# Patient Record
Sex: MALE | Race: WHITE | Employment: OTHER | URBAN - METROPOLITAN AREA
[De-identification: names, ages, dates, MRNs, and addresses within clinical notes are randomized per-mention and may not be internally consistent; named-entity substitution may affect disease eponyms.]

---

## 2017-01-23 ENCOUNTER — OFFICE VISIT (OUTPATIENT)
Dept: PEDIATRICS | Facility: CLINIC | Age: 77
End: 2017-01-23
Payer: MEDICARE

## 2017-01-23 ENCOUNTER — ANTICOAGULATION THERAPY VISIT (OUTPATIENT)
Dept: PHARMACY | Facility: CLINIC | Age: 77
End: 2017-01-23
Payer: MEDICARE

## 2017-01-23 VITALS
WEIGHT: 223 LBS | HEIGHT: 74 IN | HEART RATE: 77 BPM | TEMPERATURE: 97.5 F | SYSTOLIC BLOOD PRESSURE: 122 MMHG | BODY MASS INDEX: 28.62 KG/M2 | OXYGEN SATURATION: 98 % | DIASTOLIC BLOOD PRESSURE: 72 MMHG

## 2017-01-23 DIAGNOSIS — M54.16 LUMBAR RADICULOPATHY: Primary | ICD-10-CM

## 2017-01-23 DIAGNOSIS — I48.91 ATRIAL FIBRILLATION (H): Primary | ICD-10-CM

## 2017-01-23 DIAGNOSIS — Z79.01 LONG-TERM (CURRENT) USE OF ANTICOAGULANTS: ICD-10-CM

## 2017-01-23 LAB — INR POINT OF CARE: 3.2 (ref 0.86–1.14)

## 2017-01-23 PROCEDURE — 99213 OFFICE O/P EST LOW 20 MIN: CPT | Performed by: NURSE PRACTITIONER

## 2017-01-23 PROCEDURE — 85610 PROTHROMBIN TIME: CPT | Mod: QW

## 2017-01-23 PROCEDURE — 36416 COLLJ CAPILLARY BLOOD SPEC: CPT

## 2017-01-23 PROCEDURE — 99207 ZZC NO CHARGE NURSE ONLY: CPT

## 2017-01-23 RX ORDER — METHYLPREDNISOLONE 4 MG
TABLET, DOSE PACK ORAL
Qty: 21 TABLET | Refills: 0 | Status: SHIPPED | OUTPATIENT
Start: 2017-01-23 | End: 2018-03-21

## 2017-01-23 NOTE — MR AVS SNAPSHOT
Carlton Matosdylon   1/23/2017 9:00 AM   Anticoagulation Therapy Visit    Description:  76 year old male   Provider:  INR MG   Department:  Mg Med Monitoring           INR as of 1/23/2017     Selected INR 3.2! (1/23/2017)      Anticoagulation Summary as of 1/23/2017     INR goal 2.0-3.0   Selected INR 3.2! (1/23/2017)   Full instructions 4 mg every day   Next INR check 3/6/2017    Indications   Atrial fibrillation (HCC) [I48.91] [I48.91]  Long-term (current) use of anticoagulants [Z79.01] [Z79.01]         Your next Anticoagulation Clinic appointment(s)     Jan 23, 2017  9:00 AM   Anticoagulation Visit with INR MG   [facility] ([facility])    [id number] [place] [id number] [phone]            Mar 06, 2017  9:00 AM   Anticoagulation Visit with INR MG   [facility])    [id number] [place] [id number] [phone]              Contact Numbers     [facility]  Please call the anticoagulation nurse at [phone] to cancel and/or reschedule your appointment, or with any problems or questions regarding your therapy.  You may call the main clinic number at [phone] if the nurse is not available.           January 2017 Details    Sun Mon Tue Wed Thu Fri Sat     1               2               3               4               5               6               7                 8               9               10               11               12               13               14                 15               16               17               18               19               20               21                 22               23      4 mg   See details      24      4 mg         25      4 mg         26      4 mg         27      4 mg         28      4 mg           29      4 mg         30      4 mg         31      4 mg              Date Details   01/23 This INR check               How to take  your warfarin dose     To take:  4 mg Take 2 of the 2 mg tablets.           February 2017 Details    Sun Mon Tue Wed Thu Fri Sat        1      4 mg         2      4 mg         3      4 mg         4      4 mg           5      4 mg         6      4 mg         7      4 mg         8      4 mg         9      4 mg         10      4 mg         11      4 mg           12      4 mg         13      4 mg         14      4 mg         15      4 mg         16      4 mg         17      4 mg         18      4 mg           19      4 mg         20      4 mg         21      4 mg         22      4 mg         23      4 mg         24      4 mg         25      4 mg           26      4 mg         27      4 mg         28      4 mg              Date Details   No additional details            How to take your warfarin dose     To take:  4 mg Take 2 of the 2 mg tablets.           March 2017 Details    Sun Mon Tue Wed Thu Fri Sat        1      4 mg         2      4 mg         3      4 mg         4      4 mg           5      4 mg         6            7               8               9               10               11                 12               13               14               15               16               17               18                 19               20               21               22               23               24               25                 26               27               28               29               30               31                 Date Details   No additional details    Date of next INR:  3/6/2017         How to take your warfarin dose     To take:  4 mg Take 2 of the 2 mg tablets.

## 2017-01-23 NOTE — PROGRESS NOTES
ANTICOAGULATION FOLLOW-UP CLINIC VISIT    Patient Name:  Carlton Bravo  Date:  1/23/2017  Contact Type:  Face to Face    SUBJECTIVE:     Patient Findings     Positives No Problem Findings           OBJECTIVE    INR PROTIME   Date Value Ref Range Status   01/23/2017 3.2* 0.86 - 1.14 Final       ASSESSMENT / PLAN  INR assessment SUPRA    Recheck INR In: 6 WEEKS    INR Location Clinic      Anticoagulation Summary as of 1/23/2017     INR goal 2.0-3.0   Selected INR 3.2! 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

## 2017-01-23 NOTE — NURSING NOTE
"Chief Complaint   Patient presents with     Musculoskeletal Problem     right lower leg pain ongoing for the last 3 weeks       Initial /91 mmHg  Pulse 77  Temp(Src) 97.5  F (36.4  C) (Temporal)  Ht 6' 2\" (1.88 m)  Wt 223 lb (101.152 kg)  BMI 28.62 kg/m2  SpO2 98% Estimated body mass index is 28.62 kg/(m^2) as calculated from the following:    Height as of this encounter: 6' 2\" (1.88 m).    Weight as of this encounter: 223 lb (101.152 kg)..  BP completed using cuff size: ZOFIA Copeland    "

## 2017-01-23 NOTE — PROGRESS NOTES
SUBJECTIVE:                                                    Carlton Bravo is a 76 year old male who presents to clinic today for the following health issues:    Carlton reports 3 weeks of pain in his R buttock, R knee aching, and numbness to the entire R lower extremity, below the knee.  These symptoms come and go, are worse when he is sitting and improve with movement/stretching.  He has had problems with sciatica in the past, this feels similar. He has noted some slight swelling and constant stiffness to the R knee. He is not taking anything for it, has not tried heat/ice.  No change in activity level prior to onset of pain and no injury. Pain is not preventing him from completing his normal activities.  Joint Pain     Onset: 3 weeks     Description:   Location: lower right leg  Character: Dull ache    Intensity: mild, moderate    Progression of Symptoms: same    Accompanying Signs & Symptoms:  Other symptoms: numbness   History:   Previous similar pain: YES      Precipitating factors:   Trauma or overuse: no     Alleviating factors:  Improved by: nothing       Therapies Tried and outcome: none        Problem list and histories reviewed & adjusted, as indicated.  Additional history: as documented    Patient Active Problem List   Diagnosis     Hypertension     Idiopathic cardiomyopathy (H)     Dyslipidemia     Hemorrhage of gastrointestinal tract     Atrial fibrillation (HCC) [I48.91]     Long-term (current) use of anticoagulants [Z79.01]     Acute posthemorrhagic anemia     Alcohol abuse     Gastric ulcer with hemorrhage     Gastrointestinal hemorrhage     History of colonic polyps     Benign prostatic hyperplasia with urinary obstruction     Past Surgical History   Procedure Laterality Date     Knee surgery  1990     bilateral arthroscopic       Social History   Substance Use Topics     Smoking status: Never Smoker      Smokeless tobacco: Never Used     Alcohol Use: 0.0 oz/week     0 Standard drinks or  "equivalent per week      Comment: was drinking about 5 drinks a night but quit 7/2015 due to GI bleed.      Family History   Problem Relation Age of Onset     Myocardial Infarction       CEREBROVASCULAR DISEASE Father      Hypertension No family hx of      DIABETES No family hx of      Colon Cancer No family hx of      Prostate Cancer No family hx of          Current Outpatient Prescriptions   Medication Sig Dispense Refill     pantoprazole (PROTONIX) 40 MG enteric coated tablet Take 1 tablet (40 mg) by mouth 2 times daily Take 40 mg by mouth 2 times daily 180 tablet 1     warfarin (COUMADIN) 2 MG tablet Take 2 tablets (4 mg) by mouth daily X 5 days a week  3 tab. X 2 days a week or as directed by INR nurse 200 tablet 3     pravastatin (PRAVACHOL) 80 MG tablet Take 1 tablet (80 mg) by mouth daily 90 tablet 2     carvedilol (COREG) 6.25 MG tablet Take 2 tablets (12.5 mg) by mouth 2 times daily 360 tablet 3     No Known Allergies    ROS:  CONSTITUTIONAL:NEGATIVE for fever, chills, change in weight  INTEGUMENTARY/SKIN: NEGATIVE for worrisome rashes, redness,  or lesions  MUSCULOSKELETAL: POSITIVE  for joint stiffness in R knee, joint swelling R knee, radicular pain to R lower buttock and ache down lateral R leg. NEGATIVE for redness, warmth to joints.  NEURO: NEGATIVE for weakness, dizziness POSITIVE for numbness or tingling to RLE below the knee.  PSYCHIATRIC: NEGATIVE for changes in mood or affect, activity level    OBJECTIVE:                                                    /72 mmHg  Pulse 77  Temp(Src) 97.5  F (36.4  C) (Temporal)  Ht 6' 2\" (1.88 m)  Wt 223 lb (101.152 kg)  BMI 28.62 kg/m2  SpO2 98%  Body mass index is 28.62 kg/(m^2).     GENERAL: Patient is well nourished, well developed, well groomed and in no acute distress  NECK:normal, supple and no adenopathy  CARDIAC:regular rates and rhythm, normal S1 S2, no S3 or S4 and no murmur, click or rub.   RESP: clear to auscultation  unlabored " respirations  SKIN: negatives: no evidence of bleeding or bruising, no lesions, redness, or rash noted  MS: extremities normal- no gross deformities noted, normal muscle tone. Slight limp with first few steps, then steady, normal gait.  Generalized slight swelling to R knee. No pain with active or passive ROM of the knee. Negative valgus/varus stress, anterior/posterior drawer, lachmans.   No back pain with straight leg raise.    NEURO: Normal strength and tone, sensory exam grossly normal.  PSYCH: Alert, oriented, thought content appropriate,mentation appears normal., affect and mood normal, good hygiene    Diagnostic Test Results:  none      ASSESSMENT/PLAN:                                                      Carlton was seen today for musculoskeletal problem.    Diagnoses and all orders for this visit:    Lumbar radiculopathy  -     methylPREDNISolone (MEDROL DOSEPAK) 4 MG tablet; Follow package instructions      PLAN:   Symptomatic therapy suggested: OTC acetaminophen and call prn if symptoms persist or worsen  apply heat to affected area, apply ice to affected area  See Patient Instructions  I have examined the patient and agree with written evaluation. Assessment and plan presented by this provider.   WANDA Awan, JAMISON student at [employer]    NI Pearson CNP  [facility]

## 2017-01-23 NOTE — PATIENT INSTRUCTIONS
It was a pleasure seeing you today at the [facility]. Thank you for allowing us to care for you today. We truly hope we provided you with the excellent service you deserve. Please let us know if there is anything else we can do for you so we can be sure you are leaving completley satisfied with your care experience.       General information about your clinic   Clinic Hours Lab Hours (Appointments are required)   Mon-Thurs: 7:30 AM - 7 PM Mon-Thurs: 7:30 AM - 7 PM   Fri: 7:30 AM - 5 PM Fri: 7:30 AM - 5 PM        After Hours Nurse Advise & Appts:  [facility]: [phone]  [facility] On Call: to make appointments anytime: [phone] On Call Physician: call [phone] and answering service will page the on call physician.        For urgent appointments, please call [phone] and ask for the triage nurse or your care team clinic nurse.  How to contact my care team:  [place]: [email or web address]   Phone: [phone]   Fax: [phone]       [facility]:   Phone: [phone]  Hours: 8:00 AM - 6:00 PM  Medication Refills:  Call your pharmacy and they will forward the refill to us. Please allow 3 business days for your refills to be completed.       Normal or non-critical lab and imaging results will be communicated to you by MyChart, letter or phone within 7 days.  If you do not hear from us within 10 days, please call the clinic. If you have a critical or abnormal lab result, we will notify you by phone as soon as possible.       We now have PWIC (Pediatric Walk in Care)  Monday-Friday from 7:30-4. Simply walk in and be seen for your urgent needs like cough, fever, rash, diarrhea or vomiting, pink eye, UTI. No appointments needed. Ask one of the team for more information      -Your Care Team:    Dr. Walter Amaya - Internal Medicine/Pediatrics   Dr. Maurice Duggan - Family Medicine  Dr. Sheri Knight - Pediatrics  Lona Pettit CNP - Family Practice Nurse  Practitioner  Dr. Jenelle Lopez - Pediatrics  Dr. Maninder Rojas - Internal Medicine        PLAN:   1.   Symptomatic therapy suggested: OTC acetaminophen and call prn if symptoms persist or worsen  apply heat to affected area, apply ice to affected area    2.  Orders Placed This Encounter   Medications     methylPREDNISolone (MEDROL DOSEPAK) 4 MG tablet     Sig: Follow package instructions     Dispense:  21 tablet     Refill:  0     3. Patient needs to follow up in if no improvement,or sooner if worsening of symptoms or other symptoms develop.

## 2017-01-23 NOTE — MR AVS SNAPSHOT
After Visit Summary   1/23/2017    Carlton Bravo    MRN: [id number]           Patient Information     Date Of Birth          1940        Visit Information        Provider Department      1/23/2017 9:20 AM Lona Pettit APRN CNP [facility]        Today's Diagnoses     Lumbar radiculopathy    -  1       Care Instructions    It was a pleasure seeing you today at the [facility]. Thank you for allowing us to care for you today. We truly hope we provided you with the excellent service you deserve. Please let us know if there is anything else we can do for you so we can be sure you are leaving completley satisfied with your care experience.       General information about your clinic   Clinic Hours Lab Hours (Appointments are required)   Mon-Thurs: 7:30 AM - 7 PM Mon-Thurs: 7:30 AM - 7 PM   Fri: 7:30 AM - 5 PM Fri: 7:30 AM - 5 PM        After Hours Nurse Advise & Appts:  [facility] Nurse [facility]: [phone]  [facility] On Call: to make appointments anytime: [phone] On Call Physician: call [phone] and answering service will page the on call physician.        For urgent appointments, please call [phone] and ask for the triage nurse or your care team clinic nurse.  How to contact my care team:  [facility]: [email or web address]   Phone: [phone]   Fax: [phone]       [facility]:   Phone: [phone]  Hours: 8:00 AM - 6:00 PM  Medication Refills:  Call your pharmacy and they will forward the refill to us. Please allow 3 business days for your refills to be completed.       Normal or non-critical lab and imaging results will be communicated to you by MyChart, letter or phone within 7 days.  If you do not hear from us within 10 days, please call the clinic. If you have a critical or abnormal lab result, we will notify you by phone as soon as possible.       We now have PWIC (Pediatric Walk in Care)  Monday-Friday from  7:30-4. Simply walk in and be seen for your urgent needs like cough, fever, rash, diarrhea or vomiting, pink eye, UTI. No appointments needed. Ask one of the team for more information      -Your Care Team:    Dr. Walter Amaya - Internal Medicine/Pediatrics   Dr. Maurice Duggan - Family Medicine  Dr. Sheri Knight - Pediatrics  Lona Pettit CNP - Family Practice Nurse Practitioner  Dr. Jenelle Lopez - Pediatrics  Dr. Maninder Rojas - Internal Medicine        PLAN:   1.   Symptomatic therapy suggested: OTC acetaminophen and call prn if symptoms persist or worsen  apply heat to affected area, apply ice to affected area    2.  Orders Placed This Encounter   Medications     methylPREDNISolone (MEDROL DOSEPAK) 4 MG tablet     Sig: Follow package instructions     Dispense:  21 tablet     Refill:  0     3. Patient needs to follow up in if no improvement,or sooner if worsening of symptoms or other symptoms develop.                  Follow-ups after your visit        Your next 10 appointments already scheduled     Mar 06, 2017  9:00 AM   Anticoagulation Visit with INR MG   [facility] ([facility])    [address] [place] [address] [phone]              Who to contact     If you have questions or need follow up information about today's clinic visit or your schedule please contact [facility] directly at [phone].  Normal or non-critical lab and imaging results will be communicated to you by MyChart, letter or phone within 4 business days after the clinic has received the results. If you do not hear from us within 7 days, please contact the clinic through MyChart or phone. If you have a critical or abnormal lab result, we will notify you by phone as soon as possible.  Submit refill requests through [employer] or call your pharmacy and they will forward the refill request to us. Please allow 3 business days for your refill to be completed.           "Additional Information About Your Visit        [employer] Information     [employer] is an electronic gateway that provides easy, online access to your medical records. With [employer], you can request a clinic appointment, read your test results, renew a prescription or communicate with your care team.     To sign up for [employer] visit the website at [email or web address]   You will be asked to enter the access code listed below, as well as some personal information. Please follow the directions to create your username and password.     Your access code is: [email or web address]  Expires: 2017 10:02 AM     Your access code will  in 90 days. If you need help or a new code, please contact your [facility] or call [phone] for assistance.        Care EveryWhere ID     This is your Care EveryWhere ID. This could be used by other organizations to access your [facility] medical records  [id number]        Your Vitals Were     Pulse Temperature Height BMI (Body Mass Index) Pulse Oximetry       77 97.5  F (36.4  C) (Temporal) 6' 2\" (1.88 m) 28.62 kg/m2 98%        Blood Pressure from Last 3 Encounters:   17 155/91   16 125/60   10/20/15 110/80    Weight from Last 3 Encounters:   17 223 lb (101.152 kg)   16 224 lb 14.4 oz (102.014 kg)   10/20/15 206 lb 12.8 oz (93.804 kg)              Today, you had the following     No orders found for display         Today's Medication Changes          These changes are accurate as of: 17 10:02 AM.  If you have any questions, ask your nurse or doctor.               Start taking these medicines.        Dose/Directions    methylPREDNISolone 4 MG tablet   Commonly known as:  MEDROL DOSEPAK   Used for:  Lumbar radiculopathy   Started by:  Lona Pettit APRN CNP        Follow package instructions   Quantity:  21 tablet   Refills:  0            Where to get your medicines      These medications were sent to [id number] " - [place], [place] - [address]  [address], [facility] [address]     Phone:  [phone]    - methylPREDNISolone 4 MG tablet             Primary Care Provider Office Phone # Fax #    Lona PettitNI [phone]       [facility] [address]  [place] [address]        Thank you!     Thank you for choosing [facility]  for your care. Our goal is always to provide you with excellent care. Hearing back from our patients is one way we can continue to improve our services. Please take a few minutes to complete the written survey that you may receive in the mail after your visit with us. Thank you!             Your Updated Medication List - Protect others around you: Learn how to safely use, store and throw away your medicines at www.disposemymeds.org.          This list is accurate as of: 1/23/17 10:02 AM.  Always use your most recent med list.                   Brand Name Dispense Instructions for use    carvedilol 6.25 MG tablet    COREG    360 tablet    Take 2 tablets (12.5 mg) by mouth 2 times daily       methylPREDNISolone 4 MG tablet    MEDROL DOSEPAK    21 tablet    Follow package instructions       pantoprazole 40 MG EC tablet    PROTONIX    180 tablet    Take 1 tablet (40 mg) by mouth 2 times daily Take 40 mg by mouth 2 times daily       pravastatin 80 MG tablet    PRAVACHOL    90 tablet    Take 1 tablet (80 mg) by mouth daily       warfarin 2 MG tablet    COUMADIN    200 tablet    Take 2 tablets (4 mg) by mouth daily X 5 days a week  3 tab. X 2 days a week or as directed by INR nurse

## 2017-02-03 DIAGNOSIS — E78.5 DYSLIPIDEMIA: ICD-10-CM

## 2017-02-03 DIAGNOSIS — I10 HTN, GOAL BELOW 140/90: ICD-10-CM

## 2017-02-03 DIAGNOSIS — K25.0: Primary | ICD-10-CM

## 2017-02-08 RX ORDER — PANTOPRAZOLE SODIUM 40 MG/1
TABLET, DELAYED RELEASE ORAL
Qty: 180 TABLET | Refills: 1 | Status: SHIPPED | OUTPATIENT
Start: 2017-02-08 | End: 2017-12-24

## 2017-02-08 RX ORDER — PRAVASTATIN SODIUM 80 MG/1
TABLET ORAL
Qty: 90 TABLET | Refills: 2 | Status: SHIPPED | OUTPATIENT
Start: 2017-02-08 | End: 2017-10-12

## 2017-02-08 NOTE — TELEPHONE ENCOUNTER
pravastatin (PRAVACHOL) 80 MG tablet  Last Written Prescription Date: 5/6/16  Last Fill Quantity: 90, # refills: 2    Last Office Visit with [facility], [facility] or [facility] prescribing provider:  1/23/17   Future Office Visit:  None for medication recheck    pantoprazole (PROTONIX) 40 MG enteric coated tablet      Last Written Prescription Date: 8/2/16  Last Fill Quantity: 180, # refills: 1    Last Office Visit with [facility], [facility] or [facility] prescribing provider:  1/23/17       CHOLESTEROL   Date Value Ref Range Status   10/20/2015 179 <200 mg/dL Final     Comment:     LDL Cholesterol is the primary guide to therapy.   The NCEP recommends further evaluation of: patients with cholesterol greater   than 200 mg/dL if additional risk factors are present, cholesterol greater   than   240 mg/dL, triglycerides greater than 150 mg/dL, or HDL less than 40 mg/dL.       HDL CHOLESTEROL   Date Value Ref Range Status   10/20/2015 56 >40 mg/dL Final     LDL CHOLESTEROL CALCULATED   Date Value Ref Range Status   10/20/2015 103 0 - 129 mg/dL Final     Comment:     LDL Cholesterol is the primary guide to therapy: LDL-cholesterol goal in high   risk patients is <100 mg/dL and in very high risk patients is <70 mg/dL.       TRIGLYCERIDES   Date Value Ref Range Status   10/20/2015 99 0 - 150 mg/dL Final     Comment:     Fasting specimen     CHOLESTEROL/HDL RATIO   Date Value Ref Range Status   10/20/2015 3.2 0.0 - 5.0 Final     ALT   Date Value Ref Range Status   07/22/2016 32 0 - 70 U/L Final

## 2017-02-10 NOTE — TELEPHONE ENCOUNTER
Attempt #1:  Left message for patient stating prescriptions has been sent to the pharmacy. Advised patient to call clinic to schedule fasting lab appointment. Clinic number given.  Ruchi Plata CMA

## 2017-02-16 NOTE — TELEPHONE ENCOUNTER
Notified patient that prescriptions have been sent to the pharmacy. Patient stated understanding and scheduled fasting lab appointment on 03/06/17 at 9:20 AM.  Ruchi Plata CMA

## 2017-02-22 DIAGNOSIS — I48.91 ATRIAL FIBRILLATION (H): ICD-10-CM

## 2017-02-23 RX ORDER — WARFARIN SODIUM 2 MG/1
TABLET ORAL
Qty: 200 TABLET | Refills: 3 | OUTPATIENT
Start: 2017-02-23

## 2017-02-23 NOTE — TELEPHONE ENCOUNTER
Called the pharmacy, patient just picked up 200 tabs 1 week ago.    Rx too soon to fill.    Nisha Blanco RN, [facility]

## 2017-03-03 DIAGNOSIS — I48.91 ATRIAL FIBRILLATION, UNSPECIFIED TYPE (H): Primary | ICD-10-CM

## 2017-03-03 DIAGNOSIS — I10 HTN, GOAL BELOW 140/90: ICD-10-CM

## 2017-03-03 DIAGNOSIS — I42.9 IDIOPATHIC CARDIOMYOPATHY (H): ICD-10-CM

## 2017-03-03 DIAGNOSIS — Z79.01 LONG-TERM (CURRENT) USE OF ANTICOAGULANTS: ICD-10-CM

## 2017-03-03 RX ORDER — CARVEDILOL 6.25 MG/1
12.5 TABLET ORAL 2 TIMES DAILY
Qty: 360 TABLET | Refills: 0 | Status: SHIPPED | OUTPATIENT
Start: 2017-03-03 | End: 2017-06-02

## 2017-03-03 NOTE — TELEPHONE ENCOUNTER
He should be followed periodically with his history   It looks like he saw Dr. Shah a few years ago and looks like he wanted to see him back in a year.   I have make him a referral as he can see a cardiologist here if that is more convenient

## 2017-03-03 NOTE — TELEPHONE ENCOUNTER
Pt called about refill. I passed along the information but pt would like to know why he has to see a cardiologist because he has been taking the medication for over 10 years.

## 2017-03-03 NOTE — TELEPHONE ENCOUNTER
Patient is requesting this medication.  He has not gotten this from a Primary Care Provider before, had been handled by Dr. Bentley.  He states he does not see cardiology anymore.      He would like a call back with outcome.    Routing to Dania Pettit NP to please advise on refill.    Carvedilol      Last Written Prescription Date: 2/26/16  Last Fill Quantity: 360, # refills: 3  Last Office Visit with [facility] or [facility] prescribing provider: 1/23/17       Potassium   Date Value Ref Range Status   07/22/2016 4.2 3.4 - 5.3 mmol/L Final     Creatinine   Date Value Ref Range Status   07/22/2016 0.96 0.66 - 1.25 mg/dL Final     BP Readings from Last 3 Encounters:   01/23/17 122/72   07/22/16 125/60   10/20/15 110/80     Nisha Blanco RN, [facility]

## 2017-03-03 NOTE — TELEPHONE ENCOUNTER
Pharmacy contacted clinic twice.  Patient calling now, wants med filled,  See meds and orders for RX and Pharmacy.   [id number] [address]  Patient:  [phone] (home)   carvedilol (COREG) 6.25 MG tablet

## 2017-03-03 NOTE — TELEPHONE ENCOUNTER
Will do one refill but needs to make a follow up appointment with cardiology or can make him referral do new cardiologist who he can see at the [facility]

## 2017-03-06 ENCOUNTER — ANTICOAGULATION THERAPY VISIT (OUTPATIENT)
Dept: PHARMACY | Facility: CLINIC | Age: 77
End: 2017-03-06
Payer: MEDICARE

## 2017-03-06 DIAGNOSIS — Z79.01 LONG-TERM (CURRENT) USE OF ANTICOAGULANTS: ICD-10-CM

## 2017-03-06 DIAGNOSIS — I10 HTN, GOAL BELOW 140/90: ICD-10-CM

## 2017-03-06 DIAGNOSIS — I48.91 ATRIAL FIBRILLATION, UNSPECIFIED TYPE (H): ICD-10-CM

## 2017-03-06 DIAGNOSIS — E78.5 DYSLIPIDEMIA: ICD-10-CM

## 2017-03-06 LAB
ALBUMIN SERPL-MCNC: 3.6 G/DL (ref 3.4–5)
ALP SERPL-CCNC: 72 U/L (ref 40–150)
ALT SERPL W P-5'-P-CCNC: 27 U/L (ref 0–70)
ANION GAP SERPL CALCULATED.3IONS-SCNC: 10 MMOL/L (ref 3–14)
AST SERPL W P-5'-P-CCNC: 22 U/L (ref 0–45)
BILIRUB SERPL-MCNC: 1 MG/DL (ref 0.2–1.3)
BUN SERPL-MCNC: 14 MG/DL (ref 7–30)
CALCIUM SERPL-MCNC: 8.8 MG/DL (ref 8.5–10.1)
CHLORIDE SERPL-SCNC: 107 MMOL/L (ref 94–109)
CHOLEST SERPL-MCNC: 198 MG/DL
CO2 SERPL-SCNC: 27 MMOL/L (ref 20–32)
CREAT SERPL-MCNC: 0.92 MG/DL (ref 0.66–1.25)
CREAT UR-MCNC: 232 MG/DL
GFR SERPL CREATININE-BSD FRML MDRD: 80 ML/MIN/1.7M2
GLUCOSE SERPL-MCNC: 100 MG/DL (ref 70–99)
HDLC SERPL-MCNC: 104 MG/DL
INR POINT OF CARE: 3.1 (ref 0.86–1.14)
LDLC SERPL CALC-MCNC: 79 MG/DL
MICROALBUMIN UR-MCNC: 67 MG/L
MICROALBUMIN/CREAT UR: 29.01 MG/G CR (ref 0–17)
NONHDLC SERPL-MCNC: 94 MG/DL
POTASSIUM SERPL-SCNC: 4 MMOL/L (ref 3.4–5.3)
PROT SERPL-MCNC: 7.1 G/DL (ref 6.8–8.8)
SODIUM SERPL-SCNC: 144 MMOL/L (ref 133–144)
TRIGL SERPL-MCNC: 75 MG/DL

## 2017-03-06 PROCEDURE — 85610 PROTHROMBIN TIME: CPT | Mod: QW

## 2017-03-06 PROCEDURE — 80061 LIPID PANEL: CPT | Performed by: NURSE PRACTITIONER

## 2017-03-06 PROCEDURE — 80053 COMPREHEN METABOLIC PANEL: CPT | Performed by: NURSE PRACTITIONER

## 2017-03-06 PROCEDURE — 36416 COLLJ CAPILLARY BLOOD SPEC: CPT

## 2017-03-06 PROCEDURE — 82043 UR ALBUMIN QUANTITATIVE: CPT | Performed by: NURSE PRACTITIONER

## 2017-03-06 PROCEDURE — 99207 ZZC NO CHARGE NURSE ONLY: CPT

## 2017-03-06 NOTE — MR AVS SNAPSHOT
Carlton Matosdylon   3/6/2017 9:00 AM   Anticoagulation Therapy Visit    Description:  76 year old male   Provider:  INR MG   Department:  Mg Med Monitoring           INR as of 3/6/2017     Today's INR 3.1!      Anticoagulation Summary as of 3/6/2017     INR goal 2.0-3.0   Today's INR 3.1!   Full instructions 4 mg every day   Next INR check 4/17/2017    Indications   Atrial fibrillation (HCC) [I48.91] [I48.91]  Long-term (current) use of anticoagulants [Z79.01] [Z79.01]         Your next Anticoagulation Clinic appointment(s)     Apr 17, 2017  9:00 AM CDT   Anticoagulation Visit with INR MG   [facility]    [address] [place] [address] [phone]              Contact Numbers     [facility]  Please call the anticoagulation nurse at [phone] to cancel and/or reschedule your appointment, or with any problems or questions regarding your therapy.  You may call the main clinic number at [phone] if the nurse is not available.           March 2017 Details    Sun Mon Tue Wed Thu Fri Sat        1               2               3               4                 5               6      4 mg   See details      7      4 mg         8      4 mg         9      4 mg         10      4 mg         11      4 mg           12      4 mg         13      4 mg         14      4 mg         15      4 mg         16      4 mg         17      4 mg         18      4 mg           19      4 mg         20      4 mg         21      4 mg         22      4 mg         23      4 mg         24      4 mg         25      4 mg           26      4 mg         27      4 mg         28      4 mg         29      4 mg         30      4 mg         31      4 mg           Date Details   03/06 This INR check               How to take your warfarin dose     To take:  4 mg Take 2 of the 2 mg tablets.           April 2017 Details    Sun Mon Tue Wed Thu Fri Sat           1      4 mg            2      4 mg         3      4 mg         4      4 mg         5      4 mg         6      4 mg         7      4 mg         8      4 mg           9      4 mg         10      4 mg         11      4 mg         12      4 mg         13      4 mg         14      4 mg         15      4 mg           16      4 mg         17            18               19               20               21               22                 23               24               25               26               27               28               29                 30                      Date Details   No additional details    Date of next INR:  4/17/2017         How to take your warfarin dose     To take:  4 mg Take 2 of the 2 mg tablets.

## 2017-03-06 NOTE — PROGRESS NOTES
ANTICOAGULATION FOLLOW-UP CLINIC VISIT    Patient Name:  Carlton Bravo  Date:  3/6/2017  Contact Type:  Face to Face    SUBJECTIVE:        OBJECTIVE    INR Protime   Date Value Ref Range Status   03/06/2017 3.1 (A) 0.86 - 1.14 Final       ASSESSMENT / PLAN  INR assessment THER    Recheck INR In: 6 WEEKS    INR Location Clinic      Anticoagulation Summary as of 3/6/2017     INR goal 2.0-3.0   Today's INR 3.1!   Maintenance plan 4 mg (2 mg x 2) every day   Full instructions 4 mg every day   Weekly total 28 mg   No change documented Nisha Blanco RN   Plan last modified Nisha Blanco RN (1/23/2017)   Next INR check 4/17/2017   Priority INR   Target end date     Indications   Atrial fibrillation (HCC) [I48.91] [I48.91]  Long-term (current) use of anticoagulants [Z79.01] [Z79.01]         Anticoagulation Episode Summary     INR check location     Preferred lab     Send INR reminders to MG  INR    Comments possible chromogenic 10/antiphosholipid syndrome            See the Encounter Report to view Anticoagulation Flowsheet and Dosing Calendar (Go to Encounters tab in chart review, and find the Anticoagulation Therapy Visit)        Nisha Blanco RN

## 2017-03-06 NOTE — TELEPHONE ENCOUNTER
"Called and informed patient of message. Patient noted \"it will be my choice\" did informed patient referral has been placed.    ZOFIA Ortiz    "

## 2017-04-17 ENCOUNTER — ANTICOAGULATION THERAPY VISIT (OUTPATIENT)
Dept: PHARMACY | Facility: CLINIC | Age: 77
End: 2017-04-17
Payer: MEDICARE

## 2017-04-17 DIAGNOSIS — I48.91 ATRIAL FIBRILLATION, UNSPECIFIED TYPE (H): ICD-10-CM

## 2017-04-17 DIAGNOSIS — Z79.01 LONG-TERM (CURRENT) USE OF ANTICOAGULANTS: ICD-10-CM

## 2017-04-17 LAB — INR POINT OF CARE: 2.6 (ref 0.86–1.14)

## 2017-04-17 PROCEDURE — 36416 COLLJ CAPILLARY BLOOD SPEC: CPT

## 2017-04-17 PROCEDURE — 99207 ZZC NO CHARGE NURSE ONLY: CPT

## 2017-04-17 PROCEDURE — 85610 PROTHROMBIN TIME: CPT | Mod: QW

## 2017-04-17 NOTE — PROGRESS NOTES
ANTICOAGULATION FOLLOW-UP CLINIC VISIT    Patient Name:  Carlton Bravo  Date:  4/17/2017  Contact Type:  Face to Face    SUBJECTIVE:     Patient Findings     Positives Change in medications           OBJECTIVE    INR Protime   Date Value Ref Range Status   04/17/2017 2.6 (A) 0.86 - 1.14 Final       ASSESSMENT / PLAN  INR assessment THER    Recheck INR In: 6 WEEKS    INR Location Clinic      Anticoagulation Summary as of 4/17/2017     INR goal 2.0-3.0   Today's INR 2.6   Maintenance plan 4 mg (2 mg x 2) every day   Full instructions 4 mg every day   Weekly total 28 mg   No change documented Annabelle Almaguer, RN   Plan last modified Nisha Blanco RN (1/23/2017)   Next INR check 5/30/2017   Priority INR   Target end date     Indications   Atrial fibrillation (HCC) [I48.91] [I48.91]  Long-term (current) use of anticoagulants [Z79.01] [Z79.01]         Anticoagulation Episode Summary     INR check location     Preferred lab     Send INR reminders to MG  INR    Comments possible chromogenic 10/antiphosholipid syndrome            See the Encounter Report to view Anticoagulation Flowsheet and Dosing Calendar (Go to Encounters tab in chart review, and find the Anticoagulation Therapy Visit)        Annabelle Almaguer, KACI

## 2017-04-17 NOTE — MR AVS SNAPSHOT
Carlton BAIRES Shelly   4/17/2017 9:00 AM   Anticoagulation Therapy Visit    Description:  76 year old male   Provider:  INR MG   Department:  Mg Med Monitoring           INR as of 4/17/2017     Today's INR 2.6      Anticoagulation Summary as of 4/17/2017     INR goal 2.0-3.0   Today's INR 2.6   Full instructions 4 mg every day   Next INR check 5/30/2017    Indications   Atrial fibrillation (HCC) [I48.91] [I48.91]  Long-term (current) use of anticoagulants [Z79.01] [Z79.01]         Your next Anticoagulation Clinic appointment(s)     May 30, 2017 12:40 PM CDT   Anticoagulation Visit with INR MG   [facility])    [address] [place] [address] [phone]              Contact Numbers     [facility]  Please call the anticoagulation nurse at [phone] to cancel and/or reschedule your appointment, or with any problems or questions regarding your therapy.  You may call the main clinic number at [phone] if the nurse is not available.           April 2017 Details    Sun Mon Tue Wed Thu Fri Sat           1                 2               3               4               5               6               7               8                 9               10               11               12               13               14               15                 16               17      4 mg   See details      18      4 mg         19      4 mg         20      4 mg         21      4 mg         22      4 mg           23      4 mg         24      4 mg         25      4 mg         26      4 mg         27      4 mg         28      4 mg         29      4 mg           30      4 mg                Date Details   04/17 This INR check               How to take your warfarin dose     To take:  4 mg Take 2 of the 2 mg tablets.           May 2017 Details    Sun Mon Tue Wed Thu Fri Sat      1      4 mg         2      4 mg         3      4 mg         4      4 mg         5       4 mg         6      4 mg           7      4 mg         8      4 mg         9      4 mg         10      4 mg         11      4 mg         12      4 mg         13      4 mg           14      4 mg         15      4 mg         16      4 mg         17      4 mg         18      4 mg         19      4 mg         20      4 mg           21      4 mg         22      4 mg         23      4 mg         24      4 mg         25      4 mg         26      4 mg         27      4 mg           28      4 mg         29      4 mg         30            31                   Date Details   No additional details    Date of next INR:  5/30/2017         How to take your warfarin dose     To take:  4 mg Take 2 of the 2 mg tablets.

## 2017-05-18 DIAGNOSIS — I10 HTN, GOAL BELOW 140/90: ICD-10-CM

## 2017-05-18 DIAGNOSIS — I42.9 IDIOPATHIC CARDIOMYOPATHY (H): ICD-10-CM

## 2017-05-18 NOTE — TELEPHONE ENCOUNTER
Last Visit:06/23/14  Next Visit: 07/19/17  Assessment and recommendations:     1) Hypertension - He has had problems with BP control in the past. He reports much better readings at home. I have asked him to check his BP at home 2-3x/week and call if it is > 135/85.     2) Idiopathic cardiomyopathy - on therapy his LV function returned to normal. He reports no symptoms of heart failure. A f/u echocardiogram has been ordered.     3) Atrial fibrillation - he was not aware he was back in atrial fibrillation. He reports no symptoms. He has not noted any change in his functional capacity. He does not have palpitations. We discussed that in the absence of symptoms there is not a strong case to be made for cardioversion.     His atrial size on the echocardiogram may provide an indication of whether the atrial fibrillation has been long-standing.     I appreciate the chance to help with Mr. Bravo's care. Please let me know if you have any questions or concerns.

## 2017-05-19 RX ORDER — CARVEDILOL 6.25 MG/1
12.5 TABLET ORAL 2 TIMES DAILY
Qty: 360 TABLET | Refills: 0 | OUTPATIENT
Start: 2017-05-19

## 2017-05-21 DIAGNOSIS — I48.91 ATRIAL FIBRILLATION (H): ICD-10-CM

## 2017-05-22 RX ORDER — WARFARIN SODIUM 2 MG/1
TABLET ORAL
Qty: 180 TABLET | Refills: 0 | Status: SHIPPED | OUTPATIENT
Start: 2017-05-22 | End: 2017-08-31

## 2017-05-22 NOTE — TELEPHONE ENCOUNTER
warfarin (COUMADIN) 2 MG tablet  Last Written Prescription Date: 5/18/2016  Last Fill Qty: 200, # refills: 3  Last Office Visit with [facility], [facility] or [facility] prescribing provider: 1/23/2017  Next 5 appointments (look out 90 days)     Jul 19, 2017  8:30 AM CDT   Return Visit with Dakota Bentley MD   [facility])    [address] [place] [address] [phone]                   Date and Result of Last PT/INR:   Lab Results   Component Value Date    INR 2.6 04/17/2017    INR 3.1 03/06/2017    INR 1.08 07/22/2016    INR 1.08 07/22/2016        Refilled per [facility] protocol.    Annabelle Almaguer RN

## 2017-05-30 ENCOUNTER — ANTICOAGULATION THERAPY VISIT (OUTPATIENT)
Dept: PHARMACY | Facility: CLINIC | Age: 77
End: 2017-05-30
Payer: MEDICARE

## 2017-05-30 DIAGNOSIS — I48.91 ATRIAL FIBRILLATION, UNSPECIFIED TYPE (H): ICD-10-CM

## 2017-05-30 DIAGNOSIS — Z79.01 LONG-TERM (CURRENT) USE OF ANTICOAGULANTS: ICD-10-CM

## 2017-05-30 LAB — INR POINT OF CARE: 2.6 (ref 0.86–1.14)

## 2017-05-30 PROCEDURE — 36416 COLLJ CAPILLARY BLOOD SPEC: CPT

## 2017-05-30 PROCEDURE — 85610 PROTHROMBIN TIME: CPT | Mod: QW

## 2017-05-30 PROCEDURE — 99207 ZZC NO CHARGE NURSE ONLY: CPT

## 2017-05-30 NOTE — PROGRESS NOTES
ANTICOAGULATION FOLLOW-UP CLINIC VISIT    Patient Name:  Carlton Bravo  Date:  5/30/2017  Contact Type:  Face to Face    SUBJECTIVE:     Patient Findings     Positives No Problem Findings           OBJECTIVE    INR Protime   Date Value Ref Range Status   05/30/2017 2.6 (A) 0.86 - 1.14 Final       ASSESSMENT / PLAN  No question data found.  Anticoagulation Summary as of 5/30/2017     INR goal 2.0-3.0   Today's INR 2.6   Maintenance plan 4 mg (2 mg x 2) every day   Full instructions 4 mg every day   Weekly total 28 mg   No change documented Annabelle Almaguer, KACI   Plan last modified Nisha Blanco RN (1/23/2017)   Next INR check    Priority INR   Target end date     Indications   Atrial fibrillation (HCC) [I48.91] [I48.91]  Long-term (current) use of anticoagulants [Z79.01] [Z79.01]         Anticoagulation Episode Summary     INR check location     Preferred lab     Send INR reminders to MG  INR    Comments possible chromogenic 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

## 2017-05-30 NOTE — MR AVS SNAPSHOT
Carlton BAIRES Shelly   5/30/2017 12:40 PM   Anticoagulation Therapy Visit    Description:  76 year old male   Provider:  INR MG   Department:  Mg Med Monitoring           INR as of 5/30/2017     Today's INR 2.6      Anticoagulation Summary as of 5/30/2017     INR goal 2.0-3.0   Today's INR 2.6   Full instructions 4 mg every day   Next INR check 7/11/2017    Indications   Atrial fibrillation (HCC) [I48.91] [I48.91]  Long-term (current) use of anticoagulants [Z79.01] [Z79.01]         Your next Anticoagulation Clinic appointment(s)     May 30, 2017 12:40 PM CDT   Anticoagulation Visit with INR MG   [facility])    [address] [place] [address] [phone]            Jul 11, 2017  9:00 AM CDT   Anticoagulation Visit with INR MG   [facility])    [address] [place] [address] [phone]              Contact Numbers     [facility]  Please call the anticoagulation nurse at [phone] to cancel and/or reschedule your appointment, or with any problems or questions regarding your therapy.  You may call the main clinic number at [phone] if the nurse is not available.           May 2017 Details    Sun Mon Tue Wed Thu Fri Sat      1               2               3               4               5               6                 7               8               9               10               11               12               13                 14               15               16               17               18               19               20                 21               22               23               24               25               26               27                 28               29               30      4 mg   See details      31      4 mg             Date Details   05/30 This INR check               How to take your warfarin dose     To take:  4 mg Take 2 of the  2 mg tablets.           June 2017 Details    Sun Mon Tue Wed Thu Fri Sat         1      4 mg         2      4 mg         3      4 mg           4      4 mg         5      4 mg         6      4 mg         7      4 mg         8      4 mg         9      4 mg         10      4 mg           11      4 mg         12      4 mg         13      4 mg         14      4 mg         15      4 mg         16      4 mg         17      4 mg           18      4 mg         19      4 mg         20      4 mg         21      4 mg         22      4 mg         23      4 mg         24      4 mg           25      4 mg         26      4 mg         27      4 mg         28      4 mg         29      4 mg         30      4 mg           Date Details   No additional details            How to take your warfarin dose     To take:  4 mg Take 2 of the 2 mg tablets.           July 2017 Details    Sun Mon Tue Wed Thu Fri Sat           1      4 mg           2      4 mg         3      4 mg         4      4 mg         5      4 mg         6      4 mg         7      4 mg         8      4 mg           9      4 mg         10      4 mg         11            12               13               14               15                 16               17               18               19               20               21               22                 23               24               25               26               27               28               29                 30               31                     Date Details   No additional details    Date of next INR:  7/11/2017         How to take your warfarin dose     To take:  4 mg Take 2 of the 2 mg tablets.

## 2017-06-02 DIAGNOSIS — I42.9 IDIOPATHIC CARDIOMYOPATHY (H): ICD-10-CM

## 2017-06-02 DIAGNOSIS — I10 HTN, GOAL BELOW 140/90: ICD-10-CM

## 2017-06-02 NOTE — TELEPHONE ENCOUNTER
Chart reviewed.   LOV 6/23/14 with Dr. Bentley.       carvedilol (COREG) 6.25 MG tablet      Last Written Prescription Date: 3/3/17  Last Fill Quantity: 360, # refills: 0    Last Office Visit with [facility], [facility] or [facility] prescribing provider:  PCP on 1/23/17   Future Office Visit:    Next 5 appointments (look out 90 days)     Aug 02, 2017  9:30 AM CDT   Return Visit with Dakota Bentley MD   [facility]    [address] [place] [id number]   [phone]                    BP Readings from Last 3 Encounters:   01/23/17 122/72   07/22/16 125/60   10/20/15 110/80       Routed to primary care provider for review and approval of medication request.    Nisha Lemus, MADHUN, RN, PHN  Rheumatology Care Coordinator  [facility]

## 2017-06-02 NOTE — TELEPHONE ENCOUNTER
Patient had an appointment with Dr. Bentley on 7/19/17.  This was rescheduled to 8/2/17 due to the provider attending schedule.  Patient states he will need a refill of his medication prior to this appointment.  Dania Pettit filled last, but he stated that she prefers that he get this from Dr. Bentley.      Carol Westholter

## 2017-06-05 RX ORDER — CARVEDILOL 6.25 MG/1
12.5 TABLET ORAL 2 TIMES DAILY
Qty: 360 TABLET | Refills: 0 | Status: SHIPPED | OUTPATIENT
Start: 2017-06-05 | End: 2017-08-02

## 2017-06-05 NOTE — TELEPHONE ENCOUNTER
Routed to Dania Pettit to review and authorize.    Betty De Guzman RN,   [facility]

## 2017-06-06 NOTE — TELEPHONE ENCOUNTER
Attempt #1:    Left message with patient to give call back to clinic. Clinic number given.    Sabine Covington, HELIO

## 2017-06-07 NOTE — TELEPHONE ENCOUNTER
Patient called back and Babita Sandoval told him we refilled his carvedilol medication and that he needs to keep his appt with cardiology per Dania Pettit.    Lucrecia Oliver CMA

## 2017-07-11 ENCOUNTER — ANTICOAGULATION THERAPY VISIT (OUTPATIENT)
Dept: PHARMACY | Facility: CLINIC | Age: 77
End: 2017-07-11
Payer: MEDICARE

## 2017-07-11 ENCOUNTER — TELEPHONE (OUTPATIENT)
Dept: PHARMACY | Facility: CLINIC | Age: 77
End: 2017-07-11

## 2017-07-11 DIAGNOSIS — I48.91 ATRIAL FIBRILLATION, UNSPECIFIED TYPE (H): ICD-10-CM

## 2017-07-11 DIAGNOSIS — Z79.01 LONG-TERM (CURRENT) USE OF ANTICOAGULANTS: ICD-10-CM

## 2017-07-11 DIAGNOSIS — I48.91 ATRIAL FIBRILLATION, UNSPECIFIED TYPE (H): Primary | ICD-10-CM

## 2017-07-11 LAB — INR POINT OF CARE: 3.5 (ref 0.86–1.14)

## 2017-07-11 PROCEDURE — 99207 ZZC NO CHARGE NURSE ONLY: CPT

## 2017-07-11 PROCEDURE — 36416 COLLJ CAPILLARY BLOOD SPEC: CPT

## 2017-07-11 PROCEDURE — 85610 PROTHROMBIN TIME: CPT | Mod: QW

## 2017-07-11 NOTE — PROGRESS NOTES
ANTICOAGULATION FOLLOW-UP CLINIC VISIT    Patient Name:  Carlton Bravo  Date:  7/11/2017  Contact Type:  Face to Face    SUBJECTIVE:     Patient Findings     Positives Change in diet/appetite    Comments Patient reports a lack of dark leafy greens in his diet. Patient requests that he incorporate his normal vitamin K foods back into his diet and return in 1 week rather than making a dose adjustment.            OBJECTIVE    INR Protime   Date Value Ref Range Status   07/11/2017 3.5 (A) 0.86 - 1.14 Final       ASSESSMENT / PLAN  INR assessment SUPRA    Recheck INR In: 1 WEEK    INR Location Clinic      Anticoagulation Summary as of 7/11/2017     INR goal 2.0-3.0   Today's INR 3.5!   Maintenance plan 4 mg (2 mg x 2) every day   Full instructions 4 mg every day   Weekly total 28 mg   No change documented Annabelle Almaguer RN   Plan last modified Nisha Blanco RN (1/23/2017)   Next INR check 7/18/2017   Priority INR   Target end date     Indications   Atrial fibrillation (HCC) [I48.91] [I48.91]  Long-term (current) use of anticoagulants [Z79.01] [Z79.01]         Anticoagulation Episode Summary     INR check location     Preferred lab     Send INR reminders to MG  INR    Comments possible chromogenic 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

## 2017-07-11 NOTE — MR AVS SNAPSHOT
Carlton Matosdylon   7/11/2017 9:00 AM   Anticoagulation Therapy Visit    Description:  77 year old male   Provider:  INR MG   Department:  Mg Med Monitoring           INR as of 7/11/2017     Today's INR 3.5!      Anticoagulation Summary as of 7/11/2017     INR goal 2.0-3.0   Today's INR 3.5!   Full instructions 4 mg every day   Next INR check 7/18/2017    Indications   Atrial fibrillation (HCC) [I48.91] [I48.91]  Long-term (current) use of anticoagulants [Z79.01] [Z79.01]         Your next Anticoagulation Clinic appointment(s)     Jul 11, 2017  9:00 AM CDT   Anticoagulation Visit with INR MG   [facility])    [address] [place] [address] [phone]            Jul 18, 2017  9:20 AM CDT   Anticoagulation Visit with INR MG   [facility])    [address] [place] [address] [phone]              Contact Numbers     [facility]  Please call the anticoagulation nurse at [phone] to cancel and/or reschedule your appointment, or with any problems or questions regarding your therapy.  You may call the main clinic number at [phone] if the nurse is not available.           July 2017 Details    Sun Mon Tue Wed Thu Fri Sat           1                 2               3               4               5               6               7               8                 9               10               11      4 mg   See details      12      4 mg         13      4 mg         14      4 mg         15      4 mg           16      4 mg         17      4 mg         18            19               20               21               22                 23               24               25               26               27               28               29                 30               31                     Date Details   07/11 This INR check       Date of next INR:  7/18/2017         How to  take your warfarin dose     To take:  4 mg Take 2 of the 2 mg tablets.

## 2017-07-18 ENCOUNTER — ANTICOAGULATION THERAPY VISIT (OUTPATIENT)
Dept: PHARMACY | Facility: CLINIC | Age: 77
End: 2017-07-18
Payer: MEDICARE

## 2017-07-18 DIAGNOSIS — Z79.01 LONG-TERM (CURRENT) USE OF ANTICOAGULANTS: ICD-10-CM

## 2017-07-18 DIAGNOSIS — I48.91 ATRIAL FIBRILLATION, UNSPECIFIED TYPE (H): ICD-10-CM

## 2017-07-18 LAB — INR POINT OF CARE: 5.3 (ref 0.86–1.14)

## 2017-07-18 PROCEDURE — 36416 COLLJ CAPILLARY BLOOD SPEC: CPT

## 2017-07-18 PROCEDURE — 99207 ZZC NO CHARGE NURSE ONLY: CPT

## 2017-07-18 PROCEDURE — 85610 PROTHROMBIN TIME: CPT | Mod: QW

## 2017-07-18 NOTE — MR AVS SNAPSHOT
Carlton Matosydlon   7/18/2017 9:20 AM   Anticoagulation Therapy Visit    Description:  77 year old male   Provider:  INR MG   Department:  Mg Med Monitoring           INR as of 7/18/2017     Today's INR 5.3!      Anticoagulation Summary as of 7/18/2017     INR goal 2.0-3.0   Today's INR 5.3!   Full instructions 7/19: Hold; 7/22: 2 mg; Otherwise 4 mg every day   Next INR check 7/24/2017    Indications   Atrial fibrillation (HCC) [I48.91] [I48.91]  Long-term (current) use of anticoagulants [Z79.01] [Z79.01]         Your next Anticoagulation Clinic appointment(s)     Jul 24, 2017  9:00 AM CDT   Anticoagulation Visit with INR MG   [facility]    [address] [place] [address] [phone]              Contact Numbers     [facility]  Please call the anticoagulation nurse at [phone] to cancel and/or reschedule your appointment, or with any problems or questions regarding your therapy.  You may call the main clinic number at [phone] if the nurse is not available.           July 2017 Details    Sun Mon Tue Wed Thu Fri Sat           1                 2               3               4               5               6               7               8                 9               10               11               12               13               14               15                 16               17               18      4 mg   See details      19      Hold         20      4 mg         21      4 mg         22      2 mg           23      4 mg         24            25               26               27               28               29                 30               31                     Date Details   07/18 This INR check       Date of next INR:  7/24/2017         How to take your warfarin dose     To take:  2 mg Take 1 of the 2 mg tablets.    To take:  4 mg Take 2 of the 2 mg tablets.    Hold Do not take your warfarin dose. See the  Details table to the right for additional instructions.

## 2017-07-18 NOTE — PROGRESS NOTES
ANTICOAGULATION FOLLOW-UP CLINIC VISIT    Patient Name:  Carlton Bravo  Date:  7/18/2017  Contact Type:  Face to Face    SUBJECTIVE:     Patient Findings     Positives Other complaints    Comments Patient reports he increased his vitamin K intake since last INR. Writer assessed patients health history and noted CHF - writer observed and assessed patients BLE, and noted +1-2 pitting edema. Patient reports the swelling has got worse in the last couple months. Patient is scheduled to see his cardiologist on 8/2 - writer recommended he show the provider his BLE - patient verbalized understanding. I am highly suspicious his CHF is flaring up at this time as all other factors have been ruled out at this time. Writer recommended patient utilize compression stockings and elevate BLE daily and least for 30 min 2-3 times a day. Advised patient to seek medical attention if he were to fall and injure his head - patient verbalized understanding.            OBJECTIVE    INR Protime   Date Value Ref Range Status   07/18/2017 5.3 (A) 0.86 - 1.14 Final       ASSESSMENT / PLAN  INR assessment SUPRA    Recheck INR In: 5 DAYS    INR Location Clinic      Anticoagulation Summary as of 7/18/2017     INR goal 2.0-3.0   Today's INR 5.3!   Maintenance plan 4 mg (2 mg x 2) every day   Full instructions 7/19: Hold; 7/22: 2 mg; Otherwise 4 mg every day   Weekly total 28 mg   Plan last modified Nisha Blanco RN (1/23/2017)   Next INR check 7/24/2017   Priority INR   Target end date     Indications   Atrial fibrillation (HCC) [I48.91] [I48.91]  Long-term (current) use of anticoagulants [Z79.01] [Z79.01]         Anticoagulation Episode Summary     INR check location     Preferred lab     Send INR reminders to [facility] INR    Comments possible chromogenic 10/antiphosholipid syndrome            See the Encounter Report to view Anticoagulation Flowsheet and Dosing Calendar (Go to Encounters tab in chart review, and find the Anticoagulation  Therapy Visit)      Annabelle Almaguer RN

## 2017-07-24 ENCOUNTER — ANTICOAGULATION THERAPY VISIT (OUTPATIENT)
Dept: PHARMACY | Facility: CLINIC | Age: 77
End: 2017-07-24
Payer: MEDICARE

## 2017-07-24 DIAGNOSIS — I48.91 ATRIAL FIBRILLATION, UNSPECIFIED TYPE (H): ICD-10-CM

## 2017-07-24 DIAGNOSIS — Z79.01 LONG-TERM (CURRENT) USE OF ANTICOAGULANTS: ICD-10-CM

## 2017-07-24 LAB — INR POINT OF CARE: 3.1 (ref 0.86–1.14)

## 2017-07-24 PROCEDURE — 85610 PROTHROMBIN TIME: CPT | Mod: QW

## 2017-07-24 PROCEDURE — 36416 COLLJ CAPILLARY BLOOD SPEC: CPT

## 2017-08-02 ENCOUNTER — OFFICE VISIT (OUTPATIENT)
Dept: CARDIOLOGY | Facility: CLINIC | Age: 77
End: 2017-08-02
Payer: MEDICARE

## 2017-08-02 ENCOUNTER — ANTICOAGULATION THERAPY VISIT (OUTPATIENT)
Dept: PHARMACY | Facility: CLINIC | Age: 77
End: 2017-08-02
Payer: MEDICARE

## 2017-08-02 VITALS
SYSTOLIC BLOOD PRESSURE: 160 MMHG | OXYGEN SATURATION: 95 % | DIASTOLIC BLOOD PRESSURE: 90 MMHG | WEIGHT: 231.2 LBS | TEMPERATURE: 98.6 F | BODY MASS INDEX: 29.68 KG/M2

## 2017-08-02 DIAGNOSIS — Z79.01 LONG-TERM (CURRENT) USE OF ANTICOAGULANTS: ICD-10-CM

## 2017-08-02 DIAGNOSIS — I48.91 ATRIAL FIBRILLATION, UNSPECIFIED TYPE (H): Primary | ICD-10-CM

## 2017-08-02 DIAGNOSIS — I10 HTN, GOAL BELOW 140/90: ICD-10-CM

## 2017-08-02 DIAGNOSIS — I42.9 IDIOPATHIC CARDIOMYOPATHY (H): ICD-10-CM

## 2017-08-02 DIAGNOSIS — R60.9 EDEMA, UNSPECIFIED TYPE: ICD-10-CM

## 2017-08-02 DIAGNOSIS — I48.91 ATRIAL FIBRILLATION, UNSPECIFIED TYPE (H): ICD-10-CM

## 2017-08-02 LAB — INR POINT OF CARE: 1.9 (ref 0.86–1.14)

## 2017-08-02 PROCEDURE — 85610 PROTHROMBIN TIME: CPT | Mod: QW

## 2017-08-02 PROCEDURE — 99204 OFFICE O/P NEW MOD 45 MIN: CPT | Performed by: INTERNAL MEDICINE

## 2017-08-02 PROCEDURE — 99207 ZZC NO CHARGE NURSE ONLY: CPT

## 2017-08-02 PROCEDURE — 36416 COLLJ CAPILLARY BLOOD SPEC: CPT

## 2017-08-02 RX ORDER — CARVEDILOL 6.25 MG/1
12.5 TABLET ORAL 2 TIMES DAILY
Qty: 360 TABLET | Refills: 0 | Status: SHIPPED | OUTPATIENT
Start: 2017-08-02 | End: 2017-12-03

## 2017-08-02 ASSESSMENT — PAIN SCALES - GENERAL: PAINLEVEL: NO PAIN (0)

## 2017-08-02 NOTE — PROGRESS NOTES
ANTICOAGULATION FOLLOW-UP CLINIC VISIT    Patient Name:  Carlton Bravo  Date:  8/2/2017  Contact Type:  Face to Face    SUBJECTIVE:     Patient Findings     Positives No Problem Findings           OBJECTIVE    INR Protime   Date Value Ref Range Status   08/02/2017 1.9 (A) 0.86 - 1.14 Final       ASSESSMENT / PLAN  INR assessment THER    Recheck INR In: 2 WEEKS    INR Location Clinic      Anticoagulation Summary as of 8/2/2017     INR goal 2.0-3.0   Today's INR 1.9!   Maintenance plan 4 mg (2 mg x 2) every day   Full instructions 8/7: 2 mg; 8/14: 2 mg; Otherwise 4 mg every day   Weekly total 28 mg   Plan last modified Nisha Blanco RN (1/23/2017)   Next INR check 8/16/2017   Priority INR   Target end date     Indications   Atrial fibrillation (HCC) [I48.91] [I48.91]  Long-term (current) use of anticoagulants [Z79.01] [Z79.01]         Anticoagulation Episode Summary     INR check location     Preferred lab     Send INR reminders to MG  INR    Comments possible chromogenic 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

## 2017-08-02 NOTE — PROGRESS NOTES
CC- blood pressure is high    HPI- Mr. Bravo is a patient I have seen at the [facility] clinic. He presented in December of 2007 with CHF and was found to have an EF of 25%. Coronary angiography demonstrated normal coronary arteries. He also was in atrial fibrillation. His initial attempt at cardioversion failed but after control of his heart failure sinus rhythm was restored. A follow up echocardiogram in 2009 demonstrated recovery of systolic function with an EF of 50-55%, although the LV was still mildly dilated.    I have not seen Mr. Bravo since June 2, 2011.     He reports he has been well. He simply felt it was time to be seen again. He has no complaints today.    I have reviewed his ECG. It shows atrial fibrillation with a controlled ventricular rate.    [id number] Interval history: I have not see Mr. Bravo for over three years. He reports he is here because he needs his meds refilled.     He has not noted atrial fibrillation. No chest pain/discomfort. Fairly sedentary due to knee pain. Ankle swelling at times but not other symptoms of heart failure.     Current Outpatient Prescriptions   Medication Sig Dispense Refill     carvedilol (COREG) 6.25 MG tablet Take 2 tablets (12.5 mg) by mouth 2 times daily 360 tablet 0     warfarin (COUMADIN) 2 MG tablet TAKE 2 TABLETS BY MOUTH DAILY 5 DAYS OF THE WEEK AND 3 TABS DAILY THE OTHER 2 DAYS OF THE WEEK OR  tablet 0     pravastatin (PRAVACHOL) 80 MG tablet TAKE ONE TABLET BY MOUTH ONCE DAILY 90 tablet 2     pantoprazole (PROTONIX) 40 MG EC tablet TAKE 1 TABLET BY MOUTH TWICE DAILY 180 tablet 1     methylPREDNISolone (MEDROL DOSEPAK) 4 MG tablet Follow package instructions 21 tablet 0       No Known Allergies    Past Medical History:   Diagnosis Date     CHF (congestive heart failure) (H)     no symptoms after therapy and recovery of EF     Dyslipidemia     LDL goal <100     Hypertension      Idiopathic cardiomyopathy (H) 12/2007     normal coronaries, EF 25%; f/u echo 2009 EF 50-55%     Paroxysmal atrial fibrillation (H)      Past Surgical History:   Procedure Laterality Date     KNEE SURGERY  1990    bilateral arthroscopic     Social History   Substance Use Topics     Smoking status: Never Smoker     Smokeless tobacco: Never Used     Alcohol use 0.0 oz/week     0 Standard drinks or equivalent per week      Comment: was drinking about 5 drinks a night but quit 7/2015 due to GI bleed.      Family History   Problem Relation Age of Onset     Myocardial Infarction       CEREBROVASCULAR DISEASE Father      Hypertension No family hx of      DIABETES No family hx of      Colon Cancer No family hx of      Prostate Cancer No family hx of      ROS- the ten system review is negative except as noted in the HPI. [id number]    Physical examination:  BP (!) 177/114 (BP Location: Right arm, Patient Position: Chair, Cuff Size: Adult Large)  Temp 98.6  F (37  C) (Oral)  Wt 104.9 kg (231 lb 3.2 oz)  SpO2 95%  BMI 29.68 kg/m2  He reports his BP at home is typically in the 120s    GENERAL APPEARANCE: healthy, alert and no distress  HEENT: no icterus, normal conjunctiva, no cyanosis  NECK: no venous distention, no bruits  RESPIRATORY: lungs clear to auscultation - no rales, rhonchi or wheezes  CARDIOVASCULAR: irregular, normal S1 S2, no S3 or S4 and no murmur, click or rub, precordium quiet with normal PMI  ABDOMEN: soft, non tender with normal bowel sounds, no masses, no bruits   EXTREMITIES: mild ankle edema  VASC: warm and well perfused  NEURO: alert and oriented to person, place and time, normal gait, affect and speech    Laboratory:  Results for DUSTIN FERRELL (MRN [id number]) as of 8/2/2017 09:26   Ref. Range 7/18/2017 00:00 7/24/2017 00:00 8/2/2017 00:00   INR Latest Ref Range: 0.86 - 1.14  5.3 (A) 3.1 (A) 1.9 (A)       Interpretation Summary  Mild to Moderate left ventricular dilation is present. The Ejection Fraction   is estimated at 45-50%.  Global right ventricular function is normal.   Pulmonary artery systolic pressure is normal. The inferior vena cava  is   normal. Ascending aorta 4.1 cm. No pericardial effusion is present.  PatientHeight: 74 in  PatientWeight: 227 lbs  SystolicPressure: 158 mmHg  DiastolicPressure: 98 mmHg  BSA 2.3 m^2        Procedure  Echocardiogram with two-dimensional, color and spectral Doppler performed.  Contrast Definity.  Patient was given 2.0ml mixture of 1.5ml Definity and 8.5ml saline.  IV start location LAC .     Left Ventricle  Mild to Moderate left ventricular dilation is present.  The Ejection Fraction is estimated at 45-50%.  Mild diffuse hypokinesis is present.     Right Ventricle  The right ventricle is normal size.  Global right ventricular function is normal.     Atria  Moderate to severe biatrial enlargement is present.     Mitral Valve  The mitral valve is normal.  Mild mitral insufficiency is present.     Aortic Valve  Mild aortic valve sclerosis is present.     Tricuspid Valve  The tricuspid valve is normal.  Mild tricuspid insufficiency is present.  Pulmonary artery systolic pressure is normal.     Pulmonic Valve  The pulmonic valve is normal.  Trace pulmonic insufficiency is present.     Vessels  The inferior vena cava  is normal.  Ascending aorta 4.1 cm.     Pericardium  No pericardial effusion is present.     MMode 2D Measurements & Calculations  IVSd: 1.2 cm  LVIDd: 5.8 cm  LVIDs: 4.0 cm  LVPWd: 1.3 cm  FS: 31 %  LV mass(C)d: 302 grams  Ao root diam: 3.8 cm  LA dimension: 4.6 cm  LA/Ao: 1.2   LVOT diam: 2.4 cm  EDV(MOD-bp): 0.00 ml  ESV(MOD-bp): 0.00 ml  Doppler Measurements & Calculations  MV V2 VTI: 4.6 cm  MV dec time: 0.18 sec  MR ERO: 0.07 cm^2  MR volume: 12 ml  TR Max gene: 238 cm/sec  TR Max P mmHg     Interpreting Physician:  JULIAN Mendoza MD electronically signed on 07-     Assessment and recommendations:    1) Hypertension - He reports that at home his BP is under much better  control. He typically gets readings in the 120s. A repeat manual BP check after 10 minutes of rest was 160/90.     - home BP   - bring BP machine for calibration    2) Idiopathic cardiomyopathy - he reports no dyspnea on exertion or orthopnea, he feels limited by pain in his knees. He has noted more edema in his ankles at times. This typically resolves over night.    - echocardiogram ordered    3) Atrial fibrillation - he feels like he is in and out of atrial fibrillation, on exam he is in atrial fibrillation today. If any decline in systolic function will get Holter to assess heart rate control.    His atrial size on the echocardiogram may provide an indication of whether the atrial fibrillation has been long-standing.    I appreciate the chance to help with Mr. Bravo's care. Please let me know if you have any questions or concerns.    Dakota Bentley MD

## 2017-08-02 NOTE — PATIENT INSTRUCTIONS
The following is a summary of your office visit:    Medications started today:none    Medications stopped today:none    Medication dose change: none    Nurse contact information: Valarie Odom RN  Cardiology Care Coordinator  [phone] Phone  [phone] Fax    Appointments made today: Echocardiogram and 6 month follow up    Patient instructions: Continue to monitor your blood pressure and pulse at home.      If you have had any blood work, imaging or other testing completed we will be in touch within 1-2 weeks regarding the results. If you have any questions, concerns or need to schedule a follow up, please contact us at [phone]. If you are needing refills please contact your pharmacy. For urgent after hour care please call the [place] [facility] at [phone] or the [facility] at [phone] and ask to speak to the cardiologist on call.    It was a pleasure meeting with you today. Please let us know if there is anything else we can do for you so that we can be sure you are leaving completely satisfied with your care experience.     Your Cardiology Team at [facility]  RN Care Coordinator: Valarie  CMA: Silva

## 2017-08-02 NOTE — MR AVS SNAPSHOT
Carlton Matosdylon   8/2/2017 9:00 AM   Anticoagulation Therapy Visit    Description:  77 year old male   Provider:  INR MG   Department:  Mg Med Monitoring           INR as of 8/2/2017     Today's INR 1.9!      Anticoagulation Summary as of 8/2/2017     INR goal 2.0-3.0   Today's INR 1.9!   Full instructions 8/7: 2 mg; 8/14: 2 mg; Otherwise 4 mg every day   Next INR check 8/16/2017    Indications   Atrial fibrillation (HCC) [I48.91] [I48.91]  Long-term (current) use of anticoagulants [Z79.01] [Z79.01]         Your next Anticoagulation Clinic appointment(s)     Aug 02, 2017  9:00 AM CDT   [facility]ticoagulation Visit with INR [facility]    [address] [place] [id number] [phone]            Aug 16, 2017  9:20 AM CDT   Anticoagulation Visit with INR [facility]   [facility]    [address] [place] [address] [phone]              Contact Numbers     [facility]  Please call the anticoagulation nurse at [phone] to cancel and/or reschedule your appointment, or with any problems or questions regarding your therapy.  You may call the main clinic number at [phone] if the nurse is not available.           August 2017 Details    Sun Mon Tue Wed Thu Fri Sat       1               2      4 mg   See details      3      4 mg         4      4 mg         5      4 mg           6      4 mg         7      2 mg         8      4 mg         9      4 mg         10      4 mg         11      4 mg         12      4 mg           13      4 mg         14      2 mg         15      4 mg         16            17               18               19                 20               21               22               23               24               25               26                 27               28               29               30               31                  Date Details   08/02 This INR check        Date of next INR:  8/16/2017         How to take your warfarin dose     To take:  2 mg Take 1 of the 2 mg tablets.    To take:  4 mg Take 2 of the 2 mg tablets.

## 2017-08-02 NOTE — MR AVS SNAPSHOT
After Visit Summary   8/2/2017    Carlton Bravo    MRN: [id number]           Patient Information     Date Of Birth          1940        Visit Information        Provider Department      8/2/2017 9:30 AM Dakota Bentley MD [facility]        Today's Diagnoses     Atrial fibrillation, unspecified type (H)    -  1    Idiopathic cardiomyopathy (H)        HTN, goal below 140/90        Edema, unspecified type          Care Instructions      The following is a summary of your office visit:    Medications started today:none    Medications stopped today:none    Medication dose change: none    Nurse contact information: Valarie Odom RN  Cardiology Care Coordinator  [phone] Phone  [phone] Fax    Appointments made today: Echocardiogram and 6 month follow up    Patient instructions: Continue to monitor your blood pressure and pulse at home.      If you have had any blood work, imaging or other testing completed we will be in touch within 1-2 weeks regarding the results. If you have any questions, concerns or need to schedule a follow up, please contact us at [phone]. If you are needing refills please contact your pharmacy. For urgent after hour care please call the [place] [facility] at [phone] or the [facility] at [phone] and ask to speak to the cardiologist on call.    It was a pleasure meeting with you today. Please let us know if there is anything else we can do for you so that we can be sure you are leaving completely satisfied with your care experience.     Your Cardiology Team at [facility]  RN Care Coordinator: Valarie Ascencio                    Follow-ups after your visit        Your next 10 appointments already scheduled     Aug 16, 2017  9:20 AM CDT   [facility]agulation [facility] with INR [facility]   [facility]    [address] [address] [place] [id number]    [phone]            Aug 16, 2017  9:30 AM CDT   Ech Complete with MGECHLUKAS, MG ECHO TECH   [facility]    [address] [place] [address] [phone]           1. Please bring or wear a comfortable two-piece outfit. 2. You may eat, drink and take your normal medicines. 3. For any questions that cannot be answered, please contact the ordering physician            Feb 21, 2018 10:30 AM CST   Return Visit with Dakota Bentley MD   [facility] ([facility])    [address] [place] [address] [phone]              Future tests that were ordered for you today     Open Future Orders        Priority Expected Expires Ordered    Echocardiogram Complete Routine  8/2/2018 8/2/2017            Who to contact     If you have questions or need follow up information about today's clinic visit or your schedule please contact [facility] directly at [phone].  Normal or non-critical lab and imaging results will be communicated to you by Apptimate[employer], letter or phone within 4 business days after the clinic has received the results. If you do not hear from us within 7 days, please contact the clinic through [employer] or phone. If you have a critical or abnormal lab result, we will notify you by phone as soon as possible.  Submit refill requests through [employer] or call your pharmacy and they will forward the refill request to us. Please allow 3 business days for your refill to be completed.          Additional Information About Your Visit        [employer] Information     [employer] is an electronic gateway that provides easy, online access to your medical records. With [employer], you can request a clinic appointment, read your test results, renew a prescription or communicate with your care team.     To sign up for [employer] visit the website at [email or web address]   You will be asked to enter  the access code listed below, as well as some personal information. Please follow the directions to create your username and password.     Your access code is: [email or web address]  Expires: 10/31/2017  9:51 AM     Your access code will  in 90 days. If you need help or a new code, please contact your [facility] or call [phone] for assistance.        Care EveryWhere ID     This is your Care EveryWhere ID. This could be used by other organizations to access your [facility] medical records  [id number]        Your Vitals Were     Temperature Pulse Oximetry BMI (Body Mass Index)             98.6  F (37  C) (Oral) 95% 29.68 kg/m2          Blood Pressure from Last 3 Encounters:   17 160/90   17 122/72   16 125/60    Weight from Last 3 Encounters:   17 104.9 kg (231 lb 3.2 oz)   17 101.2 kg (223 lb)   16 102 kg (224 lb 14.4 oz)                 Where to get your medicines      These medications were sent to [place] [address] [place] [facility] [place], [place] - [address] [address]  [address] [address]     Phone:  [phone]     carvedilol 6.25 MG tablet          Primary Care Provider Office Phone # Fax #    Lona Zelaya Wilver, APRN [phone]       [facility] [address]  [place] [address]        Equal Access to Services     BRANDAN LEWIS AH: Hadii aad ku hadasho Soomaali, waaxda luqadaha, qaybta kaalmada adeegyada, laila nichols . So [facility] [phone].    ATENCIÓN: Si habla español, tiene a chen disposición servicios gratuitos de asistencia lingüística. Llame al [phone].    We comply with applicable federal civil rights laws and [place] laws. We do not discriminate on the basis of race, color, national origin, age, disability sex, sexual orientation or gender identity.            Thank you!     Thank you for choosing [facility]  for your care. Our goal  is always to provide you with excellent care. Hearing back from our patients is one way we can continue to improve our services. Please take a few minutes to complete the written survey that you may receive in the mail after your visit with us. Thank you!             Your Updated Medication List - Protect others around you: Learn how to safely use, store and throw away your medicines at www.disposemymeds.org.          This list is accurate as of: 8/2/17  9:58 AM.  Always use your most recent med list.                   Brand Name Dispense Instructions for use Diagnosis    carvedilol 6.25 MG tablet    COREG    360 tablet    Take 2 tablets (12.5 mg) by mouth 2 times daily    Idiopathic cardiomyopathy (H), HTN, goal below 140/90       methylPREDNISolone 4 MG tablet    MEDROL DOSEPAK    21 tablet    Follow package instructions    Lumbar radiculopathy       pantoprazole 40 MG EC tablet    PROTONIX    180 tablet    TAKE 1 TABLET BY MOUTH TWICE DAILY    Gastric ulcer with hemorrhage, acute       pravastatin 80 MG tablet    PRAVACHOL    90 tablet    TAKE ONE TABLET BY MOUTH ONCE DAILY    Dyslipidemia       warfarin 2 MG tablet    COUMADIN    180 tablet    TAKE 2 TABLETS BY MOUTH DAILY 5 DAYS OF THE WEEK AND 3 TABS DAILY THE OTHER 2 DAYS OF THE WEEK OR AS    Atrial fibrillation (H)

## 2017-08-02 NOTE — NURSING NOTE
"Carlton Bravo's goals for this visit include:   Chief Complaint   Patient presents with     RECHECK     HTN and afib       He requests these members of his care team be copied on today's visit information: PCP    PCP: Lona Pettit    Referring Provider:  ESTABLISHED PATIENT  No address on file    Chief Complaint   Patient presents with     RECHECK     HTN and afib       Initial BP (!) 177/114 (BP Location: Right arm, Patient Position: Chair, Cuff Size: Adult Large)  Temp 98.6  F (37  C) (Oral)  Wt 104.9 kg (231 lb 3.2 oz)  SpO2 95%  BMI 29.68 kg/m2 Estimated body mass index is 29.68 kg/(m^2) as calculated from the following:    Height as of 1/23/17: 1.88 m (6' 2\").    Weight as of this encounter: 104.9 kg (231 lb 3.2 oz).  Medication Reconciliation: complete       Medication Refills: Yes      Silva Troncoso CMA        "

## 2017-08-16 ENCOUNTER — ANTICOAGULATION THERAPY VISIT (OUTPATIENT)
Dept: PHARMACY | Facility: CLINIC | Age: 77
End: 2017-08-16
Payer: MEDICARE

## 2017-08-16 ENCOUNTER — RADIANT APPOINTMENT (OUTPATIENT)
Dept: CARDIOLOGY | Facility: CLINIC | Age: 77
End: 2017-08-16
Attending: INTERNAL MEDICINE
Payer: MEDICARE

## 2017-08-16 DIAGNOSIS — I10 HTN, GOAL BELOW 140/90: ICD-10-CM

## 2017-08-16 DIAGNOSIS — R60.9 EDEMA, UNSPECIFIED TYPE: ICD-10-CM

## 2017-08-16 DIAGNOSIS — I48.91 ATRIAL FIBRILLATION, UNSPECIFIED TYPE (H): ICD-10-CM

## 2017-08-16 DIAGNOSIS — Z79.01 LONG-TERM (CURRENT) USE OF ANTICOAGULANTS: ICD-10-CM

## 2017-08-16 LAB — INR POINT OF CARE: 2.7 (ref 0.86–1.14)

## 2017-08-16 PROCEDURE — 99207 ZZC NO CHARGE NURSE ONLY: CPT

## 2017-08-16 PROCEDURE — 36416 COLLJ CAPILLARY BLOOD SPEC: CPT

## 2017-08-16 PROCEDURE — 93306 TTE W/DOPPLER COMPLETE: CPT

## 2017-08-16 PROCEDURE — 85610 PROTHROMBIN TIME: CPT | Mod: QW

## 2017-08-16 NOTE — MR AVS SNAPSHOT
Carlton BAIRES Shelly   8/16/2017 9:20 AM   Anticoagulation Therapy Visit    Description:  77 year old male   Provider:  INR MG   Department:  Mg Med Monitoring           INR as of 8/16/2017     Today's INR 2.7      Anticoagulation Summary as of 8/16/2017     INR goal 2.0-3.0   Today's INR 2.7   Full instructions 2 mg on Mon; 4 mg all other days   Next INR check 9/13/2017    Indications   Atrial fibrillation (HCC) [I48.91] [I48.91]  Long-term (current) use of anticoagulants [Z79.01] [Z79.01]         Your next Anticoagulation Clinic appointment(s)     Aug 16, 2017  9:20 AM CDT   Anticoagulation Visit with INR MG   [facility]    [address] [place] [address] [phone]            Sep 13, 2017  9:00 AM CDT   Anticoagulation Visit with INR MG   [facility])    [address] [place] [address] [phone]              Contact Numbers     [facility]  Please call the anticoagulation nurse at [phone] to cancel and/or reschedule your appointment, or with any problems or questions regarding your therapy.  You may call the main clinic number at [phone] if the nurse is not available.           August 2017 Details    Sun Mon Tue Wed Thu Fri Sat       1               2               3               4               5                 6               7               8               9               10               11               12                 13               14               15               16      4 mg   See details      17      4 mg         18      4 mg         19      4 mg           20      4 mg         21      2 mg         22      4 mg         23      4 mg         24      4 mg         25      4 mg         26      4 mg           27      4 mg         28      2 mg         29      4 mg         30      4 mg         31      4 mg            Date Details   08/16 This INR check                How to take your warfarin dose     To take:  2 mg Take 1 of the 2 mg tablets.    To take:  4 mg Take 2 of the 2 mg tablets.           September 2017 Details    Sun Mon Tue Wed Thu Fri Sat          1      4 mg         2      4 mg           3      4 mg         4      2 mg         5      4 mg         6      4 mg         7      4 mg         8      4 mg         9      4 mg           10      4 mg         11      2 mg         12      4 mg         13            14               15               16                 17               18               19               20               21               22               23                 24               25               26               27               28               29               30                Date Details   No additional details    Date of next INR:  9/13/2017         How to take your warfarin dose     To take:  2 mg Take 1 of the 2 mg tablets.    To take:  4 mg Take 2 of the 2 mg tablets.

## 2017-08-16 NOTE — PROGRESS NOTES
ANTICOAGULATION FOLLOW-UP CLINIC VISIT    Patient Name:  Carlton Bravo  Date:  8/16/2017  Contact Type:  Face to Face    SUBJECTIVE:     Patient Findings     Positives No Problem Findings           OBJECTIVE    INR Protime   Date Value Ref Range Status   08/16/2017 2.7 (A) 0.86 - 1.14 Final       ASSESSMENT / PLAN  INR assessment THER    Recheck INR In: 4 WEEKS    INR Location Clinic      Anticoagulation Summary as of 8/16/2017     INR goal 2.0-3.0   Today's INR 2.7   Maintenance plan 2 mg (2 mg x 1) on Mon; 4 mg (2 mg x 2) all other days   Full instructions 2 mg on Mon; 4 mg all other days   Weekly total 26 mg   Plan last modified Annabelle Almaguer RN (8/16/2017)   Next INR check 9/13/2017   Priority INR   Target end date     Indications   Atrial fibrillation (HCC) [I48.91] [I48.91]  Long-term (current) use of anticoagulants [Z79.01] [Z79.01]         Anticoagulation Episode Summary     INR check location     Preferred lab     Send INR reminders to [facility] INR    Comments possible chromogenic 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

## 2017-08-22 ENCOUNTER — TELEPHONE (OUTPATIENT)
Dept: NURSING | Facility: CLINIC | Age: 77
End: 2017-08-22

## 2017-08-22 NOTE — TELEPHONE ENCOUNTER
Called and spoke to patient. Informed that Dr Bentley had reviewed echo and his comments. He had no questions.

## 2017-08-31 DIAGNOSIS — I48.91 ATRIAL FIBRILLATION (H): ICD-10-CM

## 2017-09-01 RX ORDER — WARFARIN SODIUM 2 MG/1
TABLET ORAL
Qty: 180 TABLET | Refills: 0 | Status: SHIPPED | OUTPATIENT
Start: 2017-09-01 | End: 2017-12-03

## 2017-09-01 NOTE — TELEPHONE ENCOUNTER
Prescription approved per [facility] Refill Protocol.    Warfarin 2 mg tablets    Last Written Prescription Date: 5/22/17  Last Fill Qty: 180, # refills: 0  Last Office Visit with [facility], [facility] or [facility] prescribing provider: 1/23/17.r       Date and Result of Last PT/INR:   Lab Results   Component Value Date    INR 2.7 08/16/2017    INR 1.9 08/02/2017    INR 1.08 07/22/2016    INR 1.08 07/22/2016          Nisha Blanco RN, [facility]

## 2017-09-13 ENCOUNTER — ALLIED HEALTH/NURSE VISIT (OUTPATIENT)
Dept: PEDIATRICS | Facility: CLINIC | Age: 77
End: 2017-09-13
Payer: MEDICARE

## 2017-09-13 DIAGNOSIS — Z23 NEED FOR PROPHYLACTIC VACCINATION AND INOCULATION AGAINST INFLUENZA: Primary | ICD-10-CM

## 2017-09-13 PROCEDURE — 90662 IIV NO PRSV INCREASED AG IM: CPT

## 2017-09-13 PROCEDURE — 99207 ZZC NO CHARGE NURSE ONLY: CPT

## 2017-09-13 PROCEDURE — G0008 ADMIN INFLUENZA VIRUS VAC: HCPCS

## 2017-09-13 NOTE — NURSING NOTE
Carlton Bravo comes into clinic today for a flu vaccine.          This service provided today was under the supervising provider of the day Dr. Amaya, who was available if needed.    Reason for visit: Flu Shot    Lucrecia Oliver

## 2017-09-13 NOTE — PROGRESS NOTES
Injectable Influenza Immunization Documentation    1.  Are you sick today? (Fever of 100.5 or higher on the day of the clinic)   No    2.  Have you ever had Guillain-Jeffrey Syndrome within 6 weeks of an influenza vaccionation?  No    3. Do you have a life-threatening allergy to eggs?  No    4. Do you have a life-threatening allergy to a component of the vaccine? May include antibiotics, gelatin or latex.  No     5. Have you ever had a reaction to a dose of flu vaccine that needed immediate medical attention?  No     Form completed by Lucrecia Oliver

## 2017-09-13 NOTE — MR AVS SNAPSHOT
After Visit Summary   9/13/2017    Carlton Bravo    MRN: [id number]           Patient Information     Date Of Birth          1940        Visit Information        Provider Department      9/13/2017 9:40 AM MG ANCILLARY [facility]        Today's Diagnoses     Need for prophylactic vaccination and inoculation against influenza    -  1       Follow-ups after your visit        Your next 10 appointments already scheduled     Sep 13, 2017  9:40 AM CDT   Nurse Only with MG ANCILLARY   [facility] ([facility])    [address] [place] [id number] [phone]            Oct 25, 2017  9:00 AM CDT   Anticoagulation Visit with INR MG   [facility])    [address] [place] [id number] [phone]            Feb 21, 2018 10:30 AM CST   Return Visit with Dakota Bentley MD   [facility])    [id number] [place] [phone] [phone]              Who to contact     If you have questions or need follow up information about today's clinic visit or your schedule please contact [facility] directly at [phone].  Normal or non-critical lab and imaging results will be communicated to you by [employer]ethart, letter or phone within 4 business days after the clinic has received the results. If you do not hear from us within 7 days, please contact the clinic through [employer] or phone. If you have a critical or abnormal lab result, we will notify you by phone as soon as possible.  Submit refill requests through [employer] or call your pharmacy and they will forward the refill request to us. Please allow 3 business days for your refill to be completed.          Additional Information About Your Visit        MaintenanceNetharGeeklist Information     [employer] is an electronic gateway that provides easy, online access to your medical  records. With InVision, you can request a clinic appointment, read your test results, renew a prescription or communicate with your care team.     To sign up for [employer] visit the website at [email or web address]   You will be asked to enter the access code listed below, as well as some personal information. Please follow the directions to create your username and password.     Your access code is: [email or web address]  Expires: 10/31/2017  9:51 AM     Your access code will  in 90 days. If you need help or a new code, please contact your [facility] or call [phone] for assistance.        Care EveryWhere ID     This is your Care EveryWhere ID. This could be used by other organizations to access your [facility] medical records  [id number]         Blood Pressure from Last 3 Encounters:   17 160/90   17 122/72   16 125/60    Weight from Last 3 Encounters:   17 231 lb 3.2 oz (104.9 kg)   17 223 lb (101.2 kg)   16 224 lb 14.4 oz (102 kg)              We Performed the Following     ADMIN INFLUENZA (For MEDICARE Patients ONLY) []     FLU VACCINE, INCREASED ANTIGEN, PRESV FREE, AGE 65+ [80079]        Primary Care Provider Office Phone # Fax #    Lona Zelaya NI [facility] [phone]       [address]  [place] [address]        Equal Access to Services     BRANDAN LEWIS : Hadii aad ku hadasho Soomaali, waaxda luqadaha, qaybta kaalmada adeegyada, waxay shastain haydalila nichols . So [facility] [phone].    ATENCIÓN: Si habla español, tiene a chen disposición servicios gratuitos de asistencia lingüística. Llame al [phone].    We comply with applicable federal civil rights laws and [place] laws. We do not discriminate on the basis of race, color, national origin, age, disability sex, sexual orientation or gender identity.            Thank you!     Thank you for choosing [facility]  for  your care. Our goal is always to provide you with excellent care. Hearing back from our patients is one way we can continue to improve our services. Please take a few minutes to complete the written survey that you may receive in the mail after your visit with us. Thank you!             Your Updated Medication List - Protect others around you: Learn how to safely use, store and throw away your medicines at www.disposemymeds.org.          This list is accurate as of: 9/13/17  9:12 AM.  Always use your most recent med list.                   Brand Name Dispense Instructions for use Diagnosis    carvedilol 6.25 MG tablet    COREG    360 tablet    Take 2 tablets (12.5 mg) by mouth 2 times daily    Idiopathic cardiomyopathy (H), HTN, goal below 140/90       methylPREDNISolone 4 MG tablet    MEDROL DOSEPAK    21 tablet    Follow package instructions    Lumbar radiculopathy       pantoprazole 40 MG EC tablet    PROTONIX    180 tablet    TAKE 1 TABLET BY MOUTH TWICE DAILY    Gastric ulcer with hemorrhage, acute       pravastatin 80 MG tablet    PRAVACHOL    90 tablet    TAKE ONE TABLET BY MOUTH ONCE DAILY    Dyslipidemia       warfarin 2 MG tablet    COUMADIN    180 tablet    TAKE 2 TABS BY MOUTH DAILY 5 DAYS OF THE WEEK & 3 TABS DAILY THE OTHER 2 DAYS OF THE WEEK.    Atrial fibrillation (H)

## 2017-10-12 DIAGNOSIS — E78.5 DYSLIPIDEMIA: ICD-10-CM

## 2017-10-13 RX ORDER — PRAVASTATIN SODIUM 80 MG/1
TABLET ORAL
Qty: 90 TABLET | Refills: 0 | Status: SHIPPED | OUTPATIENT
Start: 2017-10-13 | End: 2018-01-04

## 2017-10-13 NOTE — TELEPHONE ENCOUNTER
pravastatin (PRAVACHOL) 80 MG tablet  Last Written Prescription Date: 2/8/2017  Last Fill Quantity: 90, # refills: 2  Last Office Visit with [facility], [facility] or [facility] prescribing provider: 1/23/2017       Lab Results   Component Value Date    CHOL 198 03/06/2017     Lab Results   Component Value Date     03/06/2017     Lab Results   Component Value Date    LDL 79 03/06/2017     Lab Results   Component Value Date    TRIG 75 03/06/2017     Lab Results   Component Value Date    CHOLHDLRATIO 3.2 10/20/2015     Refilled per [facility] protocol.    Annabelle Almaguer RN

## 2017-10-25 ENCOUNTER — ANTICOAGULATION THERAPY VISIT (OUTPATIENT)
Dept: PHARMACY | Facility: CLINIC | Age: 77
End: 2017-10-25
Payer: MEDICARE

## 2017-10-25 DIAGNOSIS — Z79.01 LONG-TERM (CURRENT) USE OF ANTICOAGULANTS: ICD-10-CM

## 2017-10-25 LAB — INR POINT OF CARE: 2.5 (ref 0.86–1.14)

## 2017-10-25 PROCEDURE — 99207 ZZC NO CHARGE NURSE ONLY: CPT

## 2017-10-25 PROCEDURE — 85610 PROTHROMBIN TIME: CPT | Mod: QW

## 2017-10-25 PROCEDURE — 36416 COLLJ CAPILLARY BLOOD SPEC: CPT

## 2017-10-25 NOTE — PROGRESS NOTES
ANTICOAGULATION FOLLOW-UP CLINIC VISIT    Patient Name:  Carlton Bravo  Date:  10/25/2017  Contact Type:  Face to Face    SUBJECTIVE:     Patient Findings     Positives No Problem Findings           OBJECTIVE    INR Protime   Date Value Ref Range Status   10/25/2017 2.5 (A) 0.86 - 1.14 Final       ASSESSMENT / PLAN  INR assessment THER    Recheck INR In: 6 WEEKS    INR Location Clinic      Anticoagulation Summary as of 10/25/2017     INR goal 2.0-3.0   Today's INR 2.5   Maintenance plan 2 mg (2 mg x 1) on Mon; 4 mg (2 mg x 2) all other days   Full instructions 2 mg on Mon; 4 mg all other days   Weekly total 26 mg   No change documented Annabelle Almaguer RN   Plan last modified Annabelle Almaguer RN (8/16/2017)   Next INR check 12/6/2017   Priority INR   Target end date     Indications   Atrial fibrillation (HCC) [I48.91] [I48.91]  Long-term (current) use of anticoagulants [Z79.01] [Z79.01]         Anticoagulation Episode Summary     INR check location     Preferred lab     Send INR reminders to MG  INR    Comments possible chromogenic 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

## 2017-10-25 NOTE — MR AVS SNAPSHOT
Carlton BAIRES Shelly   10/25/2017 9:00 AM   Anticoagulation Therapy Visit    Description:  77 year old male   Provider:  INR MG   Department:  Mg Med Monitoring           INR as of 10/25/2017     Today's INR 2.5      Anticoagulation Summary as of 10/25/2017     INR goal 2.0-3.0   Today's INR 2.5   Full instructions 2 mg on Mon; 4 mg all other days   Next INR check 12/6/2017    Indications   Atrial fibrillation (HCC) [I48.91] [I48.91]  Long-term (current) use of anticoagulants [Z79.01] [Z79.01]         Your next Anticoagulation Clinic appointment(s)     Oct 25, 2017  9:00 AM CDT   Anticoagulation Visit with INR MG   [facility]    [address] [place] [address] [phone]            Dec 06, 2017  9:00 AM CST   Anticoagulation Visit with INR MG   [facility])    [address] [place] [address] [phone]              Contact Numbers     [facility]  Please call the anticoagulation nurse at [phone] to cancel and/or reschedule your appointment, or with any problems or questions regarding your therapy.  You may call the main clinic number at [phone] if the nurse is not available.           October 2017 Details    Sun Mon Tue Wed Thu Fri Sat     1               2               3               4               5               6               7                 8               9               10               11               12               13               14                 15               16               17               18               19               20               21                 22               23               24               25      4 mg   See details      26      4 mg         27      4 mg         28      4 mg           29      4 mg         30      2 mg         31      4 mg              Date Details   10/25 This INR check               How to take your  warfarin dose     To take:  2 mg Take 1 of the 2 mg tablets.    To take:  4 mg Take 2 of the 2 mg tablets.           November 2017 Details    Sun Mon Tue Wed Thu Fri Sat        1      4 mg         2      4 mg         3      4 mg         4      4 mg           5      4 mg         6      2 mg         7      4 mg         8      4 mg         9      4 mg         10      4 mg         11      4 mg           12      4 mg         13      2 mg         14      4 mg         15      4 mg         16      4 mg         17      4 mg         18      4 mg           19      4 mg         20      2 mg         21      4 mg         22      4 mg         23      4 mg         24      4 mg         25      4 mg           26      4 mg         27      2 mg         28      4 mg         29      4 mg         30      4 mg            Date Details   No additional details            How to take your warfarin dose     To take:  2 mg Take 1 of the 2 mg tablets.    To take:  4 mg Take 2 of the 2 mg tablets.           December 2017 Details    Sun Mon Tue Wed Thu Fri Sat          1      4 mg         2      4 mg           3      4 mg         4      2 mg         5      4 mg         6            7               8               9                 10               11               12               13               14               15               16                 17               18               19               20               21               22               23                 24               25               26               27               28               29               30                 31                      Date Details   No additional details    Date of next INR:  12/6/2017         How to take your warfarin dose     To take:  2 mg Take 1 of the 2 mg tablets.    To take:  4 mg Take 2 of the 2 mg tablets.

## 2017-12-03 DIAGNOSIS — I10 HTN, GOAL BELOW 140/90: ICD-10-CM

## 2017-12-03 DIAGNOSIS — I48.91 ATRIAL FIBRILLATION (H): ICD-10-CM

## 2017-12-03 DIAGNOSIS — I42.9 IDIOPATHIC CARDIOMYOPATHY (H): ICD-10-CM

## 2017-12-04 RX ORDER — CARVEDILOL 6.25 MG/1
TABLET ORAL
Qty: 360 TABLET | Refills: 0 | Status: SHIPPED | OUTPATIENT
Start: 2017-12-04 | End: 2018-04-02

## 2017-12-04 RX ORDER — WARFARIN SODIUM 2 MG/1
TABLET ORAL
Qty: 180 TABLET | Refills: 0 | Status: SHIPPED | OUTPATIENT
Start: 2017-12-04 | End: 2018-02-25

## 2017-12-04 NOTE — TELEPHONE ENCOUNTER
warfarin (COUMADIN) 2 MG tablet  Last Written Prescription Date: 9/1/2017  Last Fill Qty: 180, # refills: 0  Last Office Visit with [facility], [facility] or [facility] prescribing provider: 1/23/2017  Next 5 appointments (look out 90 days)     Feb 21, 2018 10:30 AM CST   Return Visit with Dakota Bentley MD   [facility])    [address] [place] [address]   [phone]                   Date and Result of Last PT/INR:   Lab Results   Component Value Date    INR 2.5 10/25/2017    INR 2.4 09/13/2017    INR 1.08 07/22/2016    INR 1.08 07/22/2016        carvedilol (COREG) 6.25 MG tablet        Last Written Prescription Date: 8/2/2017  Last Fill Quantity: 360, # refills: 0    Last Office Visit with [facility], [facility] or [facility] prescribing provider:  1/23/2017   Future Office Visit:    Next 5 appointments (look out 90 days)     Feb 21, 2018 10:30 AM CST   Return Visit with Dakota Bentley MD   [facility])    [address] [place] [address]   [phone]                   Lab Results   Component Value Date    WBC 5.5 07/22/2016     Lab Results   Component Value Date    RBC 2.93 07/22/2016     Lab Results   Component Value Date    HGB 9.9 07/22/2016     Lab Results   Component Value Date    HCT 30.6 07/22/2016     No components found for: MCT  Lab Results   Component Value Date     07/22/2016     Lab Results   Component Value Date    MCH 33.8 07/22/2016     Lab Results   Component Value Date    MCHC 32.4 07/22/2016     Lab Results   Component Value Date    RDW 15.6 07/22/2016     Lab Results   Component Value Date     07/22/2016     Lab Results   Component Value Date    AST 22 03/06/2017     Lab Results   Component Value Date    ALT 27 03/06/2017     Creatinine   Date Value Ref Range Status   03/06/2017 0.92 0.66 - 1.25 mg/dL Final   Refilled per [facility] protocol.    Annabelle Almaguer RN

## 2017-12-13 ENCOUNTER — ANTICOAGULATION THERAPY VISIT (OUTPATIENT)
Dept: PHARMACY | Facility: CLINIC | Age: 77
End: 2017-12-13
Payer: MEDICARE

## 2017-12-13 DIAGNOSIS — Z79.01 LONG-TERM (CURRENT) USE OF ANTICOAGULANTS: ICD-10-CM

## 2017-12-13 DIAGNOSIS — I48.91 ATRIAL FIBRILLATION (H): ICD-10-CM

## 2017-12-13 LAB — INR POINT OF CARE: 2.7 (ref 0.86–1.14)

## 2017-12-13 PROCEDURE — 36416 COLLJ CAPILLARY BLOOD SPEC: CPT

## 2017-12-13 PROCEDURE — 99207 ZZC NO CHARGE NURSE ONLY: CPT

## 2017-12-13 PROCEDURE — 85610 PROTHROMBIN TIME: CPT | Mod: QW

## 2017-12-13 NOTE — MR AVS SNAPSHOT
Carlton Matosdylon   12/13/2017 9:00 AM   Anticoagulation Therapy Visit    Description:  77 year old male   Provider:  INR MG   Department:  Mg Med Monitoring           INR as of 12/13/2017     Today's INR 2.7      Anticoagulation Summary as of 12/13/2017     INR goal 2.0-3.0   Today's INR 2.7   Full instructions 2 mg on Mon; 4 mg all other days   Next INR check 1/24/2018    Indications   Atrial fibrillation (HCC) [I48.91] [I48.91]  Long-term (current) use of anticoagulants [Z79.01] [Z79.01]         Your next Anticoagulation Clinic appointment(s)     Jan 24, 2018  9:00 AM CST   [facility] INR [facility]    [address] [place] [address] [phone]              Contact Numbers     [facility]  Please call the anticoagulation nurse at [phone] to cancel and/or reschedule your appointment, or with any problems or questions regarding your therapy.  You may call the main clinic number at [phone] if the nurse is not available.           December 2017 Details    Sun Mon Tue Wed Thu Fri Sat          1               2                 3               4               5               6               7               8               9                 10               11               12               13      4 mg   See details      14      4 mg         15      4 mg         16      4 mg           17      4 mg         18      2 mg         19      4 mg         20      4 mg         21      4 mg         22      4 mg         23      4 mg           24      4 mg         25      2 mg         26      4 mg         27      4 mg         28      4 mg         29      4 mg         30      4 mg           31      4 mg                Date Details   12/13 This INR check               How to take your warfarin dose     To take:  2 mg Take 1 of the 2 mg tablets.    To take:  4 mg Take 2 of the 2 mg tablets.           January 2018 Details    [facility]  Mon Tue Wed Thu Fri Sat      1      2 mg         2      4 mg         3      4 mg         4      4 mg         5      4 mg         6      4 mg           7      4 mg         8      2 mg         9      4 mg         10      4 mg         11      4 mg         12      4 mg         13      4 mg           14      4 mg         15      2 mg         16      4 mg         17      4 mg         18      4 mg         19      4 mg         20      4 mg           21      4 mg         22      2 mg         23      4 mg         24            25               26               27                 28               29               30               31                   Date Details   No additional details    Date of next INR:  1/24/2018         How to take your warfarin dose     To take:  2 mg Take 1 of the 2 mg tablets.    To take:  4 mg Take 2 of the 2 mg tablets.

## 2017-12-13 NOTE — PROGRESS NOTES
ANTICOAGULATION FOLLOW-UP CLINIC VISIT    Patient Name:  Carlton Bravo  Date:  12/13/2017  Contact Type:  Face to Face    SUBJECTIVE:     Patient Findings     Positives No Problem Findings           OBJECTIVE    INR Protime   Date Value Ref Range Status   12/13/2017 2.7 (A) 0.86 - 1.14 Final       ASSESSMENT / PLAN  INR assessment THER    Recheck INR In: 6 WEEKS    INR Location Clinic      Anticoagulation Summary as of 12/13/2017     INR goal 2.0-3.0   Today's INR 2.7   Maintenance plan 2 mg (2 mg x 1) on Mon; 4 mg (2 mg x 2) all other days   Full instructions 2 mg on Mon; 4 mg all other days   Weekly total 26 mg   No change documented Annabelle Almaguer RN   Plan last modified Annabelle Almaguer RN (8/16/2017)   Next INR check 1/24/2018   Priority INR   Target end date     Indications   Atrial fibrillation (HCC) [I48.91] [I48.91]  Long-term (current) use of anticoagulants [Z79.01] [Z79.01]         Anticoagulation Episode Summary     INR check location     Preferred lab     Send INR reminders to MG  INR    Comments possible chromogenic 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

## 2017-12-24 DIAGNOSIS — K25.0 ACUTE GASTRIC ULCER WITH HEMORRHAGE: ICD-10-CM

## 2017-12-26 RX ORDER — PANTOPRAZOLE SODIUM 40 MG/1
TABLET, DELAYED RELEASE ORAL
Qty: 180 TABLET | Refills: 0 | Status: SHIPPED | OUTPATIENT
Start: 2017-12-26 | End: 2018-04-28

## 2017-12-26 NOTE — TELEPHONE ENCOUNTER
pantoprazole (PROTONIX) 40 MG EC tablet      Last Written Prescription Date: 2/8/2017  Last Fill Quantity: 180,  # refills: 1   Last Office Visit with [facility] or [facility] prescribing provider: 1/23/2017                                         Next 5 appointments (look out 90 days)     Mar 21, 2018 11:00 AM CDT   Return Visit with Dakota Bentley MD   [facility])    [address] [place] [phone]                  Refilled per [facility] protocol.    Annabelle Almaguer RN

## 2018-01-04 DIAGNOSIS — E78.5 DYSLIPIDEMIA: ICD-10-CM

## 2018-01-04 NOTE — LETTER
January 5, 2018      Carlton Bravo  [address]  [facility] [address]              Dear Carlton,      We recently received a call from your pharmacy requesting a refill of your medication. We have provided a 90 day refill of your medication, pravastatin (PRAVACHOL) 80 MG tablet, as requested.      A review of your chart indicates that your last office visit was on 1/3/2017.  An appointment is required yearly with your provider.    We have authorized one time 90 day refill of your medication to allow time for you to schedule.     If you have a history of diabetes or high cholesterol, please come in fasting for the appointment. Fasting entails nothing to eat or drink 8 hours prior to your appointment; with the exception on water. You may take your medication the day of the appointment.    Please call the clinic to schedule your appointment.    Thank you for taking an active role in your healthcare.      Sincerely,    [facility]

## 2018-01-05 RX ORDER — PRAVASTATIN SODIUM 80 MG/1
TABLET ORAL
Qty: 90 TABLET | Refills: 0 | Status: SHIPPED | OUTPATIENT
Start: 2018-01-05 | End: 2018-04-08

## 2018-01-05 NOTE — TELEPHONE ENCOUNTER
pravastatin (PRAVACHOL) 80 MG tablet   Last Written Prescription Date: 10/13/2017  Last Fill Quantity: 90, # refills: 0  Last Office Visit with G, P or [facility] prescribing provider: 1/23/2017  Next 5 appointments (look out 90 days)     Mar 21, 2018 11:00 AM CDT   Return Visit with Dakota Bentley MD   [facility] ([facility])    [address] [place] [address]   [phone]                   Lab Results   Component Value Date    CHOL 198 03/06/2017     Lab Results   Component Value Date     03/06/2017     Lab Results   Component Value Date    LDL 79 03/06/2017     Lab Results   Component Value Date    TRIG 75 03/06/2017     Lab Results   Component Value Date    CHOLHDLRATIO 3.2 10/20/2015     Refilled per [facility] protocol. Letter sent to patient indicating upcoming due annual OV.     Annabelle Almaguer RN

## 2018-01-30 ENCOUNTER — ANTICOAGULATION THERAPY VISIT (OUTPATIENT)
Dept: PHARMACY | Facility: CLINIC | Age: 78
End: 2018-01-30
Payer: MEDICARE

## 2018-01-30 DIAGNOSIS — Z79.01 LONG-TERM (CURRENT) USE OF ANTICOAGULANTS: ICD-10-CM

## 2018-01-30 DIAGNOSIS — I48.91 ATRIAL FIBRILLATION (H): ICD-10-CM

## 2018-01-30 LAB — INR POINT OF CARE: 2.1 (ref 0.86–1.14)

## 2018-01-30 PROCEDURE — 85610 PROTHROMBIN TIME: CPT | Mod: QW

## 2018-01-30 PROCEDURE — 99207 ZZC NO CHARGE NURSE ONLY: CPT

## 2018-01-30 PROCEDURE — 36416 COLLJ CAPILLARY BLOOD SPEC: CPT

## 2018-01-30 NOTE — MR AVS SNAPSHOT
Carlton Matosdylon   1/30/2018 9:00 AM   Anticoagulation Therapy Visit    Description:  77 year old male   Provider:  INR MG   Department:  Mg Med Monitoring           INR as of 1/30/2018     Today's INR 2.1      Anticoagulation Summary as of 1/30/2018     INR goal 2.0-3.0   Today's INR 2.1   Full instructions 2 mg on Mon; 4 mg all other days   Next INR check 3/21/2018    Indications   Atrial fibrillation (HCC) [I48.91] [I48.91]  Long-term (current) use of anticoagulants [Z79.01] [Z79.01]         Your next Anticoagulation Clinic appointment(s)     Jan 30, 2018  9:00 AM CST   Anticoagulation Visit with INR MG   [facility] ([facility])    [address] [place] [address] [phone]            Mar 21, 2018 10:40 AM CDT   Anticoagulation Visit with INR MG   [facility])    [address] [place] [address] [phone]              Contact Numbers     [facility]  Please call the anticoagulation nurse at [phone] to cancel and/or reschedule your appointment, or with any problems or questions regarding your therapy.  You may call the main clinic number at [phone] if the nurse is not available.           January 2018 Details    Sun Mon Tue Wed Thu Fri Sat      1               2               3               4               5               6                 7               8               9               10               11               12               13                 14               15               16               17               18               19               20                 21               22               23               24               25               26               27                 28               29               30      4 mg   See details      31      4 mg             Date Details   01/30 This INR check               How to take your warfarin dose     To take:   4 mg Take 2 of the 2 mg tablets.           February 2018 Details    Sun Mon Tue Wed Thu Fri Sat         1      4 mg         2      4 mg         3      4 mg           4      4 mg         5      2 mg         6      4 mg         7      4 mg         8      4 mg         9      4 mg         10      4 mg           11      4 mg         12      2 mg         13      4 mg         14      4 mg         15      4 mg         16      4 mg         17      4 mg           18      4 mg         19      2 mg         20      4 mg         21      4 mg         22      4 mg         23      4 mg         24      4 mg           25      4 mg         26      2 mg         27      4 mg         28      4 mg             Date Details   No additional details            How to take your warfarin dose     To take:  2 mg Take 1 of the 2 mg tablets.    To take:  4 mg Take 2 of the 2 mg tablets.           March 2018 Details    Sun Mon Tue Wed Thu Fri Sat         1      4 mg         2      4 mg         3      4 mg           4      4 mg         5      2 mg         6      4 mg         7      4 mg         8      4 mg         9      4 mg         10      4 mg           11      4 mg         12      2 mg         13      4 mg         14      4 mg         15      4 mg         16      4 mg         17      4 mg           18      4 mg         19      2 mg         20      4 mg         21            22               23               24                 25               26               27               28               29               30               31                Date Details   No additional details    Date of next INR:  3/21/2018         How to take your warfarin dose     To take:  2 mg Take 1 of the 2 mg tablets.    To take:  4 mg Take 2 of the 2 mg tablets.

## 2018-01-30 NOTE — PROGRESS NOTES
ANTICOAGULATION FOLLOW-UP CLINIC VISIT    Patient Name:  Carlton Bravo  Date:  1/30/2018  Contact Type:  Face to Face    SUBJECTIVE:     Patient Findings     Positives No Problem Findings           OBJECTIVE    INR Protime   Date Value Ref Range Status   01/30/2018 2.1 (A) 0.86 - 1.14 Final       ASSESSMENT / PLAN  INR assessment THER    Recheck INR In: 6 WEEKS    INR Location Clinic      Anticoagulation Summary as of 1/30/2018     INR goal 2.0-3.0   Today's INR 2.1   Maintenance plan 2 mg (2 mg x 1) on Mon; 4 mg (2 mg x 2) all other days   Full instructions 2 mg on Mon; 4 mg all other days   Weekly total 26 mg   No change documented Annabelle Almaguer RN   Plan last modified Annabelle Almaguer RN (8/16/2017)   Next INR check 3/21/2018   Priority INR   Target end date     Indications   Atrial fibrillation (HCC) [I48.91] [I48.91]  Long-term (current) use of anticoagulants [Z79.01] [Z79.01]         Anticoagulation Episode Summary     INR check location     Preferred lab     Send INR reminders to MG  INR    Comments possible chromogenic 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

## 2018-02-25 DIAGNOSIS — I48.91 ATRIAL FIBRILLATION (H): ICD-10-CM

## 2018-02-26 RX ORDER — WARFARIN SODIUM 2 MG/1
TABLET ORAL
Qty: 180 TABLET | Refills: 0 | Status: SHIPPED | OUTPATIENT
Start: 2018-02-26 | End: 2018-05-09

## 2018-02-26 NOTE — TELEPHONE ENCOUNTER
warfarin (COUMADIN) 2 MG tablet 180 tablet 0 12/4/2017  No   Sig: TAKE 2 TABS BY MOUTH DAILY 5 DAYS OF THE WEEK & 3 TABS DAILY THE OTHER 2 DAYS OF THE WEEK.     Last OV with Dr. Bentley: 8/2/2017    Refilled per [facility] protocol.    Annabelle Almaguer RN

## 2018-03-21 ENCOUNTER — ANTICOAGULATION THERAPY VISIT (OUTPATIENT)
Dept: PHARMACY | Facility: CLINIC | Age: 78
End: 2018-03-21
Payer: MEDICARE

## 2018-03-21 ENCOUNTER — OFFICE VISIT (OUTPATIENT)
Dept: CARDIOLOGY | Facility: CLINIC | Age: 78
End: 2018-03-21
Payer: MEDICARE

## 2018-03-21 VITALS
BODY MASS INDEX: 27.93 KG/M2 | HEART RATE: 87 BPM | DIASTOLIC BLOOD PRESSURE: 96 MMHG | OXYGEN SATURATION: 98 % | WEIGHT: 217.5 LBS | SYSTOLIC BLOOD PRESSURE: 150 MMHG

## 2018-03-21 DIAGNOSIS — I10 ESSENTIAL HYPERTENSION: ICD-10-CM

## 2018-03-21 DIAGNOSIS — Z79.01 LONG-TERM (CURRENT) USE OF ANTICOAGULANTS: ICD-10-CM

## 2018-03-21 DIAGNOSIS — I48.91 ATRIAL FIBRILLATION (H): ICD-10-CM

## 2018-03-21 DIAGNOSIS — I48.91 ATRIAL FIBRILLATION, UNSPECIFIED TYPE (H): Primary | ICD-10-CM

## 2018-03-21 LAB — INR POINT OF CARE: 3 (ref 0.86–1.14)

## 2018-03-21 PROCEDURE — 99214 OFFICE O/P EST MOD 30 MIN: CPT | Performed by: INTERNAL MEDICINE

## 2018-03-21 PROCEDURE — 85610 PROTHROMBIN TIME: CPT | Mod: QW

## 2018-03-21 PROCEDURE — 99207 ZZC NO CHARGE NURSE ONLY: CPT

## 2018-03-21 PROCEDURE — 36416 COLLJ CAPILLARY BLOOD SPEC: CPT

## 2018-03-21 ASSESSMENT — PAIN SCALES - GENERAL: PAINLEVEL: NO PAIN (0)

## 2018-03-21 NOTE — NURSING NOTE
"Carlton Bravo's goals for this visit include:   Chief Complaint   Patient presents with     RECHECK     6 month follow up       He requests these members of his care team be copied on today's visit information: PCP    PCP: Lona Pettit    Referring Provider:  No referring provider defined for this encounter.    Chief Complaint   Patient presents with     RECHECK     6 month follow up       Initial BP (!) 184/94 (BP Location: Left arm, Patient Position: Chair, Cuff Size: Adult Large)  Pulse 87  Wt 98.7 kg (217 lb 8 oz)  SpO2 98%  BMI 27.93 kg/m2 Estimated body mass index is 27.93 kg/(m^2) as calculated from the following:    Height as of 1/23/17: 1.88 m (6' 2\").    Weight as of this encounter: 98.7 kg (217 lb 8 oz).  Medication Reconciliation: complete         Medication Refills: none        Silva Troncoso CMA        "

## 2018-03-21 NOTE — MR AVS SNAPSHOT
After Visit Summary   3/21/2018    Carlton Bravo    MRN: [id number]           Patient Information     Date Of Birth          1940        Visit Information        Provider Department      3/21/2018 11:00 AM Dakota Bentley MD [facility]        Today's Diagnoses     Atrial fibrillation, unspecified type (H)    -  1    Essential hypertension           Follow-ups after your visit        Additional Services     Follow-Up with General Cardiologist - 1 Year                 Your next 10 appointments already scheduled     May 09, 2018  9:00 AM CDT   [facility] with INR [facility]   [facility]    [id number] [place] [address] [phone]              Future tests that were ordered for you today     Open Future Orders        Priority Expected Expires Ordered    Follow-Up with General Cardiologist - 1 Year Routine 3/21/2019 3/22/2019 3/21/2018            Who to contact     If you have questions or need follow up information about today's clinic visit or your schedule please contact [facility] directly at [phone].  Normal or non-critical lab and imaging results will be communicated to you by Nanoogohart, letter or phone within 4 business days after the clinic has received the results. If you do not hear from us within 7 days, please contact the clinic through Nanoogohart or phone. If you have a critical or abnormal lab result, we will notify you by phone as soon as possible.  Submit refill requests through [employer] Pharmal[employer] or call your pharmacy and they will forward the refill request to us. Please allow 3 business days for your refill to be completed.          Additional Information About Your Visit        NanoogoharOmPrompt Information     [employer] Pharmalab is an electronic gateway that provides easy, online access to your medical records. With [employer] Pharmalab, you can request a clinic appointment, read your test results, renew a  prescription or communicate with your care team.     To sign up for [employer] Inchart visit the website at [email or web address]   You will be asked to enter the access code listed below, as well as some personal information. Please follow the directions to create your username and password.     Your access code is: [email or web address]  Expires: 2018 11:26 AM     Your access code will  in 90 days. If you need help or a new code, please contact your [facility] or call [phone] for assistance.        Care EveryWhere ID     This is your Care EveryWhere ID. This could be used by other organizations to access your [facility] medical records  [id number]        Your Vitals Were     Pulse Pulse Oximetry BMI (Body Mass Index)             87 98% 27.93 kg/m2          Blood Pressure from Last 3 Encounters:   18 (!) 150/96   17 160/90   17 122/72    Weight from Last 3 Encounters:   18 98.7 kg (217 lb 8 oz)   17 104.9 kg (231 lb 3.2 oz)   17 101.2 kg (223 lb)               Primary Care Provider Office Phone # Fax #    Lona Nathalie Pettit, [facility] [phone]       [address]  [place] [address]        Equal Access to Services     BRANDAN LEWIS AH: Hadii nicolette ku hadasho Soomaali, waaxda luqadaha, qaybta kaalmada adedianayada, laila nichols . So [facility] [phone].    ATENCIÓN: Si habla español, tiene a chen disposición servicios gratuitos de asistencia lingüística. Deisi al [phone].    We comply with applicable federal civil rights laws and [place] laws. We do not discriminate on the basis of race, color, national origin, age, disability, sex, sexual orientation, or gender identity.            Thank you!     Thank you for choosing [facility]  for your care. Our goal is always to provide you with excellent care. Hearing back from our patients is one way we can continue to improve our  services. Please take a few minutes to complete the written survey that you may receive in the mail after your visit with us. Thank you!             Your Updated Medication List - Protect others around you: Learn how to safely use, store and throw away your medicines at www.disposemymeds.org.          This list is accurate as of 3/21/18 11:26 AM.  Always use your most recent med list.                   Brand Name Dispense Instructions for use Diagnosis    carvedilol 6.25 MG tablet    COREG    360 tablet    TAKE 2 TABLETS BY MOUTH TWICE A DAY    Idiopathic cardiomyopathy (H), HTN, goal below 140/90       pantoprazole 40 MG EC tablet    PROTONIX    180 tablet    TAKE 1 TABLET BY MOUTH TWICE DAILY    Acute gastric ulcer with hemorrhage       pravastatin 80 MG tablet    PRAVACHOL    90 tablet    TAKE ONE TABLET BY MOUTH ONCE DAILY    Dyslipidemia       warfarin 2 MG tablet    COUMADIN    180 tablet    TAKE 2 TABS BY MOUTH DAILY 5 DAYS OF THE WEEK & 3 TABS DAILY THE OTHER 2 DAYS OF THE WEEK.    Atrial fibrillation (H)

## 2018-03-21 NOTE — MR AVS SNAPSHOT
Carlton Matosdylon   3/21/2018 10:40 AM   Anticoagulation Therapy Visit    Description:  77 year old male   Provider:  INR MG   Department:  Mg Med Monitoring           INR as of 3/21/2018     Today's INR 3.0      Anticoagulation Summary as of 3/21/2018     INR goal 2.0-3.0   Today's INR 3.0   Full instructions 2 mg on Mon; 4 mg all other days   Next INR check 5/9/2018    Indications   Atrial fibrillation (HCC) [I48.91] [I48.91]  Long-term (current) use of anticoagulants [Z79.01] [Z79.01]         Your next Anticoagulation Clinic appointment(s)     May 09, 2018  9:00 AM CDT   Anticoagulation Visit with INR MG   [facility]    [address] [place] [address] [phone]              Contact Numbers     [facility]  Please call the anticoagulation nurse at [phone] to cancel and/or reschedule your appointment, or with any problems or questions regarding your therapy.  You may call the main clinic number at [phone] if the nurse is not available.           March 2018 Details    Sun Mon Tue Wed Thu Fri Sat         1               2               3                 4               5               6               7               8               9               10                 11               12               13               14               15               16               17                 18               19               20               21      4 mg   See details      22      4 mg         23      4 mg         24      4 mg           25      4 mg         26      2 mg         27      4 mg         28      4 mg         29      4 mg         30      4 mg         31      4 mg          Date Details   03/21 This INR check               How to take your warfarin dose     To take:  2 mg Take 1 of the 2 mg tablets.    To take:  4 mg Take 2 of the 2 mg tablets.           April 2018 Details    Sun Mon Tue Wed Thu Fri Sat     1      4 mg         2       2 mg         3      4 mg         4      4 mg         5      4 mg         6      4 mg         7      4 mg           8      4 mg         9      2 mg         10      4 mg         11      4 mg         12      4 mg         13      4 mg         14      4 mg           15      4 mg         16      2 mg         17      4 mg         18      4 mg         19      4 mg         20      4 mg         21      4 mg           22      4 mg         23      2 mg         24      4 mg         25      4 mg         26      4 mg         27      4 mg         28      4 mg           29      4 mg         30      2 mg               Date Details   No additional details            How to take your warfarin dose     To take:  2 mg Take 1 of the 2 mg tablets.    To take:  4 mg Take 2 of the 2 mg tablets.           May 2018 Details    Sun Mon Tue Wed Thu Fri Sat       1      4 mg         2      4 mg         3      4 mg         4      4 mg         5      4 mg           6      4 mg         7      2 mg         8      4 mg         9            10               11               12                 13               14               15               16               17               18               19                 20               21               22               23               24               25               26                 27               28               29               30               31                  Date Details   No additional details    Date of next INR:  5/9/2018         How to take your warfarin dose     To take:  2 mg Take 1 of the 2 mg tablets.    To take:  4 mg Take 2 of the 2 mg tablets.

## 2018-03-21 NOTE — LETTER
3/21/2018      RE: Carlotn Bravo  [id number]  [facility] [id number]       Dear Colleague,    Thank you for the opportunity to participate in the care of your patient, Carlton Bravo, at the [facility]. Please see a copy of my visit note below.    CC- blood pressure is high    HPI- Mr. Bravo is a patient I have seen at the [facility] clinic. He presented in December of 2007 with CHF and was found to have an EF of 25%. Coronary angiography demonstrated normal coronary arteries. He also was in atrial fibrillation. His initial attempt at cardioversion failed but after control of his heart failure sinus rhythm was restored. A follow up echocardiogram in 2009 demonstrated recovery of systolic function with an EF of 50-55%, although the LV was still mildly dilated.    I have not seen Mr. Bravo since June 2, 2011.     He reports he has been well. He simply felt it was time to be seen again. He has no complaints today.    I have reviewed his ECG. It shows atrial fibrillation with a controlled ventricular rate.    [id number]: I have not see Mr. Bravo for over three years. He reports he is here because he needs his meds refilled.     He has not noted atrial fibrillation. No chest pain/discomfort. Fairly sedentary due to knee pain. Ankle swelling at times but not other symptoms of heart failure.     21Mar2018 Interval history: His follow-up echocardiogram (see below) shows normal left ventricular systolic function.  His blood pressure is quite high today.  He reports he checks it at home and the highest he is ever seen it is in the low 140s.  Typically he reports it is in the 110s to 120s.  He reports no palpitations.  He says his functional status is unchanged but admits he is not very active.  He denies any palpitations feelings of near syncope or syncope.  He has not had any chest pain or chest discomfort.   He has not noted any unusual dyspnea on exertion.  He reports his ankle swelling is better.  He does have bad days but for the most part he has not had much in the way of ankle swelling.    Current Outpatient Prescriptions   Medication Sig Dispense Refill     warfarin (COUMADIN) 2 MG tablet TAKE 2 TABS BY MOUTH DAILY 5 DAYS OF THE WEEK & 3 TABS DAILY THE OTHER 2 DAYS OF THE WEEK. 180 tablet 0     pravastatin (PRAVACHOL) 80 MG tablet TAKE ONE TABLET BY MOUTH ONCE DAILY 90 tablet 0     pantoprazole (PROTONIX) 40 MG EC tablet TAKE 1 TABLET BY MOUTH TWICE DAILY 180 tablet 0     carvedilol (COREG) 6.25 MG tablet TAKE 2 TABLETS BY MOUTH TWICE A  tablet 0       No Known Allergies    Past Medical History:   Diagnosis Date     CHF (congestive heart failure) (H)     no symptoms after therapy and recovery of EF     Dyslipidemia     LDL goal <100     Hypertension      Idiopathic cardiomyopathy (H) 12/2007    normal coronaries, EF 25%; f/u echo 2009 EF 50-55%     Paroxysmal atrial fibrillation (H)      Past Surgical History:   Procedure Laterality Date     KNEE SURGERY  1990    bilateral arthroscopic     Social History   Substance Use Topics     Smoking status: Never Smoker     Smokeless tobacco: Never Used     Alcohol use 0.0 oz/week     0 Standard drinks or equivalent per week      Comment: was drinking about 5 drinks a night but quit 7/2015 due to GI bleed.      Family History   Problem Relation Age of Onset     Myocardial Infarction       CEREBROVASCULAR DISEASE Father      Hypertension No family hx of      DIABETES No family hx of      Colon Cancer No family hx of      Prostate Cancer No family hx of      ROS- the ten system review is negative except as noted in the HPI. 21Mar2018/woa    Physical examination:  BP (!) 184/94 (BP Location: Left arm, Patient Position: Chair, Cuff Size: Adult Large)  Pulse 87  Wt 98.7 kg (217 lb 8 oz)  SpO2 98%  BMI 27.93 kg/m2  He reports his BP at home is typically in the  120s    GENERAL APPEARANCE: healthy, alert and no distress  HEENT: no icterus, normal conjunctiva, no cyanosis  NECK: no venous distention  RESPIRATORY: lungs clear to auscultation - no rales, rhonchi or wheezes  CARDIOVASCULAR: irregular, normal S1 S2, no S3 or S4 and no murmur, click or rub, precordium quiet with normal PMI  ABDOMEN: soft, non tender with normal bowel sounds, no masses, no bruits   EXTREMITIES: mild ankle edema  VASC: warm and well perfused  NEURO: alert and oriented to person, place and time, normal gait, affect and speech    Laboratory:    Results for DUSTIN FERRELL (MRN [id number]) as of 3/21/2018 11:02   Ref. Range 2017 00:00 10/25/2017 00:00 2017 00:00 2018 00:00 3/21/2018 00:00   INR Latest Ref Range: 0.86 - 1.14  2.4 (A) 2.5 (A) 2.7 (A) 2.1 (A) 3.0 (A)       [facility]  Echocardiography Laboratory  [address] [place] [address]        Name: DUSTIN FERRELL  MRN: [id number]  : 1940  Study Date: 2017 09:34 AM  Age: 77 yrs  Gender: Male  Patient Location: [facility]  Reason For Study: , Unspecified atrial fibrillation, Essential (primary)  hypertension  Ordering Physician: JESÚS JERRY  Referring Physician: JESÚS JERRY  Performed By: Boone Matthews RDCS     BSA: 2.3 m2  Height: 74 in  Weight: 231 lb  HR: 93  BP: 161/99 mmHg  _____________________________________________________________________________  __        Procedure  Echocardiogram with two-dimensional, color and spectral Doppler performed.  _____________________________________________________________________________  __        Interpretation Summary     Patient's rhythm is atrial fibrillation.  Global and regional left ventricular function is normal with an EF of 55-60%.  Biplane traced at 59%.  Global right ventricular function is normal. Mild right ventricular dilation  is present.  Both atria are moderate to severely enlarged.  Mild to moderate mitral and  tricuspid insufficiency is present.  The inferior vena cava was normal in size with preserved respiratory  variability. Estimated mean right atrial pressure is 3 mmHg.  This study was compared with the study from 7/10/2014.  With direct comparison of the images, there has been no significant change.  _____________________________________________________________________________  __        Left Ventricle  Left ventricular wall thickness is normal. Left ventricular size is normal.  Global and regional left ventricular function is normal with an EF of 55-60%.  Biplane traced at 59%. Diastolic function not assessed due to atrial  fibrillation.     Right Ventricle  Global right ventricular function is normal. Mild right ventricular dilation  is present.     Atria  Moderate left atrial enlargement is present. Moderate to severe right atrial  enlargement is present.     Mitral Valve  The mitral valve is normal. Mild to moderate mitral insufficiency is present.        Aortic Valve  Mild aortic valve sclerosis is present. The aortic valve is tricuspid. Trace  aortic insufficiency is present.     Tricuspid Valve  Mild to moderate tricuspid insufficiency is present. The right ventricular  systolic pressure is approximated at 35.1 mmHg plus the right atrial pressure.     Pulmonic Valve  The pulmonic valve is normal. Trace pulmonic insufficiency is present.     Vessels  The aorta root is normal. The inferior vena cava was normal in size with  preserved respiratory variability. Estimated mean right atrial pressure is 3  mmHg.     Pericardium  Trivial pericardial effusion is present.        Compared to Previous Study  This study was compared with the study from 7/10/2014 . With direct comparison  of the images, there has been no significant change.  _____________________________________________________________________________  __  MMode/2D Measurements & Calculations  IVSd: 0.80 cm     LVIDd: 5.9 cm  LVIDs: 3.8 cm  LVPWd: 1.0  cm  FS: 35.6 %  EDV(Teich): 176.4 ml  ESV(Teich): 63.1 ml  LV mass(C)d: 214.9 grams  LV mass(C)dI: 93.0 grams/m2  Ao root diam: 3.8 cm  LA dimension: 4.0 cm  asc Aorta Diam: 3.9 cm  LA/Ao: 1.1  LVOT diam: 2.6 cm  LVOT area: 5.3 cm2  LA Volume (BP): 110.0 ml  LA Volume Index (BP): 47.6 ml/m2           Doppler Measurements & Calculations  MV E max erasmo: 78.4 cm/sec  MV dec time: 0.18 sec  Ao V2 max: 113.5 cm/sec  Ao max P.0 mmHg  RJ(V,D): 4.1 cm2  LV V1 max PG: 3.1 mmHg  LV V1 max: 87.8 cm/sec  LV V1 VTI: 17.2 cm  MR ERO: 0.34 cm2  MR volume: 56.5 ml  CO(LVOT): 6.2 l/min  CI(LVOT): 2.7 l/min/m2  SV(LVOT): 91.6 ml  SI(LVOT): 39.7 ml/m2  PA V2 max: 77.2 cm/sec  PA max P.4 mmHg  PA acc time: 0.10 sec  PI end-d erasmo: 117.1 cm/sec  PI max erasmo: 182.3 cm/sec  PI max P.3 mmHg  TR max erasmo: 296.3 cm/sec  TR max P.1 mmHg  Pulm Sys Erasmo: 34.9 cm/sec  Pulm Cabezas Erasmo: 51.8 cm/sec  Pulm S/D: 0.67  Lateral E/e': 5.2  Medial E/e': 5.8              _____________________________________________________________________________  __        Report approved by: Memo Beatty 2017 06:21 PM    Previous studies reviewed today:    Interpretation Summary  Mild to Moderate left ventricular dilation is present. The Ejection Fraction   is estimated at 45-50%. Global right ventricular function is normal.   Pulmonary artery systolic pressure is normal. The inferior vena cava  is   normal. Ascending aorta 4.1 cm. No pericardial effusion is present.  PatientHeight: 74 in  PatientWeight: 227 lbs  SystolicPressure: 158 mmHg  DiastolicPressure: 98 mmHg  BSA 2.3 m^2        Procedure  Echocardiogram with two-dimensional, color and spectral Doppler performed.  Contrast Definity.  Patient was given 2.0ml mixture of 1.5ml Definity and 8.5ml saline.  IV start location LAC .     Left Ventricle  Mild to Moderate left ventricular dilation is present.  The Ejection Fraction is estimated at 45-50%.  Mild diffuse hypokinesis is  present.     Right Ventricle  The right ventricle is normal size.  Global right ventricular function is normal.     Atria  Moderate to severe biatrial enlargement is present.     Mitral Valve  The mitral valve is normal.  Mild mitral insufficiency is present.     Aortic Valve  Mild aortic valve sclerosis is present.     Tricuspid Valve  The tricuspid valve is normal.  Mild tricuspid insufficiency is present.  Pulmonary artery systolic pressure is normal.     Pulmonic Valve  The pulmonic valve is normal.  Trace pulmonic insufficiency is present.     Vessels  The inferior vena cava  is normal.  Ascending aorta 4.1 cm.     Pericardium  No pericardial effusion is present.     MMode 2D Measurements & Calculations  IVSd: 1.2 cm  LVIDd: 5.8 cm  LVIDs: 4.0 cm  LVPWd: 1.3 cm  FS: 31 %  LV mass(C)d: 302 grams  Ao root diam: 3.8 cm  LA dimension: 4.6 cm  LA/Ao: 1.2   LVOT diam: 2.4 cm  EDV(MOD-bp): 0.00 ml  ESV(MOD-bp): 0.00 ml  Doppler Measurements & Calculations  MV V2 VTI: 4.6 cm  MV dec time: 0.18 sec  MR ERO: 0.07 cm^2  MR volume: 12 ml  TR Max gene: 238 cm/sec  TR Max P mmHg     Interpreting Physician:  JULIAN Mendoza MD electronically signed on 07-     Assessment and recommendations:    1) Hypertension - He reports that at home his BP is under much better control. He typically gets readings in the 120s. A repeat manual BP check after 10 minutes of rest was 160/90.     -I discussed 24 hour blood pressure monitor given that he appears to have whitecoat hypertension.  He says he bought a new blood pressure monitor to use at home and it agrees us closely with his old one.  He is not interested in wearing a blood pressure monitor at this time.  Nor does he feel like he needs to be on increased therapy for his blood pressure given his home readings ranging from 110s to the 140s.    2) Idiopathic cardiomyopathy - he reports no dyspnea on exertion or orthopnea, he feels limited by pain in his knees.     - echocardiogram  shows normal systolic function    3) Atrial fibrillation -at this point he may have permanent atrial fibrillation.  At this point it is difficult to tell if he is having episodes of sinus rhythm.  We discussed a Holter monitor to try to determine both whether he remains in atrial fibrillation throughout the day, as well as to evaluate his heart rate control.  He declines a monitor at this time.  His echo demonstrated moderate to severe atrial enlargement suggesting he is in atrial fibrillation a good portion of the time.    I appreciate the chance to help with Mr. Bravo's care.     Portions of this note were dictated using speech recognition software. The note has been proofread but errors in the text may have been overlooked. Please contact me if there are any concerns regarding the accuracy of the dictation.       Please do not hesitate to contact me if you have any questions/concerns.     Sincerely,     Dakota Bentley MD

## 2018-03-21 NOTE — PROGRESS NOTES
ANTICOAGULATION FOLLOW-UP CLINIC VISIT    Patient Name:  Carlton Bravo  Date:  3/21/2018  Contact Type:  Face to Face    SUBJECTIVE:     Patient Findings     Positives No Problem Findings           OBJECTIVE    INR Protime   Date Value Ref Range Status   03/21/2018 3.0 (A) 0.86 - 1.14 Final       ASSESSMENT / PLAN  INR assessment THER    Recheck INR In: 6 WEEKS    INR Location Clinic      Anticoagulation Summary as of 3/21/2018     INR goal 2.0-3.0   Today's INR 3.0   Maintenance plan 2 mg (2 mg x 1) on Mon; 4 mg (2 mg x 2) all other days   Full instructions 2 mg on Mon; 4 mg all other days   Weekly total 26 mg   No change documented Annabelle Almaguer RN   Plan last modified Annabelle Almaguer RN (8/16/2017)   Next INR check 5/9/2018   Priority INR   Target end date     Indications   Atrial fibrillation (HCC) [I48.91] [I48.91]  Long-term (current) use of anticoagulants [Z79.01] [Z79.01]         Anticoagulation Episode Summary     INR check location     Preferred lab     Send INR reminders to MG  INR    Comments possible chromogenic 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

## 2018-03-21 NOTE — PROGRESS NOTES
CC- blood pressure is high    HPI- Mr. Bravo is a patient I have seen at the [facility] clinic. He presented in December of 2007 with CHF and was found to have an EF of 25%. Coronary angiography demonstrated normal coronary arteries. He also was in atrial fibrillation. His initial attempt at cardioversion failed but after control of his heart failure sinus rhythm was restored. A follow up echocardiogram in 2009 demonstrated recovery of systolic function with an EF of 50-55%, although the LV was still mildly dilated.    I have not seen Mr. Bravo since June 2, 2011.     He reports he has been well. He simply felt it was time to be seen again. He has no complaints today.    I have reviewed his ECG. It shows atrial fibrillation with a controlled ventricular rate.    [id number]: I have not see Mr. Bravo for over three years. He reports he is here because he needs his meds refilled.     He has not noted atrial fibrillation. No chest pain/discomfort. Fairly sedentary due to knee pain. Ankle swelling at times but not other symptoms of heart failure.     [id number] Interval history: His follow-up echocardiogram (see below) shows normal left ventricular systolic function.  His blood pressure is quite high today.  He reports he checks it at home and the highest he is ever seen it is in the low 140s.  Typically he reports it is in the 110s to 120s.  He reports no palpitations.  He says his functional status is unchanged but admits he is not very active.  He denies any palpitations feelings of near syncope or syncope.  He has not had any chest pain or chest discomfort.  He has not noted any unusual dyspnea on exertion.  He reports his ankle swelling is better.  He does have bad days but for the most part he has not had much in the way of ankle swelling.    Current Outpatient Prescriptions   Medication Sig Dispense Refill     warfarin (COUMADIN) 2 MG tablet TAKE 2 TABS BY MOUTH DAILY 5 DAYS OF THE  WEEK & 3 TABS DAILY THE OTHER 2 DAYS OF THE WEEK. 180 tablet 0     pravastatin (PRAVACHOL) 80 MG tablet TAKE ONE TABLET BY MOUTH ONCE DAILY 90 tablet 0     pantoprazole (PROTONIX) 40 MG EC tablet TAKE 1 TABLET BY MOUTH TWICE DAILY 180 tablet 0     carvedilol (COREG) 6.25 MG tablet TAKE 2 TABLETS BY MOUTH TWICE A  tablet 0       No Known Allergies    Past Medical History:   Diagnosis Date     CHF (congestive heart failure) (H)     no symptoms after therapy and recovery of EF     Dyslipidemia     LDL goal <100     Hypertension      Idiopathic cardiomyopathy (H) 12/2007    normal coronaries, EF 25%; f/u echo 2009 EF 50-55%     Paroxysmal atrial fibrillation (H)      Past Surgical History:   Procedure Laterality Date     KNEE SURGERY  1990    bilateral arthroscopic     Social History   Substance Use Topics     Smoking status: Never Smoker     Smokeless tobacco: Never Used     Alcohol use 0.0 oz/week     0 Standard drinks or equivalent per week      Comment: was drinking about 5 drinks a night but quit 7/2015 due to GI bleed.      Family History   Problem Relation Age of Onset     Myocardial Infarction       CEREBROVASCULAR DISEASE Father      Hypertension No family hx of      DIABETES No family hx of      Colon Cancer No family hx of      Prostate Cancer No family hx of      ROS- the ten system review is negative except as noted in the HPI. [id number]/woa    Physical examination:  BP (!) 184/94 (BP Location: Left arm, Patient Position: Chair, Cuff Size: Adult Large)  Pulse 87  Wt 98.7 kg (217 lb 8 oz)  SpO2 98%  BMI 27.93 kg/m2  He reports his BP at home is typically in the 120s    GENERAL APPEARANCE: healthy, alert and no distress  HEENT: no icterus, normal conjunctiva, no cyanosis  NECK: no venous distention  RESPIRATORY: lungs clear to auscultation - no rales, rhonchi or wheezes  CARDIOVASCULAR: irregular, normal S1 S2, no S3 or S4 and no murmur, click or rub, precordium quiet with normal PMI  ABDOMEN:  soft, non tender with normal bowel sounds, no masses, no bruits   EXTREMITIES: mild ankle edema  VASC: warm and well perfused  NEURO: alert and oriented to person, place and time, normal gait, affect and speech    Laboratory:    Results for DUSTIN FERRELL (MRN [id number]) as of 3/21/2018 11:02   Ref. Range 2017 00:00 10/25/2017 00:00 2017 00:00 2018 00:00 3/21/2018 00:00   INR Latest Ref Range: 0.86 - 1.14  2.4 (A) 2.5 (A) 2.7 (A) 2.1 (A) 3.0 (A)       [facility]  Echocardiography Laboratory  [address] [place] [address]        Name: DUSTIN FERRELL  MRN: [id number]  : 1940  Study Date: 2017 09:34 AM  Age: 77 yrs  Gender: Male  Patient Location: [facility]  Reason For Study: , Unspecified atrial fibrillation, Essential (primary)  hypertension  Ordering Physician: JESÚS JERRY  Referring Physician: JESÚS JERRY  Performed By: Boone Matthews RDCS     BSA: 2.3 m2  Height: 74 in  Weight: 231 lb  HR: 93  BP: 161/99 mmHg  _____________________________________________________________________________  __        Procedure  Echocardiogram with two-dimensional, color and spectral Doppler performed.  _____________________________________________________________________________  __        Interpretation Summary     Patient's rhythm is atrial fibrillation.  Global and regional left ventricular function is normal with an EF of 55-60%.  Biplane traced at 59%.  Global right ventricular function is normal. Mild right ventricular dilation  is present.  Both atria are moderate to severely enlarged.  Mild to moderate mitral and tricuspid insufficiency is present.  The inferior vena cava was normal in size with preserved respiratory  variability. Estimated mean right atrial pressure is 3 mmHg.  This study was compared with the study from 7/10/2014.  With direct comparison of the images, there has been no significant  change.  _____________________________________________________________________________  __        Left Ventricle  Left ventricular wall thickness is normal. Left ventricular size is normal.  Global and regional left ventricular function is normal with an EF of 55-60%.  Biplane traced at 59%. Diastolic function not assessed due to atrial  fibrillation.     Right Ventricle  Global right ventricular function is normal. Mild right ventricular dilation  is present.     Atria  Moderate left atrial enlargement is present. Moderate to severe right atrial  enlargement is present.     Mitral Valve  The mitral valve is normal. Mild to moderate mitral insufficiency is present.        Aortic Valve  Mild aortic valve sclerosis is present. The aortic valve is tricuspid. Trace  aortic insufficiency is present.     Tricuspid Valve  Mild to moderate tricuspid insufficiency is present. The right ventricular  systolic pressure is approximated at 35.1 mmHg plus the right atrial pressure.     Pulmonic Valve  The pulmonic valve is normal. Trace pulmonic insufficiency is present.     Vessels  The aorta root is normal. The inferior vena cava was normal in size with  preserved respiratory variability. Estimated mean right atrial pressure is 3  mmHg.     Pericardium  Trivial pericardial effusion is present.        Compared to Previous Study  This study was compared with the study from 7/10/2014 . With direct comparison  of the images, there has been no significant change.  _____________________________________________________________________________  __  MMode/2D Measurements & Calculations  IVSd: 0.80 cm     LVIDd: 5.9 cm  LVIDs: 3.8 cm  LVPWd: 1.0 cm  FS: 35.6 %  EDV(Teich): 176.4 ml  ESV(Teich): 63.1 ml  LV mass(C)d: 214.9 grams  LV mass(C)dI: 93.0 grams/m2  Ao root diam: 3.8 cm  LA dimension: 4.0 cm  asc Aorta Diam: 3.9 cm  LA/Ao: 1.1  LVOT diam: 2.6 cm  LVOT area: 5.3 cm2  LA Volume (BP): 110.0 ml  LA Volume Index (BP): 47.6  ml/m2           Doppler Measurements & Calculations  MV E max erasmo: 78.4 cm/sec  MV dec time: 0.18 sec  Ao V2 max: 113.5 cm/sec  Ao max P.0 mmHg  RJ(V,D): 4.1 cm2  LV V1 max PG: 3.1 mmHg  LV V1 max: 87.8 cm/sec  LV V1 VTI: 17.2 cm  MR ERO: 0.34 cm2  MR volume: 56.5 ml  CO(LVOT): 6.2 l/min  CI(LVOT): 2.7 l/min/m2  SV(LVOT): 91.6 ml  SI(LVOT): 39.7 ml/m2  PA V2 max: 77.2 cm/sec  PA max P.4 mmHg  PA acc time: 0.10 sec  PI end-d erasmo: 117.1 cm/sec  PI max erasmo: 182.3 cm/sec  PI max P.3 mmHg  TR max erasmo: 296.3 cm/sec  TR max P.1 mmHg  Pulm Sys Erasmo: 34.9 cm/sec  Pulm Cabezas Erasmo: 51.8 cm/sec  Pulm S/D: 0.67  Lateral E/e': 5.2  Medial E/e': 5.8              _____________________________________________________________________________  __        Report approved by: Memo Beatty 2017 06:21 PM    Previous studies reviewed today:    Interpretation Summary  Mild to Moderate left ventricular dilation is present. The Ejection Fraction   is estimated at 45-50%. Global right ventricular function is normal.   Pulmonary artery systolic pressure is normal. The inferior vena cava  is   normal. Ascending aorta 4.1 cm. No pericardial effusion is present.  PatientHeight: 74 in  PatientWeight: 227 lbs  SystolicPressure: 158 mmHg  DiastolicPressure: 98 mmHg  BSA 2.3 m^2        Procedure  Echocardiogram with two-dimensional, color and spectral Doppler performed.  Contrast Definity.  Patient was given 2.0ml mixture of 1.5ml Definity and 8.5ml saline.  IV start location LAC .     Left Ventricle  Mild to Moderate left ventricular dilation is present.  The Ejection Fraction is estimated at 45-50%.  Mild diffuse hypokinesis is present.     Right Ventricle  The right ventricle is normal size.  Global right ventricular function is normal.     Atria  Moderate to severe biatrial enlargement is present.     Mitral Valve  The mitral valve is normal.  Mild mitral insufficiency is present.     Aortic Valve  Mild aortic valve  sclerosis is present.     Tricuspid Valve  The tricuspid valve is normal.  Mild tricuspid insufficiency is present.  Pulmonary artery systolic pressure is normal.     Pulmonic Valve  The pulmonic valve is normal.  Trace pulmonic insufficiency is present.     Vessels  The inferior vena cava  is normal.  Ascending aorta 4.1 cm.     Pericardium  No pericardial effusion is present.     MMode 2D Measurements & Calculations  IVSd: 1.2 cm  LVIDd: 5.8 cm  LVIDs: 4.0 cm  LVPWd: 1.3 cm  FS: 31 %  LV mass(C)d: 302 grams  Ao root diam: 3.8 cm  LA dimension: 4.6 cm  LA/Ao: 1.2   LVOT diam: 2.4 cm  EDV(MOD-bp): 0.00 ml  ESV(MOD-bp): 0.00 ml  Doppler Measurements & Calculations  MV V2 VTI: 4.6 cm  MV dec time: 0.18 sec  MR ERO: 0.07 cm^2  MR volume: 12 ml  TR Max gene: 238 cm/sec  TR Max P mmHg     Interpreting Physician:  JULIAN Mendoza MD electronically signed on 07-     Assessment and recommendations:    1) Hypertension - He reports that at home his BP is under much better control. He typically gets readings in the 120s. A repeat manual BP check after 10 minutes of rest was 160/90.     -I discussed 24 hour blood pressure monitor given that he appears to have whitecoat hypertension.  He says he bought a new blood pressure monitor to use at home and it agrees us closely with his old one.  He is not interested in wearing a blood pressure monitor at this time.  Nor does he feel like he needs to be on increased therapy for his blood pressure given his home readings ranging from 110s to the 140s.    2) Idiopathic cardiomyopathy - he reports no dyspnea on exertion or orthopnea, he feels limited by pain in his knees.     - echocardiogram shows normal systolic function    3) Atrial fibrillation -at this point he may have permanent atrial fibrillation.  At this point it is difficult to tell if he is having episodes of sinus rhythm.  We discussed a Holter monitor to try to determine both whether he remains in atrial fibrillation  throughout the day, as well as to evaluate his heart rate control.  He declines a monitor at this time.  His echo demonstrated moderate to severe atrial enlargement suggesting he is in atrial fibrillation a good portion of the time.    I appreciate the chance to help with Mr. Bravo's care.     Portions of this note were dictated using speech recognition software. The note has been proofread but errors in the text may have been overlooked. Please contact me if there are any concerns regarding the accuracy of the dictation.

## 2018-04-02 DIAGNOSIS — I42.9 IDIOPATHIC CARDIOMYOPATHY (H): ICD-10-CM

## 2018-04-02 DIAGNOSIS — I10 HTN, GOAL BELOW 140/90: ICD-10-CM

## 2018-04-02 NOTE — LETTER
April 3, 2018      Carlton Bravo  [address]  [facility] [address]              Dear Carlton,      We recently received a call from your pharmacy requesting a refill of your medication. We have provided a 30 day refill of your medication, carvedilol (COREG) 6.25 MG tablet, as requested.      A review of your chart indicates that your last office visit was on 1/23/2017 with Lona Pettit CNP.  An appointment is required yearly with your provider.    We have authorized a one time refill of your medication to allow time for you to schedule.     If you have a history of diabetes or high cholesterol, please come in fasting for the appointment. Fasting entails nothing to eat or drink 8 hours prior to your appointment; with the exception on water. You may take your medication the day of the appointment.    Please call the clinic to schedule your appointment.    Thank you for taking an active role in your healthcare.      Sincerely,    [facility]

## 2018-04-03 RX ORDER — CARVEDILOL 6.25 MG/1
TABLET ORAL
Qty: 120 TABLET | Refills: 0 | Status: SHIPPED | OUTPATIENT
Start: 2018-04-03 | End: 2018-05-02

## 2018-04-03 NOTE — TELEPHONE ENCOUNTER
carvedilol (COREG) 6.25 MG tablet 360 tablet 0 12/4/2017  No   Sig: TAKE 2 TABLETS BY MOUTH TWICE A DAY     Last OV with Lona Pettit CNP: 1/23/2017    No future apts.     BP Readings from Last 3 Encounters:   03/21/18 (!) 150/96   08/02/17 160/90   01/23/17 122/72     Beta-Blockers Protocol Failed4/2 10:08 AM   Blood pressure under 140/90 in past 12 months    Recent (12 mo) or future (30 days) visit within the authorizing provider's specialty    Patient is age 6 or older     Patient due for annual OV. Flavia sternill. Patient notified by letter.

## 2018-04-08 DIAGNOSIS — E78.5 DYSLIPIDEMIA: ICD-10-CM

## 2018-04-08 NOTE — LETTER
April 9, 2018      Carlton Bravo  [address]  [facility] [address]              Dear Carlton,      We recently received a call from your pharmacy requesting a refill of your medication. We have provided a 30 day refill of your medication, pravastatin (PRAVACHOL) 80 MG tablet, as requested.      A review of your chart indicates that your last office visit was on 1/23/2017.  An appointment is required yearly with your provider.    We have authorized a one time refill of your medication to allow time for you to schedule.     If you have a history of diabetes or high cholesterol, please come in fasting for the appointment. Fasting entails nothing to eat or drink 8 hours prior to your appointment; with the exception on water. You may take your medication the day of the appointment.    Please call the clinic to schedule your appointment.    Thank you for taking an active role in your healthcare.      Sincerely,    [facility]

## 2018-04-09 RX ORDER — PRAVASTATIN SODIUM 80 MG/1
TABLET ORAL
Qty: 90 TABLET | Refills: 0 | Status: SHIPPED | OUTPATIENT
Start: 2018-04-09 | End: 2018-07-29

## 2018-04-09 NOTE — TELEPHONE ENCOUNTER
pravastatin (PRAVACHOL) 80 MG tablet 90 tablet 0 1/5/2018  No   Sig: TAKE ONE TABLET BY MOUTH ONCE DAILY     Last OV with Lona Pettit, CNP: 1/23/2017    No future apts.     LDL Cholesterol Calculated   Date Value Ref Range Status   03/06/2017 79 <100 mg/dL Final     Comment:     Desirable:       <100 mg/dl     Creatinine   Date Value Ref Range Status   03/06/2017 0.92 0.66 - 1.25 mg/dL Final     Statins Protocol Failed4/8 2:25 PM   LDL on file in past 12 months    Recent (12 mo) or future (30 days) visit within the authorizing provider's specialty    No abnormal creatine kinase in past 12 months    Patient is age 18 or older     Patient due for annual OV/labs. Flavia refill. Patient notified by letter. Annabelle Almaguer RN

## 2018-04-13 ENCOUNTER — TELEPHONE (OUTPATIENT)
Dept: PEDIATRICS | Facility: CLINIC | Age: 78
End: 2018-04-13

## 2018-04-13 DIAGNOSIS — Z79.01 LONG-TERM (CURRENT) USE OF ANTICOAGULANTS: ICD-10-CM

## 2018-04-13 DIAGNOSIS — I10 HTN, GOAL BELOW 140/90: Primary | ICD-10-CM

## 2018-04-13 DIAGNOSIS — E78.5 DYSLIPIDEMIA: ICD-10-CM

## 2018-04-13 DIAGNOSIS — I48.91 ATRIAL FIBRILLATION, UNSPECIFIED TYPE (H): ICD-10-CM

## 2018-04-13 NOTE — TELEPHONE ENCOUNTER
Message routed to Dania Pettit to review.  Do you want patient to be seen for a medicare physical?  Last physical 10/25/2015    Lucrecia Oliver CMA

## 2018-04-13 NOTE — TELEPHONE ENCOUNTER
[facility] Call Center    Phone Message    May a detailed message be left on voicemail: yes    Reason for Call: Other: patient didn't understand why he would need to come see a physician when he sees his cardiologist and Tia for INR.  He did schedule a f/u with dr mueller on 5/9 after an INR check because he wanted to do them the same day- and not make a separate visit.  He is scheduled- FYI     Action Taken: Message routed to:  Primary Care p [phone]

## 2018-04-16 NOTE — TELEPHONE ENCOUNTER
That is fine for a follow up appointment but look like is due for fasting labs so placed the orders in lab for him and he could do that on same day if he wants

## 2018-04-16 NOTE — TELEPHONE ENCOUNTER
Patient has a follow up appt with Dr Ross on 05/09/18.  I advised patient per Dania Pettit he is also due for fasting labs.  Patient states he will see INR at 9 am and then go to lab around 9:10 am and then see Dr. Ross at 9:30 am.  Patient stated understanding.  Patient will be fasting.    Lucrecia Oliver CMA

## 2018-04-28 DIAGNOSIS — K25.0 ACUTE GASTRIC ULCER WITH HEMORRHAGE: ICD-10-CM

## 2018-05-01 RX ORDER — PANTOPRAZOLE SODIUM 40 MG/1
TABLET, DELAYED RELEASE ORAL
Qty: 180 TABLET | Refills: 0 | Status: SHIPPED | OUTPATIENT
Start: 2018-05-01 | End: 2018-11-02

## 2018-05-01 NOTE — TELEPHONE ENCOUNTER
pantoprazole (PROTONIX) 40 MG EC tablet 180 tablet 0 12/26/2017  No   Sig: TAKE 1 TABLET BY MOUTH TWICE DAILY     Patient has a follow up appt with Dr Ross on 05/09/18.  I advised patient per Dania Pettit he is also due for fasting labs.  Patient states he will see INR at 9 am and then go to lab around 9:10 am and then see Dr. Ross at 9:30 am.  Patient stated understanding.  Patient will be fasting.    PPI Protocol Passed4/28 9:30 AM   Not on Clopidogrel (unless Pantoprazole ordered)    No diagnosis of osteoporosis on record    Recent (12 mo) or future (30 days) visit within the authorizing provider's specialty    Patient is age 18 or older     Refilled per [facility] protocol.    Annabelle Almaguer RN

## 2018-05-02 DIAGNOSIS — I10 HTN, GOAL BELOW 140/90: ICD-10-CM

## 2018-05-02 DIAGNOSIS — I42.9 IDIOPATHIC CARDIOMYOPATHY (H): ICD-10-CM

## 2018-05-03 RX ORDER — CARVEDILOL 6.25 MG/1
TABLET ORAL
Qty: 120 TABLET | Refills: 6 | Status: SHIPPED | OUTPATIENT
Start: 2018-05-03 | End: 2018-11-13

## 2018-05-03 NOTE — TELEPHONE ENCOUNTER
Pending Prescriptions:                       Disp   Refills    carvedilol (COREG) 6.25 MG tablet [Pharma*120 ta*0            Sig: TAKE 2 TABLETS BY MOUTH TWICE A DAY    Hao refill given last month. Patient has upcoming appointment on 5/9/18 with Dr. Ross. Please advise on second hao refill.    Madeline Brown RN   .[facility], Primary Care

## 2018-05-09 ENCOUNTER — OFFICE VISIT (OUTPATIENT)
Dept: PEDIATRICS | Facility: CLINIC | Age: 78
End: 2018-05-09
Payer: MEDICARE

## 2018-05-09 ENCOUNTER — ANTICOAGULATION THERAPY VISIT (OUTPATIENT)
Dept: PHARMACY | Facility: CLINIC | Age: 78
End: 2018-05-09
Payer: MEDICARE

## 2018-05-09 VITALS
TEMPERATURE: 97 F | WEIGHT: 218.3 LBS | BODY MASS INDEX: 28.03 KG/M2 | DIASTOLIC BLOOD PRESSURE: 84 MMHG | SYSTOLIC BLOOD PRESSURE: 126 MMHG | OXYGEN SATURATION: 97 % | HEART RATE: 89 BPM

## 2018-05-09 DIAGNOSIS — Z76.0 ENCOUNTER FOR MEDICATION REFILL: Primary | ICD-10-CM

## 2018-05-09 DIAGNOSIS — Z79.01 LONG-TERM (CURRENT) USE OF ANTICOAGULANTS: ICD-10-CM

## 2018-05-09 DIAGNOSIS — R73.09 ELEVATED GLUCOSE: Primary | ICD-10-CM

## 2018-05-09 DIAGNOSIS — I10 HTN, GOAL BELOW 140/90: ICD-10-CM

## 2018-05-09 DIAGNOSIS — I48.91 ATRIAL FIBRILLATION, UNSPECIFIED TYPE (H): ICD-10-CM

## 2018-05-09 DIAGNOSIS — I48.91 ATRIAL FIBRILLATION (H): ICD-10-CM

## 2018-05-09 DIAGNOSIS — R80.9 MICROALBUMINURIA: ICD-10-CM

## 2018-05-09 DIAGNOSIS — R26.9 GAIT DISTURBANCE: ICD-10-CM

## 2018-05-09 DIAGNOSIS — E78.5 DYSLIPIDEMIA: ICD-10-CM

## 2018-05-09 LAB
ALBUMIN SERPL-MCNC: 3.3 G/DL (ref 3.4–5)
ALP SERPL-CCNC: 86 U/L (ref 40–150)
ALT SERPL W P-5'-P-CCNC: 33 U/L (ref 0–70)
ANION GAP SERPL CALCULATED.3IONS-SCNC: 10 MMOL/L (ref 3–14)
AST SERPL W P-5'-P-CCNC: 39 U/L (ref 0–45)
BILIRUB SERPL-MCNC: 1 MG/DL (ref 0.2–1.3)
BUN SERPL-MCNC: 8 MG/DL (ref 7–30)
CALCIUM SERPL-MCNC: 8.5 MG/DL (ref 8.5–10.1)
CHLORIDE SERPL-SCNC: 107 MMOL/L (ref 94–109)
CHOLEST SERPL-MCNC: 166 MG/DL
CO2 SERPL-SCNC: 27 MMOL/L (ref 20–32)
CREAT SERPL-MCNC: 0.81 MG/DL (ref 0.66–1.25)
CREAT UR-MCNC: 135 MG/DL
ERYTHROCYTE [DISTWIDTH] IN BLOOD BY AUTOMATED COUNT: 21.2 % (ref 10–15)
GFR SERPL CREATININE-BSD FRML MDRD: >90 ML/MIN/1.7M2
GLUCOSE SERPL-MCNC: 118 MG/DL (ref 70–99)
HBA1C MFR BLD: 5.6 % (ref 0–5.6)
HCT VFR BLD AUTO: 39.2 % (ref 40–53)
HDLC SERPL-MCNC: 94 MG/DL
HGB BLD-MCNC: 12.1 G/DL (ref 13.3–17.7)
INR POINT OF CARE: 2.5 (ref 0.86–1.14)
LDLC SERPL CALC-MCNC: 56 MG/DL
MCH RBC QN AUTO: 25.7 PG (ref 26.5–33)
MCHC RBC AUTO-ENTMCNC: 30.9 G/DL (ref 31.5–36.5)
MCV RBC AUTO: 83 FL (ref 78–100)
MICROALBUMIN UR-MCNC: 561 MG/L
MICROALBUMIN/CREAT UR: 415.56 MG/G CR (ref 0–17)
NONHDLC SERPL-MCNC: 72 MG/DL
PLATELET # BLD AUTO: 250 10E9/L (ref 150–450)
POTASSIUM SERPL-SCNC: 3.5 MMOL/L (ref 3.4–5.3)
PROT SERPL-MCNC: 7.1 G/DL (ref 6.8–8.8)
RBC # BLD AUTO: 4.7 10E12/L (ref 4.4–5.9)
SODIUM SERPL-SCNC: 144 MMOL/L (ref 133–144)
TRIGL SERPL-MCNC: 80 MG/DL
WBC # BLD AUTO: 5.9 10E9/L (ref 4–11)

## 2018-05-09 PROCEDURE — 83036 HEMOGLOBIN GLYCOSYLATED A1C: CPT | Performed by: NURSE PRACTITIONER

## 2018-05-09 PROCEDURE — 80061 LIPID PANEL: CPT | Performed by: NURSE PRACTITIONER

## 2018-05-09 PROCEDURE — 85610 PROTHROMBIN TIME: CPT | Mod: QW

## 2018-05-09 PROCEDURE — 99213 OFFICE O/P EST LOW 20 MIN: CPT | Performed by: FAMILY MEDICINE

## 2018-05-09 PROCEDURE — 82043 UR ALBUMIN QUANTITATIVE: CPT | Performed by: NURSE PRACTITIONER

## 2018-05-09 PROCEDURE — 80053 COMPREHEN METABOLIC PANEL: CPT | Performed by: NURSE PRACTITIONER

## 2018-05-09 PROCEDURE — 99207 ZZC NO CHARGE NURSE ONLY: CPT

## 2018-05-09 PROCEDURE — 36416 COLLJ CAPILLARY BLOOD SPEC: CPT

## 2018-05-09 PROCEDURE — 85027 COMPLETE CBC AUTOMATED: CPT | Performed by: NURSE PRACTITIONER

## 2018-05-09 RX ORDER — WARFARIN SODIUM 2 MG/1
TABLET ORAL
Qty: 180 TABLET | Refills: 0 | Status: SHIPPED | OUTPATIENT
Start: 2018-05-09 | End: 2018-09-08

## 2018-05-09 ASSESSMENT — PAIN SCALES - GENERAL: PAINLEVEL: NO PAIN (0)

## 2018-05-09 NOTE — LETTER
May 9, 2018      Carlton VIRY Bravo                                                                [address]  [place] [address]          Dear Carlton,    The results of your recent   -Liver and gallbladder tests (ALT,AST, Alk phos,bilirubin) are normal.  -Kidney function (GFR) is normal.  -Sodium is normal.  -Potassium is normal.  -Glucose is slight elevated and may be sign of early diabetes (prediabetes). ADVISE:: low carbohydrate diet, exercise, try to lose weight (if necessary) and recheck glucose in 12 months. (GLU,A1C, DX: prediabetes)  -Cholesterol levels are at your goal levels.  ADVISE: Continuing your medication, a regular exercise program with at least 30 minutes of aerobic exercise 3-4 days/week ( 45 minutes 4-6 days/week if weight loss needed), and a low saturated fat,/low carbohydrate diet are helpful to maintain this.  Rechecking your fasting cholesterol panel in 12 months is recommended (Lipid w/ LDL reflex).  -Hemoglobin is stable   -White blood cell and platelet counts are normal.  -A1C (diabetic test) is normal and indicates that your blood sugar has been in a normal range the last 3 months.  -Microalbumin (urine protein) level is elevated. This is suggestive of early damage to your kidneys from high blood pressure.  ADVISE: avoiding anti-inflamatory agents such as ibuprofen (Advil, Motrin) or naproxen (Aleve) as much as possible, keeping your blood pressure in a normal range, and rechecking your microalbumin level in 3 months (microalbumin; DX: Microalbuminuria)    Results for orders placed or performed in visit on 05/09/18   **Comprehensive metabolic panel FUTURE anytime   Result Value Ref Range    Sodium 144 133 - 144 mmol/L    Potassium 3.5 3.4 - 5.3 mmol/L    Chloride 107 94 - 109 mmol/L    Carbon Dioxide 27 20 - 32 mmol/L    Anion Gap 10 3 - 14 mmol/L    Glucose 118 (H) 70 - 99 mg/dL    Urea Nitrogen 8 7 - 30 mg/dL    Creatinine 0.81 0.66 - 1.25 mg/dL    GFR Estimate >90  >60 mL/min/1.7m2    GFR Estimate If Black >90 >60 mL/min/1.7m2    Calcium 8.5 8.5 - 10.1 mg/dL    Bilirubin Total 1.0 0.2 - 1.3 mg/dL    Albumin 3.3 (L) 3.4 - 5.0 g/dL    Protein Total 7.1 6.8 - 8.8 g/dL    Alkaline Phosphatase 86 40 - 150 U/L    ALT 33 0 - 70 U/L    AST 39 0 - 45 U/L   Lipid panel reflex to direct LDL Fasting   Result Value Ref Range    Cholesterol 166 <200 mg/dL    Triglycerides 80 <150 mg/dL    HDL Cholesterol 94 >39 mg/dL    LDL Cholesterol Calculated 56 <100 mg/dL    Non HDL Cholesterol 72 <130 mg/dL   Albumin Random Urine Quantitative with Creat Ratio   Result Value Ref Range    Creatinine Urine 135 mg/dL    Albumin Urine mg/L 561 mg/L    Albumin Urine mg/g Cr 415.56 (H) 0 - 17 mg/g Cr   **CBC with platelets FUTURE anytime   Result Value Ref Range    WBC 5.9 4.0 - 11.0 10e9/L    RBC Count 4.70 4.4 - 5.9 10e12/L    Hemoglobin 12.1 (L) 13.3 - 17.7 g/dL    Hematocrit 39.2 (L) 40.0 - 53.0 %    MCV 83 78 - 100 fl    MCH 25.7 (L) 26.5 - 33.0 pg    MCHC 30.9 (L) 31.5 - 36.5 g/dL    RDW 21.2 (H) 10.0 - 15.0 %    Platelet Count 250 150 - 450 10e9/L   Hemoglobin A1c   Result Value Ref Range    Hemoglobin A1C 5.6 0 - 5.6 %   '    Please make a follow-up appointment if you have additional questions.    Sincerely,     Lona Pettit, NP, APRN CNP

## 2018-05-09 NOTE — PROGRESS NOTES
SUBJECTIVE:   Carlton Bravo is a 77 year old male who presents to clinic today for the following health issues:   Concerns: Pt states that besides discussing his medications, does not have any other concerns    He reports no palpitations.  He says his functional status is unchanged but admits he is not very active.  He denies any palpitations feelings of near syncope or syncope.  He has not had any chest pain or chest discomfort.  He has not noted any unusual dyspnea on exertion.    Medication Followup of  Warfarin and Pravastatin     Taking Medication as prescribed: yes    Side Effects:  None    Medication Helping Symptoms:  yes     He has seen cardiology on 3/21/2018. and on 8/2/2017- those encounters were reviewed today, he reports no significant change in his health since March, but report that he has had gait issues for the last 2-3 months and feels the need to take short steps due to balance concerns. He has had no falls. He reports the gait issues were not a problem at his last cardiology visit and seem to have developed slowly without any specific event or abrupt onset. He has not seen his primary care provider since 1/23/17 and has gotten medications refilled by phone with hao refills.       Problem list and histories reviewed & adjusted, as indicated.  Additional history: as documented    Patient Active Problem List   Diagnosis     HTN, goal below 140/90     Idiopathic cardiomyopathy (H)     Dyslipidemia     Hemorrhage of gastrointestinal tract     Atrial fibrillation (HCC) [I48.91]     Long-term (current) use of anticoagulants [Z79.01]     Acute posthemorrhagic anemia     Alcohol abuse     Gastric ulcer with hemorrhage     Gastrointestinal hemorrhage     History of colonic polyps     Benign prostatic hyperplasia with urinary obstruction     Past Surgical History:   Procedure Laterality Date     KNEE SURGERY  1990    bilateral arthroscopic       Social History   Substance Use Topics     Smoking  status: Never Smoker     Smokeless tobacco: Never Used     Alcohol use 0.0 oz/week     0 Standard drinks or equivalent per week      Comment: was drinking about 5 drinks a night but quit 7/2015 due to GI bleed.      Family History   Problem Relation Age of Onset     Myocardial Infarction       CEREBROVASCULAR DISEASE Father      Hypertension No family hx of      DIABETES No family hx of      Colon Cancer No family hx of      Prostate Cancer No family hx of          Current Outpatient Prescriptions   Medication Sig Dispense Refill     carvedilol (COREG) 6.25 MG tablet TAKE 2 TABLETS BY MOUTH TWICE A  tablet 6     pantoprazole (PROTONIX) 40 MG EC tablet TAKE 1 TABLET BY MOUTH TWICE DAILY 180 tablet 0     pravastatin (PRAVACHOL) 80 MG tablet TAKE ONE TABLET BY MOUTH ONCE DAILY 90 tablet 0     warfarin (COUMADIN) 2 MG tablet Take 1 tablet (2mg) on Monday & 2 tabs (4mg) all other days or as directed by INR clinic 180 tablet 0     [DISCONTINUED] warfarin (COUMADIN) 2 MG tablet TAKE 2 TABS BY MOUTH DAILY 5 DAYS OF THE WEEK & 3 TABS DAILY THE OTHER 2 DAYS OF THE WEEK. 180 tablet 0     No Known Allergies  BP Readings from Last 3 Encounters:   05/09/18 126/84   03/21/18 (!) 150/96   08/02/17 160/90    Wt Readings from Last 3 Encounters:   05/09/18 218 lb 4.8 oz (99 kg)   03/21/18 217 lb 8 oz (98.7 kg)   08/02/17 231 lb 3.2 oz (104.9 kg)                    Reviewed and updated as needed this visit by clinical staff       Reviewed and updated as needed this visit by Provider         ROS:  the ten system review is negative except as noted in the HPI.     OBJECTIVE:     /84 (BP Location: Right arm, Patient Position: Sitting, Cuff Size: Adult Large)  Pulse 89  Temp 97  F (36.1  C) (Temporal)  Wt 218 lb 4.8 oz (99 kg)  SpO2 97%  BMI 28.03 kg/m2  Body mass index is 28.03 kg/(m^2).  GENERAL: healthy, alert, elderly and with generalized weakness   NECK: no adenopathy, no asymmetry, masses, or scars and thyroid normal  to palpation  RESP: lungs clear to auscultation - no rales, rhonchi or wheezes  CV: irregularly irregular rhythm, no murmur, click or rub, peripheral pulses strong and trace to 1+ bilateral lower extremity pitting edema     MS: no gross musculoskeletal defects noted, no edema  SKIN: no suspicious lesions or rashes  NEURO: mentation intact, speech normal, gait abnormal: unable to do tandem gait- walking with shortened steps and wide stance, unable to get up one step to the exam table due to weakness , Romberg no lateralization  PSYCH: mentation appears normal, affect normal/bright    Diagnostic Test Results:  Labs drawn today and reviewed or pending,     ASSESSMENT/PLAN:           ICD-10-CM    1. Encounter for medication refill Z76.0    2. Long-term (current) use of anticoagulants [Z79.01] Z79.01    3. Gait disturbance R26.9    Offered referral to neurology or Physical Therapy but pt refuses to consider either. He reports that his medications do not need to be refilled at this time but on review there were no refills authorized on his warfarin, pravastatin, or Protonix.        AVS given to patient.     Sergey Ross MD  [facility]

## 2018-05-09 NOTE — MR AVS SNAPSHOT
After Visit Summary   5/9/2018    Carlton Bravo    MRN: [id number]           Patient Information     Date Of Birth          1940        Visit Information        Provider Department      5/9/2018 9:30 AM Sergey Ross MD [facility]        Today's Diagnoses     Encounter for medication refill    -  1    Long-term (current) use of anticoagulants [Z79.01]        Gait disturbance           Follow-ups after your visit        Your next 10 appointments already scheduled     Jun 20, 2018  9:00 AM CDT   Anticoagulation Visit with INR MG   [facility] ([facility]    [address] [place] [id number] [phone]            Apr 03, 2019 10:00 AM CDT   Return Visit with Dakota Bentley MD   [facility] ([facility])    [address] [place] [id number] [phone]              Who to contact     If you have questions or need follow up information about today's clinic visit or your schedule please contact [facility] directly at [phone].  Normal or non-critical lab and imaging results will be communicated to you by MyChart, letter or phone within 4 business days after the clinic has received the results. If you do not hear from us within 7 days, please contact the clinic through [employer] or phone. If you have a critical or abnormal lab result, we will notify you by phone as soon as possible.  Submit refill requests through [employer] or call your pharmacy and they will forward the refill request to us. Please allow 3 business days for your refill to be completed.          Additional Information About Your Visit        [employer] Information     [employer] is an electronic gateway that provides easy, online access to your medical records. With [employer], you can request a clinic appointment, read your test results, renew a prescription or communicate with your care team.      To sign up for [employer] visit the website at [email or web address]   You will be asked to enter the access code listed below, as well as some personal information. Please follow the directions to create your username and password.     Your access code is: [email or web address]  Expires: 2018 11:26 AM     Your access code will  in 90 days. If you need help or a new code, please contact your [facility] or call [phone] for assistance.        Care EveryWhere ID     This is your Care EveryWhere ID. This could be used by other organizations to access your [place] medical records  [id number]        Your Vitals Were     Pulse Temperature Pulse Oximetry BMI (Body Mass Index)          89 97  F (36.1  C) (Temporal) 97% 28.03 kg/m2         Blood Pressure from Last 3 Encounters:   18 126/84   18 (!) 150/96   17 160/90    Weight from Last 3 Encounters:   18 99 kg (218 lb 4.8 oz)   18 98.7 kg (217 lb 8 oz)   17 104.9 kg (231 lb 3.2 oz)              Today, you had the following     No orders found for display         Today's Medication Changes          These changes are accurate as of 18  9:59 AM.  If you have any questions, ask your nurse or doctor.               These medicines have changed or have updated prescriptions.        Dose/Directions    warfarin 2 MG tablet   Commonly known as:  COUMADIN   This may have changed:  See the new instructions.   Used for:  Atrial fibrillation (H)        Take 1 tablet (2mg) on Monday & 2 tabs (4mg) all other days or as directed by INR clinic   Quantity:  180 tablet   Refills:  0            Where to get your medicines      These medications were sent to [facility] [address] - [place] - [address] [facility]  [address] [place] [address]     Phone:  [phone]     warfarin 2 MG tablet                Primary Care Provider Office Phone # Fax #    NI Pearson [phone]  [phone]       [address]  [place] [address]        Equal Access to Services     BRANDAN LEWIS : Hadii aad ku hadannao Soabhishekali, waaxda luqadaha, qaybta kaalmada catracho, laila shastain hayaarishabh bunchdiana graves laRaydalila bryant. So [facility] [phone].    ATENCIÓN: Si habla español, tiene a chen disposición servicios gratuitos de asistencia lingüística. Sandyame al [phone].    We comply with applicable federal civil rights laws and [place] laws. We do not discriminate on the basis of race, color, national origin, age, disability, sex, sexual orientation, or gender identity.            Thank you!     Thank you for choosing [facility]  for your care. Our goal is always to provide you with excellent care. Hearing back from our patients is one way we can continue to improve our services. Please take a few minutes to complete the written survey that you may receive in the mail after your visit with us. Thank you!             Your Updated Medication List - Protect others around you: Learn how to safely use, store and throw away your medicines at www.disposemymeds.org.          This list is accurate as of 5/9/18  9:59 AM.  Always use your most recent med list.                   Brand Name Dispense Instructions for use Diagnosis    carvedilol 6.25 MG tablet    COREG    120 tablet    TAKE 2 TABLETS BY MOUTH TWICE A DAY    Idiopathic cardiomyopathy (H), HTN, goal below 140/90       pantoprazole 40 MG EC tablet    PROTONIX    180 tablet    TAKE 1 TABLET BY MOUTH TWICE DAILY    Acute gastric ulcer with hemorrhage       pravastatin 80 MG tablet    PRAVACHOL    90 tablet    TAKE ONE TABLET BY MOUTH ONCE DAILY    Dyslipidemia       warfarin 2 MG tablet    COUMADIN    180 tablet    Take 1 tablet (2mg) on Monday & 2 tabs (4mg) all other days or as directed by INR clinic    Atrial fibrillation (H)

## 2018-05-09 NOTE — PROGRESS NOTES
ANTICOAGULATION FOLLOW-UP CLINIC VISIT    Patient Name:  Carlton Bravo  Date:  5/9/2018  Contact Type:  Face to Face    SUBJECTIVE:     Patient Findings     Positives No Problem Findings           OBJECTIVE    INR Protime   Date Value Ref Range Status   05/09/2018 2.5 (A) 0.86 - 1.14 Final       ASSESSMENT / PLAN  INR assessment THER    Recheck INR In: 6 WEEKS    INR Location Clinic      Anticoagulation Summary as of 5/9/2018     INR goal 2.0-3.0   Today's INR 2.5   Maintenance plan 2 mg (2 mg x 1) on Mon; 4 mg (2 mg x 2) all other days   Full instructions 2 mg on Mon; 4 mg all other days   Weekly total 26 mg   No change documented Annabelle Almaguer RN   Plan last modified Annabelle Almaguer RN (8/16/2017)   Next INR check 6/20/2018   Priority INR   Target end date     Indications   Atrial fibrillation (HCC) [I48.91] [I48.91]  Long-term (current) use of anticoagulants [Z79.01] [Z79.01]         Anticoagulation Episode Summary     INR check location     Preferred lab     Send INR reminders to MG  INR    Comments possible chromogenic 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

## 2018-05-09 NOTE — MR AVS SNAPSHOT
Carlton BAIRES Shelly   5/9/2018 9:00 AM   Anticoagulation Therapy Visit    Description:  77 year old male   Provider:  INR MG   Department:  [facility] Med Monitoring           INR as of 5/9/2018     Today's INR 2.5      Anticoagulation Summary as of 5/9/2018     INR goal 2.0-3.0   Today's INR 2.5   Full instructions 2 mg on Mon; 4 mg all other days   Next INR check 6/20/2018    Indications   Atrial fibrillation (HCC) [I48.91] [I48.91]  Long-term (current) use of anticoagulants [Z79.01] [Z79.01]         Your next Anticoagulation Clinic appointment(s)     Jun 20, 2018  9:00 AM CDT   Anticoagulation Visit with INR MG   [facility]    [address] [place] [id number] [phone]              Contact Numbers     [facility]  Please call the anticoagulation nurse at [phone] to cancel and/or reschedule your appointment, or with any problems or questions regarding your therapy.  You may call the main clinic number at [phone] if the nurse is not available.           May 2018 Details    Sun Mon Tue Wed Thu Fri Sat       1               2               3               4               5                 6               7               8               9      4 mg   See details      10      4 mg         11      4 mg         12      4 mg           13      4 mg         14      2 mg         15      4 mg         16      4 mg         17      4 mg         18      4 mg         19      4 mg           20      4 mg         21      2 mg         22      4 mg         23      4 mg         24      4 mg         25      4 mg         26      4 mg           27      4 mg         28      2 mg         29      4 mg         30      4 mg         31      4 mg            Date Details   05/09 This INR check               How to take your warfarin dose     To take:  2 mg Take 1 of the 2 mg tablets.    To take:  4 mg Take 2 of the 2 mg tablets.           June 2018 Details    Sun Mon Tue  Wed Thu Fri Sat          1      4 mg         2      4 mg           3      4 mg         4      2 mg         5      4 mg         6      4 mg         7      4 mg         8      4 mg         9      4 mg           10      4 mg         11      2 mg         12      4 mg         13      4 mg         14      4 mg         15      4 mg         16      4 mg           17      4 mg         18      2 mg         19      4 mg         20            21               22               23                 24               25               26               27               28               29               30                Date Details   No additional details    Date of next INR:  6/20/2018         How to take your warfarin dose     To take:  2 mg Take 1 of the 2 mg tablets.    To take:  4 mg Take 2 of the 2 mg tablets.

## 2018-05-17 DIAGNOSIS — I48.91 ATRIAL FIBRILLATION (H): ICD-10-CM

## 2018-05-17 RX ORDER — WARFARIN SODIUM 2 MG/1
TABLET ORAL
Qty: 180 TABLET | Refills: 0 | OUTPATIENT
Start: 2018-05-17

## 2018-05-17 NOTE — TELEPHONE ENCOUNTER
warfarin (COUMADIN) 2 MG tablet 180 tablet 0 5/9/2018  No   Sig: Take 1 tablet (2mg) on Monday & 2 tabs (4mg) all other days or as directed by INR clinic     Our records indicate duplicate request. Same requesting pharmacy. Annabelle Almaguer RN

## 2018-06-20 ENCOUNTER — ANTICOAGULATION THERAPY VISIT (OUTPATIENT)
Dept: PHARMACY | Facility: CLINIC | Age: 78
End: 2018-06-20
Payer: MEDICARE

## 2018-06-20 DIAGNOSIS — I48.91 ATRIAL FIBRILLATION, UNSPECIFIED TYPE (H): ICD-10-CM

## 2018-06-20 DIAGNOSIS — Z79.01 LONG-TERM (CURRENT) USE OF ANTICOAGULANTS: ICD-10-CM

## 2018-06-20 LAB — INR POINT OF CARE: 1.5 (ref 0.86–1.14)

## 2018-06-20 PROCEDURE — 99207 ZZC NO CHARGE NURSE ONLY: CPT

## 2018-06-20 PROCEDURE — 36416 COLLJ CAPILLARY BLOOD SPEC: CPT

## 2018-06-20 PROCEDURE — 85610 PROTHROMBIN TIME: CPT | Mod: QW

## 2018-06-20 NOTE — PROGRESS NOTES
"  ANTICOAGULATION FOLLOW-UP CLINIC VISIT    Patient Name:  Carlton Bravo  Date:  6/20/2018  Contact Type:  Face to Face    SUBJECTIVE:     Patient Findings     Positives Change in medications    Comments Patient reports that he noticed his BP readings were becoming hypotensive - although he denies symptoms related to the hypotensive readings, he does report that he \"expiremented\" with his Coreg 6.25 mg tablets. Patient is prescribed to take 2 tablets BID, patient reports that he cut the dose in half for approximately 1 week to two tabs once daily. Patient stated he was getting really good BP readings, until a couple days ago he noticed that his BP began elevating and was then hypertensive. Patient reports that he resumed the two tablets BID just 2 days ago. This is the only change at this time. Patient declined returning in 1 week and reports being out of town the 4th of July week although advised to recheck INR sooner. No dosage changes due to the fact that the patient has resumed his prescribed dosing of the Coreg.            OBJECTIVE    INR Protime   Date Value Ref Range Status   06/20/2018 1.5 (A) 0.86 - 1.14 Final       ASSESSMENT / PLAN  INR assessment SUB    Recheck INR In: 2 WEEKS    INR Location Clinic      Anticoagulation Summary as of 6/20/2018     INR goal 2.0-3.0   Today's INR 1.5!   Warfarin maintenance plan 2 mg (2 mg x 1) on Mon; 4 mg (2 mg x 2) all other days   Full warfarin instructions 2 mg on Mon; 4 mg all other days   Weekly warfarin total 26 mg   No change documented Annabelle Almaguer RN   Plan last modified Annabelle Almaguer RN (8/16/2017)   Next INR check 7/9/2018   Priority INR   Target end date     Indications   Atrial fibrillation (HCC) [I48.91] [I48.91]  Long-term (current) use of anticoagulants [Z79.01] [Z79.01]         Anticoagulation Episode Summary     INR check location     Preferred lab     Send INR reminders to MG [facility]ERESA INR    Comments possible chromogenic 10/antiphosholipid syndrome        "     See the Encounter Report to view Anticoagulation Flowsheet and Dosing Calendar (Go to Encounters tab in chart review, and find the Anticoagulation Therapy Visit)      Annabelle Almaguer RN

## 2018-06-20 NOTE — MR AVS SNAPSHOT
Carlton Bravo   6/20/2018 9:00 AM   Anticoagulation Therapy Visit    Description:  78 year old male   Provider:  INR MG   Department:  [facility] Monitoring           INR as of 6/20/2018     Today's INR 1.5!      Anticoagulation Summary as of 6/20/2018     INR goal 2.0-3.0   Today's INR 1.5!   Full warfarin instructions 2 mg on Mon; 4 mg all other days   Next INR check 7/9/2018    Indications   Atrial fibrillation (HCC) [I48.91] [I48.91]  Long-term (current) use of anticoagulants [Z79.01] [Z79.01]         Your next Anticoagulation Clinic appointment(s)     Jul 09, 2018  9:00 AM CDT   Anticoagulation Visit with INR MG   [facility]    [address] [place] [id number] [phone]              Contact Numbers     [facility]  Please call the anticoagulation nurse at [phone] to cancel and/or reschedule your appointment, or with any problems or questions regarding your therapy.  You may call the main clinic number at [phone] if the nurse is not available.           June 2018 Details    Sun Mon Tue Wed Thu Fri Sat          1               2                 3               4               5               6               7               8               9                 10               11               12               13               14               15               16                 17               18               19               20      4 mg   See details      21      4 mg         22      4 mg         23      4 mg           24      4 mg         25      2 mg         26      4 mg         27      4 mg         28      4 mg         29      4 mg         30      4 mg          Date Details   06/20 This INR check               How to take your warfarin dose     To take:  2 mg Take 1 of the 2 mg tablets.    To take:  4 mg Take 2 of the 2 mg tablets.           July 2018 Details    Sun Mon Tue Wed Thu Fri Sat     1      4 mg         2      2  mg         3      4 mg         4      4 mg         5      4 mg         6      4 mg         7      4 mg           8      4 mg         9            10               11               12               13               14                 15               16               17               18               19               20               21                 22               23               24               25               26               27               28                 29               30               31                    Date Details   No additional details    Date of next INR:  7/9/2018         How to take your warfarin dose     To take:  2 mg Take 1 of the 2 mg tablets.    To take:  4 mg Take 2 of the 2 mg tablets.

## 2018-07-09 ENCOUNTER — ANTICOAGULATION THERAPY VISIT (OUTPATIENT)
Dept: PHARMACY | Facility: CLINIC | Age: 78
End: 2018-07-09
Payer: MEDICARE

## 2018-07-09 DIAGNOSIS — I48.91 ATRIAL FIBRILLATION, UNSPECIFIED TYPE (H): ICD-10-CM

## 2018-07-09 DIAGNOSIS — Z79.01 LONG-TERM (CURRENT) USE OF ANTICOAGULANTS: ICD-10-CM

## 2018-07-09 LAB
INR POINT OF CARE: 7.6 (ref 0.86–1.14)
INR PPP: 4.24 (ref 0.86–1.14)

## 2018-07-09 PROCEDURE — 85610 PROTHROMBIN TIME: CPT | Mod: QW

## 2018-07-09 PROCEDURE — 85610 PROTHROMBIN TIME: CPT | Performed by: NURSE PRACTITIONER

## 2018-07-09 PROCEDURE — 36416 COLLJ CAPILLARY BLOOD SPEC: CPT

## 2018-07-09 PROCEDURE — 99207 ZZC NO CHARGE NURSE ONLY: CPT

## 2018-07-09 NOTE — PROGRESS NOTES
ANTICOAGULATION FOLLOW-UP CLINIC VISIT    Patient Name:  Carlton Bravo  Date:  7/9/2018  Contact Type:  Face to Face    SUBJECTIVE:     Patient Findings     Positives Unexplained INR or factor level change    Comments Patient denies bleeding symptoms/concerns. Discussed bleeding symptoms to monitor for and when/where to seek medical attention.              OBJECTIVE    INR   Date Value Ref Range Status   07/09/2018 4.24 (H) 0.86 - 1.14 Final       ASSESSMENT / PLAN  INR assessment SUPRA    Recheck INR In: 2 DAYS    INR Location Clinic      Anticoagulation Summary as of 7/9/2018     INR goal 2.0-3.0   Today's INR 4.24!   Warfarin maintenance plan 2 mg (2 mg x 1) on Mon; 4 mg (2 mg x 2) all other days   Full warfarin instructions 7/10: Hold; 7/11: 2 mg; Otherwise 2 mg on Mon; 4 mg all other days   Weekly warfarin total 26 mg   Plan last modified Annabelle Almaguer RN (8/16/2017)   Next INR check 7/12/2018   Priority INR   Target end date     Indications   Atrial fibrillation (HCC) [I48.91] [I48.91]  Long-term (current) use of anticoagulants [Z79.01] [Z79.01]         Anticoagulation Episode Summary     INR check location     Preferred lab     Send INR reminders to MG  INR    Comments Take in AM. possible chromogenic 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

## 2018-07-09 NOTE — MR AVS SNAPSHOT
Carlton Bravo   7/9/2018 9:00 AM   Anticoagulation Therapy Visit    Description:  78 year old male   Provider:  INR MG   Department:  [facility] Monitoring           INR as of 7/9/2018     Today's INR 4.24!      Anticoagulation Summary as of 7/9/2018     INR goal 2.0-3.0   Today's INR 4.24!   Full warfarin instructions 7/10: Hold; 7/11: 2 mg; Otherwise 2 mg on Mon; 4 mg all other days   Next INR check 7/12/2018    Indications   Atrial fibrillation (HCC) [I48.91] [I48.91]  Long-term (current) use of anticoagulants [Z79.01] [Z79.01]         Your next Anticoagulation Clinic appointment(s)     Jul 12, 2018  9:00 AM CDT   Anticoagulation Visit with INR MG   [facility]    [address] [place] [id number] [phone]              Contact Numbers     [facility]  Please call the anticoagulation nurse at [phone] to cancel and/or reschedule your appointment, or with any problems or questions regarding your therapy.  You may call the main clinic number at [phone] if the nurse is not available.           July 2018 Details    Sun Mon Tue Wed Thu Fri Sat     1               2               3               4               5               6               7                 8               9      2 mg   See details      10      Hold         11      2 mg         12            13               14                 15               16               17               18               19               20               21                 22               23               24               25               26               27               28                 29               30               31                    Date Details   07/09 This INR check       Date of next INR:  7/12/2018         How to take your warfarin dose     To take:  2 mg Take 1 of the 2 mg tablets.    To take:  4 mg Take 2 of the 2 mg tablets.    Hold Do not take your warfarin dose. See the  Details table to the right for additional instructions.

## 2018-07-13 ENCOUNTER — TELEPHONE (OUTPATIENT)
Dept: PHARMACY | Facility: CLINIC | Age: 78
End: 2018-07-13

## 2018-07-13 ENCOUNTER — ANTICOAGULATION THERAPY VISIT (OUTPATIENT)
Dept: PHARMACY | Facility: CLINIC | Age: 78
End: 2018-07-13
Payer: MEDICARE

## 2018-07-13 DIAGNOSIS — I48.91 ATRIAL FIBRILLATION, UNSPECIFIED TYPE (H): Primary | ICD-10-CM

## 2018-07-13 DIAGNOSIS — I48.91 ATRIAL FIBRILLATION, UNSPECIFIED TYPE (H): ICD-10-CM

## 2018-07-13 DIAGNOSIS — Z79.01 LONG-TERM (CURRENT) USE OF ANTICOAGULANTS: ICD-10-CM

## 2018-07-13 LAB — INR POINT OF CARE: 4 (ref 0.86–1.14)

## 2018-07-13 PROCEDURE — 85610 PROTHROMBIN TIME: CPT | Mod: QW

## 2018-07-13 PROCEDURE — 99207 ZZC NO CHARGE NURSE ONLY: CPT

## 2018-07-13 PROCEDURE — 36416 COLLJ CAPILLARY BLOOD SPEC: CPT

## 2018-07-13 NOTE — PROGRESS NOTES
"  ANTICOAGULATION FOLLOW-UP CLINIC VISIT    Patient Name:  Carlton Bravo  Date:  7/13/2018  Contact Type:  Face to Face    SUBJECTIVE:     Patient Findings     Positives Unexplained INR or factor level change    Comments Patient denies bleeding symptoms/concerns. Discussed bleeding symptoms to monitor for and when/where to seek medical attention.     Patient expressed frustration over unknown cause of INR result today. Patient expressed concern over the accuracy of the POC machine. Informed patient how the POC machine works and when the accuracy is affected after 4.5 INR readings. Patient declined returning when advised which was in 3 days. Patient stated \"I am not coming back again for 6 weeks\" writer informed patient of his risks and highly encouraged that he be seen sooner. Informed patient that his doctor would be notified of the non-compliance in regard to following INR clinic protocol and recommendations. Patient then stated \"I will come back in 2 weeks and not one day sooner.\" Writer helped patient to schedule 2 weeks from today and encouraged him to call with any bleeding symptoms, patient stated he would.     Lona Pettit CNP notified.            OBJECTIVE    INR Protime   Date Value Ref Range Status   07/13/2018 4.0 (A) 0.86 - 1.14 Final       ASSESSMENT / PLAN  INR assessment SUPRA    Recheck INR In: 2 WEEKS    INR Location Clinic      Anticoagulation Summary as of 7/13/2018     INR goal 2.0-3.0   Today's INR 4.0!   Warfarin maintenance plan 4 mg (2 mg x 2) on Wed; 2 mg (2 mg x 1) all other days   Full warfarin instructions 7/13: Hold; 7/14: 2 mg; 7/15: 2 mg; 7/17: 2 mg; 7/19: 2 mg; 7/20: 2 mg; 7/21: 2 mg; 7/22: 2 mg; 7/24: 2 mg; 7/26: 2 mg; 7/27: 2 mg; 7/28: 2 mg; 7/29: 2 mg; Otherwise 4 mg on Wed; 2 mg all other days   Weekly warfarin total 16 mg   Plan last modified Annabelle Almaguer, RN (7/13/2018)   Next INR check 7/30/2018   Priority INR   Target end date     Indications   Atrial fibrillation (HCC) " [I48.91] [I48.91]  Long-term (current) use of anticoagulants [Z79.01] [Z79.01]         Anticoagulation Episode Summary     INR check location     Preferred lab     Send INR reminders to [facility] INR    Comments Take in AM. possible chromogenic 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

## 2018-07-13 NOTE — TELEPHONE ENCOUNTER
Pending INR clinic referral - due for annual renewal. Please review, sign, and close encounter when done.     Thank you,     Annabelle Almaguer RN

## 2018-07-13 NOTE — MR AVS SNAPSHOT
Carlton Bravo   7/13/2018 9:00 AM   Anticoagulation Therapy Visit    Description:  78 year old male   Provider:  INR MG   Department:  Mg Med Monitoring           INR as of 7/13/2018     Today's INR 4.0!      Anticoagulation Summary as of 7/13/2018     INR goal 2.0-3.0   Today's INR 4.0!   Full warfarin instructions 7/13: Hold; 7/14: 2 mg; 7/15: 2 mg; 7/17: 2 mg; 7/19: 2 mg; 7/20: 2 mg; 7/21: 2 mg; 7/22: 2 mg; 7/24: 2 mg; 7/26: 2 mg; 7/27: 2 mg; 7/28: 2 mg; 7/29: 2 mg; Otherwise 4 mg on Wed; 2 mg all other days   Next INR check 7/30/2018    Indications   Atrial fibrillation (HCC) [I48.91] [I48.91]  Long-term (current) use of anticoagulants [Z79.01] [Z79.01]         Your next Anticoagulation Clinic appointment(s)     Jul 30, 2018  9:00 AM CDT   [facility]ticoagulation Visit with INR MG   [facility]    [address] [place] [address] [phone]              Contact Numbers     [facility]  Please call the anticoagulation nurse at [phone] to cancel and/or reschedule your appointment, or with any problems or questions regarding your therapy.  You may call the main clinic number at [phone] if the nurse is not available.           July 2018 Details    Sun Mon Tue Wed Thu Fri Sat     1               2               3               4               5               6               7                 8               9               10               11               12               13      Hold   See details      14      2 mg           15      2 mg         16      2 mg         17      2 mg         18      4 mg         19      2 mg         20      2 mg         21      2 mg           22      2 mg         23      2 mg         24      2 mg         25      4 mg         26      2 mg         27      2 mg         28      2 mg           29      2 mg         30            31                    Date Details   07/13 This INR check       Date of next INR:   7/30/2018         How to take your warfarin dose     To take:  2 mg Take 1 of the 2 mg tablets.    To take:  4 mg Take 2 of the 2 mg tablets.    Hold Do not take your warfarin dose. See the Details table to the right for additional instructions.

## 2018-07-13 NOTE — TELEPHONE ENCOUNTER
"WALE    Patient in clinic on 7/9 with a 4.7 unexplained INR reading. Patient asymptomatic.     Patient returned to clinic today 7/13 with a 4.0 INR and continues to be asymptomatic.     INR clinic recommended that patient return in 3 days (7/16). Patient expressed frustration with the frequency of visits. He stated \"I will come back in 6 weeks.\"    Writer expressed the risks that are at hand with a supratherapeutic INR. Patient stated \"I am aware.\"     Encouraged patient to return sooner than 6 weeks. He stated \"I will come back in 2 weeks and not one day sooner.\"    INR protocol advises for a return visit in 2-4 days for an INR of 4.0 and to notify the patients provider of non-compliance when associated with an out of range INR.     Annbaelle Almaguer RN    "

## 2018-07-29 DIAGNOSIS — E78.5 DYSLIPIDEMIA: ICD-10-CM

## 2018-07-30 ENCOUNTER — ANTICOAGULATION THERAPY VISIT (OUTPATIENT)
Dept: PHARMACY | Facility: CLINIC | Age: 78
End: 2018-07-30
Payer: MEDICARE

## 2018-07-30 DIAGNOSIS — I48.91 ATRIAL FIBRILLATION, UNSPECIFIED TYPE (H): ICD-10-CM

## 2018-07-30 DIAGNOSIS — Z79.01 LONG-TERM (CURRENT) USE OF ANTICOAGULANTS: ICD-10-CM

## 2018-07-30 LAB — INR POINT OF CARE: 1.4 (ref 0.86–1.14)

## 2018-07-30 PROCEDURE — 99207 ZZC NO CHARGE NURSE ONLY: CPT

## 2018-07-30 PROCEDURE — 85610 PROTHROMBIN TIME: CPT | Mod: QW

## 2018-07-30 PROCEDURE — 36416 COLLJ CAPILLARY BLOOD SPEC: CPT

## 2018-07-30 RX ORDER — PRAVASTATIN SODIUM 80 MG/1
TABLET ORAL
Qty: 90 TABLET | Refills: 2 | Status: SHIPPED | OUTPATIENT
Start: 2018-07-30 | End: 2019-01-02

## 2018-07-30 NOTE — PROGRESS NOTES
"  ANTICOAGULATION FOLLOW-UP CLINIC VISIT    Patient Name:  Carlton Bravo  Date:  7/30/2018  Contact Type:  Face to Face    SUBJECTIVE:     Patient Findings     Positives Intentional hold of therapy    Comments Patient declined x3 to come in 1 week as advised by INR clinic. Patient states he will not come back that soon because it is \"ridiculous and unnecessary.\" Writer attempted to explain the importance of INR monitoring and the patient cut the writer off mid-sentence stating \"I am well aware and it does not change anything, I will come back in 4 weeks and not one day sooner.\"    Informed patient of clotting risks and when to call or seek medical attention.            OBJECTIVE    INR Protime   Date Value Ref Range Status   07/30/2018 1.4 (A) 0.86 - 1.14 Final       ASSESSMENT / PLAN  INR assessment SUB    Recheck INR In: 4 WEEKS    INR Location Clinic      Anticoagulation Summary as of 7/30/2018     INR goal 2.0-3.0   Today's INR 1.4!   Warfarin maintenance plan 4 mg (2 mg x 2) on Mon, Wed, Fri; 2 mg (2 mg x 1) all other days   Full warfarin instructions 7/30: 4 mg; 8/3: 4 mg; Otherwise 4 mg on Mon, Wed, Fri; 2 mg all other days   Weekly warfarin total 20 mg   Plan last modified Annabelle Almaguer, RN (7/30/2018)   Next INR check 8/27/2018   Priority INR   Target end date     Indications   Atrial fibrillation (HCC) [I48.91] [I48.91]  Long-term (current) use of anticoagulants [Z79.01] [Z79.01]         Anticoagulation Episode Summary     INR check location     Preferred lab     Send INR reminders to [facility] INR    Comments Take in AM. possible chromogenic 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               "

## 2018-07-30 NOTE — MR AVS SNAPSHOT
Carlton Bravo   7/30/2018 9:00 AM   Anticoagulation Therapy Visit    Description:  78 year old male   Provider:  INR MG   Department:  Mg Med Monitoring           INR as of 7/30/2018     Today's INR 1.4!      Anticoagulation Summary as of 7/30/2018     INR goal 2.0-3.0   Today's INR 1.4!   Full warfarin instructions 7/30: 4 mg; 8/3: 4 mg; Otherwise 4 mg on Mon, Wed, Fri; 2 mg all other days   Next INR check 8/27/2018    Indications   Atrial fibrillation (HCC) [I48.91] [I48.91]  Long-term (current) use of anticoagulants [Z79.01] [Z79.01]         Your next Anticoagulation Clinic appointment(s)     Aug 27, 2018  9:00 AM CDT   Anticoagulation Visit with INR MG   [facility]    [address] [place] [address] [phone]              Contact Numbers     [facility]  Please call the anticoagulation nurse at [phone] to cancel and/or reschedule your appointment, or with any problems or questions regarding your therapy.  You may call the main clinic number at [phone] if the nurse is not available.           July 2018 Details    Sun Mon Tue Wed Thu Fri Sat     1               2               3               4               5               6               7                 8               9               10               11               12               13               14                 15               16               17               18               19               20               21                 22               23               24               25               26               27               28                 29               30      4 mg   See details      31      2 mg              Date Details   07/30 This INR check               How to take your warfarin dose     To take:  2 mg Take 1 of the 2 mg tablets.    To take:  4 mg Take 2 of the 2 mg tablets.           August 2018 Details    Sun Mon Tue Wed Thu Fri Sat         1      4 mg         2      2 mg         3      4 mg         4      2 mg           5      2 mg         6      4 mg         7      2 mg         8      4 mg         9      2 mg         10      4 mg         11      2 mg           12      2 mg         13      4 mg         14      2 mg         15      4 mg         16      2 mg         17      4 mg         18      2 mg           19      2 mg         20      4 mg         21      2 mg         22      4 mg         23      2 mg         24      4 mg         25      2 mg           26      2 mg         27            28               29               30               31                 Date Details   No additional details    Date of next INR:  8/27/2018         How to take your warfarin dose     To take:  2 mg Take 1 of the 2 mg tablets.    To take:  4 mg Take 2 of the 2 mg tablets.

## 2018-07-30 NOTE — TELEPHONE ENCOUNTER
Patient was last seen by Dr. Ross on 5/9/18.      Lab Results   Component Value Date    CHOL 166 05/09/2018     Lab Results   Component Value Date    HDL 94 05/09/2018     Lab Results   Component Value Date    LDL 56 05/09/2018     Lab Results   Component Value Date    TRIG 80 05/09/2018     Lab Results   Component Value Date    CHOLHDLRATIO 3.2 10/20/2015     Refill sent per RN protocol.    Nisha Blanco RN, [facility]

## 2018-08-27 ENCOUNTER — ANTICOAGULATION THERAPY VISIT (OUTPATIENT)
Dept: PHARMACY | Facility: CLINIC | Age: 78
End: 2018-08-27
Payer: MEDICARE

## 2018-08-27 DIAGNOSIS — I48.91 ATRIAL FIBRILLATION, UNSPECIFIED TYPE (H): ICD-10-CM

## 2018-08-27 DIAGNOSIS — Z79.01 LONG-TERM (CURRENT) USE OF ANTICOAGULANTS: ICD-10-CM

## 2018-08-27 LAB — INR POINT OF CARE: 2 (ref 0.86–1.14)

## 2018-08-27 PROCEDURE — 99207 ZZC NO CHARGE NURSE ONLY: CPT

## 2018-08-27 PROCEDURE — 36416 COLLJ CAPILLARY BLOOD SPEC: CPT

## 2018-08-27 PROCEDURE — 85610 PROTHROMBIN TIME: CPT | Mod: QW

## 2018-08-27 NOTE — PROGRESS NOTES
ANTICOAGULATION FOLLOW-UP CLINIC VISIT    Patient Name:  Carlton Bravo  Date:  8/27/2018  Contact Type:  Face to Face    SUBJECTIVE:     Patient Findings     Positives No Problem Findings           OBJECTIVE    INR Protime   Date Value Ref Range Status   08/27/2018 2.0 (A) 0.86 - 1.14 Final       ASSESSMENT / PLAN  INR assessment THER    Recheck INR In: 6 WEEKS    INR Location Clinic      Anticoagulation Summary as of 8/27/2018     INR goal 2.0-3.0   Today's INR 2.0   Warfarin maintenance plan 4 mg (2 mg x 2) on Mon, Wed, Fri; 2 mg (2 mg x 1) all other days   Full warfarin instructions 4 mg on Mon, Wed, Fri; 2 mg all other days   Weekly warfarin total 20 mg   No change documented Annabelle Almaguer RN   Plan last modified Annabelle Almaguer RN (7/30/2018)   Next INR check 10/8/2018   Priority INR   Target end date     Indications   Atrial fibrillation (HCC) [I48.91] [I48.91]  Long-term (current) use of anticoagulants [Z79.01] [Z79.01]         Anticoagulation Episode Summary     INR check location     Preferred lab     Send INR reminders to [facility] INR    Comments Take in AM. possible chromogenic 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

## 2018-08-27 NOTE — MR AVS SNAPSHOT
Carlton Hutsonderrick   8/27/2018 9:00 AM   Anticoagulation Therapy Visit    Description:  78 year old male   Provider:  INR MG   Department:  Mg Med Monitoring           INR as of 8/27/2018     Today's INR 2.0      Anticoagulation Summary as of 8/27/2018     INR goal 2.0-3.0   Today's INR 2.0   Full warfarin instructions 4 mg on Mon, Wed, Fri; 2 mg all other days   Next INR check 10/8/2018    Indications   Atrial fibrillation (HCC) [I48.91] [I48.91]  Long-term (current) use of anticoagulants [Z79.01] [Z79.01]         Your next Anticoagulation Clinic appointment(s)     Oct 08, 2018  9:00 AM CDT   [facility]agulation Visit with INR MG   [facility]    [address] [place] [address] [phone]              Contact Numbers     [facility]  Please call the anticoagulation nurse at [phone] to cancel and/or reschedule your appointment, or with any problems or questions regarding your therapy.  You may call the main clinic number at [phone] if the nurse is not available.           August 2018 Details    Sun Mon Tue Wed Thu Fri Sat        1               2               3               4                 5               6               7               8               9               10               11                 12               13               14               15               16               17               18                 19               20               21               22               23               24               25                 26               27      4 mg   See details      28      2 mg         29      4 mg         30      2 mg         31      4 mg           Date Details   08/27 This INR check               How to take your warfarin dose     To take:  2 mg Take 1 of the 2 mg tablets.    To take:  4 mg Take 2 of the 2 mg tablets.           September 2018 Details    Sun Mon Tue Wed Thu Fri Sat           1      2 mg            2      2 mg         3      4 mg         4      2 mg         5      4 mg         6      2 mg         7      4 mg         8      2 mg           9      2 mg         10      4 mg         11      2 mg         12      4 mg         13      2 mg         14      4 mg         15      2 mg           16      2 mg         17      4 mg         18      2 mg         19      4 mg         20      2 mg         21      4 mg         22      2 mg           23      2 mg         24      4 mg         25      2 mg         26      4 mg         27      2 mg         28      4 mg         29      2 mg           30      2 mg                Date Details   No additional details            How to take your warfarin dose     To take:  2 mg Take 1 of the 2 mg tablets.    To take:  4 mg Take 2 of the 2 mg tablets.           October 2018 Details    Sun Mon Tue Wed Thu Fri Sat      1      4 mg         2      2 mg         3      4 mg         4      2 mg         5      4 mg         6      2 mg           7      2 mg         8            9               10               11               12               13                 14               15               16               17               18               19               20                 21               22               23               24               25               26               27                 28               29               30               31                   Date Details   No additional details    Date of next INR:  10/8/2018         How to take your warfarin dose     To take:  2 mg Take 1 of the 2 mg tablets.    To take:  4 mg Take 2 of the 2 mg tablets.

## 2018-09-08 DIAGNOSIS — I48.91 ATRIAL FIBRILLATION (H): ICD-10-CM

## 2018-09-10 RX ORDER — WARFARIN SODIUM 2 MG/1
TABLET ORAL
Qty: 180 TABLET | Refills: 0 | Status: SHIPPED | OUTPATIENT
Start: 2018-09-10 | End: 2019-01-02

## 2018-09-10 NOTE — TELEPHONE ENCOUNTER
warfarin (COUMADIN) 2 MG tablet 180 tablet 0 5/9/2018  No   Sig: Take 1 tablet (2mg) on Monday & 2 tabs (4mg) all other days or as directed by INR clinic     Last OV with SONYA Pettit CNP: 1/23/2017 and 3/21/2018 with Cardio    No future apts.     Last INR:8/27/2018 patient therapeutic and noncompliant with recommended INR rechecks.     Refilled per [facility] protocol.    Annabelle Almaguer RN

## 2018-10-08 ENCOUNTER — ANTICOAGULATION THERAPY VISIT (OUTPATIENT)
Dept: PHARMACY | Facility: CLINIC | Age: 78
End: 2018-10-08
Payer: MEDICARE

## 2018-10-08 DIAGNOSIS — I48.91 ATRIAL FIBRILLATION, UNSPECIFIED TYPE (H): ICD-10-CM

## 2018-10-08 LAB — INR POINT OF CARE: 1.7 (ref 0.86–1.14)

## 2018-10-08 PROCEDURE — 99207 ZZC NO CHARGE NURSE ONLY: CPT

## 2018-10-08 PROCEDURE — 85610 PROTHROMBIN TIME: CPT | Mod: QW

## 2018-10-08 PROCEDURE — 36416 COLLJ CAPILLARY BLOOD SPEC: CPT

## 2018-10-08 NOTE — MR AVS SNAPSHOT
Carlton Bravo   10/8/2018 9:00 AM   Anticoagulation Therapy Visit    Description:  78 year old male   Provider:  INR MG   Department:  Mg Med Monitoring           INR as of 10/8/2018     Today's INR 1.7!      Anticoagulation Summary as of 10/8/2018     INR goal 2.0-3.0   Today's INR 1.7!   Full warfarin instructions 10/9: 4 mg; Otherwise 2 mg on Sun, Thu, Sat; 4 mg all other days   Next INR check 11/5/2018    Indications   Atrial fibrillation (HCC) [I48.91] [I48.91]  Long-term (current) use of anticoagulants [Z79.01] [Z79.01]         Your next Anticoagulation Clinic appointment(s)     Nov 05, 2018  9:00 AM CST   Anticoagulation Visit with INR MG   [facility]    [address] [place] [address] [phone]              Contact Numbers     [facility]  Please call the anticoagulation nurse at [phone] to cancel and/or reschedule your appointment, or with any problems or questions regarding your therapy.  You may call the main clinic number at [phone] if the nurse is not available.           October 2018 Details    Sun Mon Tue Wed Thu Fri Sat      1               2               3               4               5               6                 7               8      4 mg   See details      9      4 mg         10      4 mg         11      2 mg         12      4 mg         13      2 mg           14      2 mg         15      4 mg         16      4 mg         17      4 mg         18      2 mg         19      4 mg         20      2 mg           21      2 mg         22      4 mg         23      4 mg         24      4 mg         25      2 mg         26      4 mg         27      2 mg           28      2 mg         29      4 mg         30      4 mg         31      4 mg             Date Details   10/08 This INR check               How to take your warfarin dose     To take:  2 mg Take 1 of the 2 mg tablets.    To take:  4 mg Take 2 of the 2  mg tablets.           November 2018 Details    Sun Mon Tue Wed Thu Fri Sat         1      2 mg         2      4 mg         3      2 mg           4      2 mg         5            6               7               8               9               10                 11               12               13               14               15               16               17                 18               19               20               21               22               23               24                 25               26               27               28               29               30                 Date Details   No additional details    Date of next INR:  11/5/2018         How to take your warfarin dose     To take:  2 mg Take 1 of the 2 mg tablets.    To take:  4 mg Take 2 of the 2 mg tablets.

## 2018-10-08 NOTE — PROGRESS NOTES
ANTICOAGULATION FOLLOW-UP CLINIC VISIT    Patient Name:  Carlton Bravo  Date:  10/8/2018  Contact Type:  Face to Face    SUBJECTIVE:     Patient Findings     Positives Unexplained INR or factor level change    Comments Patient declined returning any sooner than 4 weeks. Patient continues to be argumentive with INR clinic. Writer discussed RN's role and responsibility under doctors referral. Also reeducated the patient on the importance of INR monitoring. Patient verbalized understanding.            OBJECTIVE    INR Protime   Date Value Ref Range Status   10/08/2018 1.7 (A) 0.86 - 1.14 Final       ASSESSMENT / PLAN  INR assessment SUB    Recheck INR In: 4 WEEKS    INR Location Clinic      Anticoagulation Summary as of 10/8/2018     INR goal 2.0-3.0   Today's INR 1.7!   Warfarin maintenance plan 2 mg (2 mg x 1) on Sun, Thu, Sat; 4 mg (2 mg x 2) all other days   Full warfarin instructions 10/9: 4 mg; Otherwise 2 mg on Sun, Thu, Sat; 4 mg all other days   Weekly warfarin total 22 mg   Plan last modified Annabelle Almaguer RN (10/8/2018)   Next INR check 11/5/2018   Priority INR   Target end date     Indications   Atrial fibrillation (HCC) [I48.91] [I48.91]  Long-term (current) use of anticoagulants [Z79.01] [Z79.01]         Anticoagulation Episode Summary     INR check location     Preferred lab     Send INR reminders to [facility] INR    Comments Take in AM. possible chromogenic 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

## 2018-11-02 DIAGNOSIS — K25.0 ACUTE GASTRIC ULCER WITH HEMORRHAGE: ICD-10-CM

## 2018-11-02 NOTE — LETTER
November 5, 2018      Carlton Bravo  [address]  [facility] [address]              Dear Carlton Bravo,      The refill request made by your pharmacy was received at the clinic.     Signed Prescriptions:                        Disp   Refills    pantoprazole (PROTONIX) 40 MG EC tablet    180 ta*0        Sig: TAKE 1 TABLET BY MOUTH TWICE DAILY  Authorizing Provider: LAXMI MCQUEEN    At this time you are due in the clinic for a follow up appointment. A one time hao refill has been sent to your pharmacy.     Please call your clinic to make an appointment with your provider before you run out of medication. This will prevent a delay in your next month's refill.    [facility]- [phone]    For your convenience we also offer online appointment scheduling at [email or web address] or [email or web address].     We invite you to refill your next prescription at a [facility] or take advantage of our prescription mail service.    Sincerely,    Your [facility] Team

## 2018-11-05 ENCOUNTER — ANTICOAGULATION THERAPY VISIT (OUTPATIENT)
Dept: PHARMACY | Facility: CLINIC | Age: 78
End: 2018-11-05
Payer: MEDICARE

## 2018-11-05 DIAGNOSIS — I48.91 ATRIAL FIBRILLATION, UNSPECIFIED TYPE (H): ICD-10-CM

## 2018-11-05 LAB — INR POINT OF CARE: 5.4 (ref 0.86–1.14)

## 2018-11-05 PROCEDURE — 99207 ZZC NO CHARGE NURSE ONLY: CPT

## 2018-11-05 PROCEDURE — 36416 COLLJ CAPILLARY BLOOD SPEC: CPT

## 2018-11-05 PROCEDURE — 85610 PROTHROMBIN TIME: CPT | Mod: QW

## 2018-11-05 RX ORDER — PANTOPRAZOLE SODIUM 40 MG/1
TABLET, DELAYED RELEASE ORAL
Qty: 180 TABLET | Refills: 0 | Status: SHIPPED | OUTPATIENT
Start: 2018-11-05 | End: 2018-11-25

## 2018-11-05 NOTE — TELEPHONE ENCOUNTER
Routing refill request to provider for review/approval because:  Patient needs to be seen because it has been more than 1 year since last office visit with current provider.      Patient has seen Dr. Ross in 5/9/18, he is no longer with clinic.    Last office visit with Dania Pettit NP 1/23/17.      PANTOPRAZOLE SOD DR 40 MG TAB  Will file in chart as: pantoprazole (PROTONIX) 40 MG EC tablet  TAKE 1 TABLET BY MOUTH TWICE DAILY       Disp: 180 tablet Refills: 0    Class: E-Prescribe Start: 11/2/2018   For: Acute gastric ulcer with hemorrhage  Documented:2 years ago  Last refill:7/29/2018  PPI Protocol [id number] 1:32 AM  X Recent (12 mo) or future (30 days) visit within the authorizing provider's specialty    Not on Clopidogrel (unless Pantoprazole ordered)    No diagnosis of osteoporosis on record    Patient is age 18 or older     Nisha Blanco RN, [facility]

## 2018-11-05 NOTE — PROGRESS NOTES
"  ANTICOAGULATION FOLLOW-UP CLINIC VISIT    Patient Name:  Carlton Bravo  Date:  11/5/2018  Contact Type:  Face to Face    SUBJECTIVE:     Patient Findings     Positives Other complaints    Comments Please review past 5 anticoag encounters - patient has been declining to return to INR clinic sooner than 4 weeks despite out of range INR readings. Patient states that due to his last INR being sub therapeutic - he has ary avoiding his spinach that he typically has on a weekly basis. Patient states he has already taken his 4 mg dose this morning. Patient denies bleeding symptoms/concerns. Discussed bleeding symptoms to monitor for and when/where to seek medical attention.     Patient also declined a veinous draw - however, he will have a \"heap\" of spinach tonight and hold dose tomorrow. Patient is also agreeable to returning in a week and weekly thereafter in order to get INR straightened out.                OBJECTIVE    INR Protime   Date Value Ref Range Status   11/05/2018 5.4 (A) 0.86 - 1.14 Final       ASSESSMENT / PLAN  INR assessment SUPRA    Recheck INR In: 1 WEEK    INR Location Clinic      Anticoagulation Summary as of 11/5/2018     INR goal 2.0-3.0   Today's INR 5.4!   Warfarin maintenance plan 2 mg (2 mg x 1) on Sun, Thu, Sat; 4 mg (2 mg x 2) all other days   Full warfarin instructions 11/6: Hold; Otherwise 2 mg on Sun, Thu, Sat; 4 mg all other days   Weekly warfarin total 22 mg   Plan last modified Annabelle Almaguer, RN (10/8/2018)   Next INR check 11/12/2018   Priority INR   Target end date     Indications   Atrial fibrillation (HCC) [I48.91] [I48.91]  Long-term (current) use of anticoagulants [Z79.01] [Z79.01]         Anticoagulation Episode Summary     INR check location     Preferred lab     Send INR reminders to [facility]  INR    Comments Take in AM. possible chromogenic 10/antiphosholipid syndrome            See the Encounter Report to view Anticoagulation Flowsheet and Dosing Calendar (Go to Encounters tab in " chart review, and find the Anticoagulation Therapy Visit)      Annabelle Almaguer RN

## 2018-11-05 NOTE — MR AVS SNAPSHOT
Carlton Bravo   11/5/2018 8:40 AM   Anticoagulation Therapy Visit    Description:  78 year old male   Provider:  INR [facility]   Department:  [facility]           INR as of 11/5/2018     Today's INR 5.4!      Anticoagulation Summary as of 11/5/2018     INR goal 2.0-3.0   Today's INR 5.4!   Full warfarin instructions 11/6: Hold; Otherwise 2 mg on Sun, Thu, Sat; 4 mg all other days   Next INR check 11/12/2018    Indications   Atrial fibrillation (HCC) [I48.91] [I48.91]  Long-term (current) use of anticoagulants [Z79.01] [Z79.01]         Your next Anticoagulation Clinic appointment(s)     Nov 05, 2018  8:40 AM CST   (Arrive by 8:20 AM)   Anticoagulation Visit with INR [facility]   [facility] [place])    [address] [place] [id number] [phone]            Nov 12, 2018  8:40 AM CST   Anticoagulation Visit with INR [facility]   [facility])    [address] [place] [id number] [phone]              Contact Numbers     [facility]  Please call the anticoagulation nurse at [phone] to cancel and/or reschedule your appointment, or with any problems or questions regarding your therapy.  You may call the main clinic number at [phone] if the nurse is not available.           November 2018 Details    Sun Mon Tue Wed Thu Fri Sat         1               2               3                 4               5      4 mg   See details      6      Hold         7      4 mg         8      2 mg         9      4 mg         10      2 mg           11      2 mg         12            13               14               15               16               17                 18               19               20               21               22               23               24                 25               26               27               28               29               30                 Date Details   11/05 This  INR check       Date of next INR:  11/12/2018         How to take your warfarin dose     To take:  2 mg Take 1 of the 2 mg tablets.    To take:  4 mg Take 2 of the 2 mg tablets.    Hold Do not take your warfarin dose. See the Details table to the right for additional instructions.

## 2018-11-12 ENCOUNTER — TELEPHONE (OUTPATIENT)
Dept: PHARMACY | Facility: CLINIC | Age: 78
End: 2018-11-12

## 2018-11-12 NOTE — TELEPHONE ENCOUNTER
"Routing to PCP to please review and advise.     Patient continues to be non-compliant with INR clinic.     Patient will not return when recommended and gets \"angry\" with staff when suggesting an earlier date.     Patients last INR was on 11/5 and was 5.4 - unexplained. Declined bleeding symptoms and also declined a veinous draw.      Patient was to return to clinic today 11/12 to recheck INR - however, he called this morning to cancel because he reports \"losing his balance in his home this past weekend and falling on his head on the hard floor. Patient reports he has an abrasion to his head and to his elbow. Patient declined going to an UC/ED to have head CT performed to confirm no bleeding is present in brain.     Patient also declined coming into INR clinic this week at all and wont come until next week Monday 11/19.     Patient states he may also decide to not come back at all since he does not feel steady on his toes anymore.     Writer offered to discuss homecare services and also getting medical equipment - such as a walker or cane with Lona Pettit CNP but the patient declined.    Please advise on what INR clinic should be doing if patient will not return to INR clinic when recommended. Especially with recent supratherapeutic INR.     Patients non-compliance has been an ongoing issue for the past 6-8 months.     Annabelle Almaguer RN    "

## 2018-11-13 DIAGNOSIS — I42.9 IDIOPATHIC CARDIOMYOPATHY (H): ICD-10-CM

## 2018-11-13 DIAGNOSIS — I10 HTN, GOAL BELOW 140/90: ICD-10-CM

## 2018-11-13 NOTE — TELEPHONE ENCOUNTER
Please call patient and see if he would consider making appointment at clinic with one of providers  to look at being on med like Xarelto or Eliquis where would not have to have INR checks which may be easier for him   Consulted with Dr Bentley and can be an option for him if insurance coverage is reasonable for him. He could even discuss this with his pharmacy prior if he wanted to.   If renal function okay rivaroxaban or apixaban. If renal function an issue then apixaban. Depending on insurance may cost substantial more than warfarin

## 2018-11-13 NOTE — TELEPHONE ENCOUNTER
"Writer would like to add additional reports from patient.     Patient states that a couple years ago he went into the hospital where a GI bleed was noted and at that time the patient was advised not to take Xarelto or Eliquis due to the possible bleed side effects associated with them. Patient states he was told that Warfarin (coumadin) was the safest for him.     Also, patient reported the following\" I called the [facility] and talked with Dr. Guillaume who is my friend and told him about what is going on and asked if he would be willing to give me a ride to [facility] to have my INR check next week.\"    Writer also huddled with Lona Pettit CNP to discuss safety and overall well-being of the patient with recent falls and hitting head possibly twice in a row now and since the patient does not sound disorientated and is aware of the date, time, etc. And is declining further medical services at the ED/UC, it was agreed that daily wellness checks will be made by writer to assess and reassess patient and document here.     Annabelle Almaguer RN    "

## 2018-11-13 NOTE — TELEPHONE ENCOUNTER
Attempt 1    LM for patient to return clinics call to discuss anticoagulation alternatives and setting up OV with provider. Annabelle Almaguer RN

## 2018-11-13 NOTE — TELEPHONE ENCOUNTER
Spoke with patient, he declined alternative options as such the Xarelto or Eliquis.     Patient also reports that he fell again. He awoke to writers call this morning and found himself on the floor. Patient denies further injuries and also declined going to the ED/EC.     Patient declined homecare at this time also.     Annabelle Almaguer RN

## 2018-11-14 NOTE — TELEPHONE ENCOUNTER
LM with patient to return clinics call to discuss how he is feeling since his 2 recent falls. Annabelle Almaguer RN

## 2018-11-14 NOTE — TELEPHONE ENCOUNTER
Routing to cardiology to please review.     carvedilol (COREG) 6.25 MG tablet 120 tablet 6 5/3/2018  No   Sig: TAKE 2 TABLETS BY MOUTH TWICE A DAY     Last OV with cardiology: 3/21/2018    No future apts.     BP Readings from Last 3 Encounters:   05/09/18 126/84   03/21/18 (!) 150/96   08/02/17 160/90     Beta-Blockers Protocol Azmkxt15/13 4:40 PM   Recent (12 mo) or future (30 days) visit within the authorizing provider's specialty    Blood pressure under 140/90 in past 12 months    Patient is age 6 or older     Annabelle Almaguer RN

## 2018-11-15 NOTE — TELEPHONE ENCOUNTER
"Patient returned clinics call yesterday after hours and LM reporting the following: \"I am gradually healing, and feel I am doing well. \"I plan on being in clinic on Monday 11/19/18 for an INR.\"    Patient also reports that his close friend Reggie Sanchez came over yesterday to discuss a plan for his care moving forward since the patient is hesitant to drive after falling recently. Reggie will be helping the patient to get a smart phone where the [employer] mikayla can be downloaded to and patient will be using this moving forward for his transportation to appointments. Patient also reports that that Reggie will be assisting him with getting set up with meals on wheels.     Patient also requesting an appointment with Lona Pettit CNP to have his RIGHT elbow evaluated as it is swollen, bruised, and pain is radiating from the elbow to the wrist. Writer helped patient to schedule an appointment with Lona Pettit CNP at 10:30 after INR appointment on 11/19/18.     Annabelle Almaguer RN    "

## 2018-11-16 RX ORDER — CARVEDILOL 6.25 MG/1
TABLET ORAL
Qty: 360 TABLET | Refills: 1 | Status: SHIPPED | OUTPATIENT
Start: 2018-11-16 | End: 2019-01-02

## 2018-11-16 NOTE — TELEPHONE ENCOUNTER
"From Dr Bentley visit dated 3/21/18:  \"1) Hypertension - He reports that at home his BP is under much better control. He typically gets readings in the 120s. A repeat manual BP check after 10 minutes of rest was 160/90.      -I discussed 24 hour blood pressure monitor given that he appears to have whitecoat hypertension.  He says he bought a new blood pressure monitor to use at home and it agrees us closely with his old one.  He is not interested in wearing a blood pressure monitor at this time.  Nor does he feel like he needs to be on increased therapy for his blood pressure given his home readings ranging from 110s to the 140s.\"  Will refill RX  KACI Alexis       "

## 2018-11-19 ENCOUNTER — OFFICE VISIT (OUTPATIENT)
Dept: PEDIATRICS | Facility: CLINIC | Age: 78
End: 2018-11-19
Payer: MEDICARE

## 2018-11-19 ENCOUNTER — TELEPHONE (OUTPATIENT)
Dept: PEDIATRICS | Facility: CLINIC | Age: 78
End: 2018-11-19

## 2018-11-19 ENCOUNTER — RADIANT APPOINTMENT (OUTPATIENT)
Dept: CT IMAGING | Facility: CLINIC | Age: 78
End: 2018-11-19
Attending: NURSE PRACTITIONER
Payer: MEDICARE

## 2018-11-19 ENCOUNTER — ANTICOAGULATION THERAPY VISIT (OUTPATIENT)
Dept: PHARMACY | Facility: CLINIC | Age: 78
End: 2018-11-19
Payer: MEDICARE

## 2018-11-19 VITALS
WEIGHT: 202.9 LBS | SYSTOLIC BLOOD PRESSURE: 125 MMHG | TEMPERATURE: 96.7 F | BODY MASS INDEX: 26.05 KG/M2 | HEART RATE: 67 BPM | DIASTOLIC BLOOD PRESSURE: 70 MMHG | OXYGEN SATURATION: 100 %

## 2018-11-19 DIAGNOSIS — I10 HTN, GOAL BELOW 140/90: ICD-10-CM

## 2018-11-19 DIAGNOSIS — S59.901A ELBOW INJURY, RIGHT, INITIAL ENCOUNTER: ICD-10-CM

## 2018-11-19 DIAGNOSIS — R55 SYNCOPE, UNSPECIFIED SYNCOPE TYPE: ICD-10-CM

## 2018-11-19 DIAGNOSIS — I48.91 ATRIAL FIBRILLATION, UNSPECIFIED TYPE (H): Primary | ICD-10-CM

## 2018-11-19 DIAGNOSIS — Z79.01 LONG TERM CURRENT USE OF ANTICOAGULANT THERAPY: ICD-10-CM

## 2018-11-19 DIAGNOSIS — I48.91 ATRIAL FIBRILLATION, UNSPECIFIED TYPE (H): ICD-10-CM

## 2018-11-19 DIAGNOSIS — L03.113 CELLULITIS OF RIGHT ARM: ICD-10-CM

## 2018-11-19 DIAGNOSIS — S09.90XA CLOSED HEAD INJURY, INITIAL ENCOUNTER: ICD-10-CM

## 2018-11-19 LAB
ALBUMIN SERPL-MCNC: 3.2 G/DL (ref 3.4–5)
ALP SERPL-CCNC: 126 U/L (ref 40–150)
ALT SERPL W P-5'-P-CCNC: 39 U/L (ref 0–70)
ANION GAP SERPL CALCULATED.3IONS-SCNC: 11 MMOL/L (ref 3–14)
AST SERPL W P-5'-P-CCNC: 53 U/L (ref 0–45)
BILIRUB SERPL-MCNC: 1.4 MG/DL (ref 0.2–1.3)
BUN SERPL-MCNC: 11 MG/DL (ref 7–30)
CALCIUM SERPL-MCNC: 9 MG/DL (ref 8.5–10.1)
CHLORIDE SERPL-SCNC: 102 MMOL/L (ref 94–109)
CO2 SERPL-SCNC: 28 MMOL/L (ref 20–32)
CREAT SERPL-MCNC: 1.1 MG/DL (ref 0.66–1.25)
CREAT UR-MCNC: 350 MG/DL
ERYTHROCYTE [DISTWIDTH] IN BLOOD BY AUTOMATED COUNT: 17 % (ref 10–15)
GFR SERPL CREATININE-BSD FRML MDRD: 65 ML/MIN/1.7M2
GLUCOSE SERPL-MCNC: 123 MG/DL (ref 70–99)
HCT VFR BLD AUTO: 41 % (ref 40–53)
HGB BLD-MCNC: 13 G/DL (ref 13.3–17.7)
INR POINT OF CARE: 3.7 (ref 0.86–1.14)
MCH RBC QN AUTO: 29.3 PG (ref 26.5–33)
MCHC RBC AUTO-ENTMCNC: 31.7 G/DL (ref 31.5–36.5)
MCV RBC AUTO: 92 FL (ref 78–100)
MICROALBUMIN UR-MCNC: 390 MG/L
MICROALBUMIN/CREAT UR: 111.43 MG/G CR (ref 0–17)
PLATELET # BLD AUTO: 284 10E9/L (ref 150–450)
POTASSIUM SERPL-SCNC: 3.6 MMOL/L (ref 3.4–5.3)
PROT SERPL-MCNC: 7.4 G/DL (ref 6.8–8.8)
RBC # BLD AUTO: 4.44 10E12/L (ref 4.4–5.9)
SODIUM SERPL-SCNC: 141 MMOL/L (ref 133–144)
WBC # BLD AUTO: 6.2 10E9/L (ref 4–11)

## 2018-11-19 PROCEDURE — 85610 PROTHROMBIN TIME: CPT | Mod: QW

## 2018-11-19 PROCEDURE — 80053 COMPREHEN METABOLIC PANEL: CPT | Performed by: NURSE PRACTITIONER

## 2018-11-19 PROCEDURE — 36416 COLLJ CAPILLARY BLOOD SPEC: CPT

## 2018-11-19 PROCEDURE — 70450 CT HEAD/BRAIN W/O DYE: CPT | Performed by: RADIOLOGY

## 2018-11-19 PROCEDURE — 99214 OFFICE O/P EST MOD 30 MIN: CPT | Performed by: NURSE PRACTITIONER

## 2018-11-19 PROCEDURE — 99207 ZZC NO CHARGE NURSE ONLY: CPT

## 2018-11-19 PROCEDURE — 82043 UR ALBUMIN QUANTITATIVE: CPT | Performed by: NURSE PRACTITIONER

## 2018-11-19 PROCEDURE — 85027 COMPLETE CBC AUTOMATED: CPT | Performed by: NURSE PRACTITIONER

## 2018-11-19 RX ORDER — CEPHALEXIN 500 MG/1
500 CAPSULE ORAL 2 TIMES DAILY
Qty: 20 CAPSULE | Refills: 0 | Status: SHIPPED | OUTPATIENT
Start: 2018-11-19 | End: 2019-02-14

## 2018-11-19 NOTE — TELEPHONE ENCOUNTER
Called patient and gave message per Dania Pettit NP.  Patient verbalized understanding and agreement to plan.     Nisha Blanco RN, [facility]

## 2018-11-19 NOTE — PROGRESS NOTES
Please call  Carlton Bravo,    Attached are your test results.  CT scan no bleed  Changes associated with aging and hx of HTN    Please contact us if you have any questions.    Lona Pettit, CNP

## 2018-11-19 NOTE — PATIENT INSTRUCTIONS
PLAN:   1.   Symptomatic therapy suggested: elevation of affected area and call prn if symptoms persist or worsen.  2.  Orders Placed This Encounter   Medications     cephALEXin (KEFLEX) 500 MG capsule     Sig: Take 1 capsule (500 mg) by mouth 2 times daily     Dispense:  20 capsule     Refill:  0     Orders Placed This Encounter   Procedures     CT Head w/o Contrast     Albumin Random Urine Quantitative with Creat Ratio     CBC with platelets     Comprehensive metabolic panel     HOME CARE NURSING REFERRAL       3. Patient needs to follow up in if no improvement,or sooner if worsening of symptoms or other symptoms develop.  FURTHER TESTING:       - CT head   CONSULTATION/REFERRAL to Home health   Will follow up and/or notify patient on results via phone or mail to determine further need for followup    It was a pleasure seeing you today at the [facility]. Thank you for allowing us to care for you today. We truly hope we provided you with the excellent service you deserve. Please let us know if there is anything else we can do for you so we can be sure you are leaving completley satisfied with your care experience.       General information about your clinic   Clinic Hours Lab Hours (Appointments are required)   Mon-Thurs: 7:30 AM - 7 PM Mon-Thurs: 7:30 AM - 7 PM   Fri: 7:30 AM - 5 PM Fri: 7:30 AM - 5 PM        After Hours Nurse Advise & Appts:  [facility]: [phone]  [facility] On Call: to make appointments anytime: [phone] On Call Physician: call [phone] and answering service will page the on call physician.        For urgent appointments, please call [phone] and ask for the triage nurse or your care team clinic nurse.  How to contact my care team:  MyChart: [email or web address]   Phone: [phone]   Fax: [phone]       [facility]:   Phone: [phone]  Hours: 8:00 AM - 6:00 PM  Medication Refills:  Call your pharmacy and they will  forward the refill to us. Please allow 3 business days for your refills to be completed.       Normal or non-critical lab and imaging results will be communicated to you by MyChart, letter or phone within 7 days.  If you do not hear from us within 10 days, please call the clinic. If you have a critical or abnormal lab result, we will notify you by phone as soon as possible.       We now have PWIC (Pediatric Walk in Care)  Monday-Friday from 7:30-4. Simply walk in and be seen for your urgent needs like cough, fever, rash, diarrhea or vomiting, pink eye, UTI. No appointments needed. Ask one of the team for more information      -Your Care Team:    Dr. Walter Amaya - Internal Medicine/Pediatrics   Dr. Maurice Dgugan - Family Medicine  Dr. Sheri Knight - Pediatrics  Dr. Jenelle Lopez - Pediatrics  Lona Pettit CNP - Family Practice Nurse Practitioner

## 2018-11-19 NOTE — MR AVS SNAPSHOT
After Visit Summary   11/19/2018    Carlton Bravo    MRN: [id number]           Patient Information     Date Of Birth          1940        Visit Information        Provider Department      11/19/2018 10:50 AM Lona Pettit APRN CNP [facility]        Today's Diagnoses     Atrial fibrillation, unspecified type (H)    -  1    Elbow injury, right, initial encounter        HTN, goal below 140/90        Long term current use of anticoagulant therapy        Syncope, unspecified syncope type        Closed head injury, initial encounter        Cellulitis of right arm          Care Instructions    PLAN:   1.   Symptomatic therapy suggested: elevation of affected area and call prn if symptoms persist or worsen.  2.  Orders Placed This Encounter   Medications     cephALEXin (KEFLEX) 500 MG capsule     Sig: Take 1 capsule (500 mg) by mouth 2 times daily     Dispense:  20 capsule     Refill:  0     Orders Placed This Encounter   Procedures     CT Head w/o Contrast     Albumin Random Urine Quantitative with Creat Ratio     CBC with platelets     Comprehensive metabolic panel     HOME CARE NURSING REFERRAL       3. Patient needs to follow up in if no improvement,or sooner if worsening of symptoms or other symptoms develop.  FURTHER TESTING:       - CT head   CONSULTATION/REFERRAL to Home health   Will follow up and/or notify patient on results via phone or mail to determine further need for followup    It was a pleasure seeing you today at the [facility]. Thank you for allowing us to care for you today. We truly hope we provided you with the excellent service you deserve. Please let us know if there is anything else we can do for you so we can be sure you are leaving completley satisfied with your care experience.       General information about your clinic   Clinic Hours Lab Hours (Appointments are required)   Mon-Thurs: 7:30 AM - 7 PM Mon-Thurs: 7:30  AM - 7 PM   Fri: 7:30 AM - 5 PM Fri: 7:30 AM - 5 PM        After Hours Nurse Advise & Appts:  [facility] Nurse Advisors: [phone]  [facility] On Call: to make appointments anytime: [phone] On Call Physician: call [phone] and answering service will page the on call physician.        For urgent appointments, please call [phone] and ask for the triage nurse or your care team clinic nurse.  How to contact my care team:  Rich: [email or web address]   Phone: [phone]   Fax: [phone]       [facility]:   Phone: [phone]  Hours: 8:00 AM - 6:00 PM  Medication Refills:  Call your pharmacy and they will forward the refill to us. Please allow 3 business days for your refills to be completed.       Normal or non-critical lab and imaging results will be communicated to you by MyChart, letter or phone within 7 days.  If you do not hear from us within 10 days, please call the clinic. If you have a critical or abnormal lab result, we will notify you by phone as soon as possible.       We now have PWIC (Pediatric Walk in Care)  Monday-Friday from 7:30-4. Simply walk in and be seen for your urgent needs like cough, fever, rash, diarrhea or vomiting, pink eye, UTI. No appointments needed. Ask one of the team for more information      -Your Care Team:    Dr. Walter Amaya - Internal Medicine/Pediatrics   Dr. Maurice Duggan - Family Medicine  Dr. Sheri Knight - Pediatrics  Dr. Jenelle Lopez - Pediatrics  Lona Pettit CNP - Family Practice Nurse Practitioner                           Follow-ups after your visit        Additional Services     HOME CARE NURSING REFERRAL       **Order classes of: [facility], [facility] and [facility] will route to the Home Care and Hospice Referral Pool.  Home Care or Hospice will then contact the patient to schedule their appointment.**    If you do not hear from Home Care and Hospice, or you would like to call to schedule, please call the referring place of service  that your provider has listed below.  ______________________________________________________________________    Your provider has referred you to: [facility]: [facility] [phone]   [email or web address]    Extended Emergency Contact Information  Primary Emergency Contact: Reggie Guillaume   [facility]  Home Phone: [phone]  Work Phone: none  Mobile Phone: [phone]  Relation: Friend  Hearing or visual needs: None  Other needs: None  Preferred language: English   needed? No    Patient Anticipated Discharge Date: 11/19/2018     RN, PT, HHA to begin 24 - 48 hours after discharge.  PLEASE EVALUATE AND TREAT (Evaluation timeline is 24 - 48 hrs. Please call if there is need for a variance to this timeline).    REASON FOR REFERRAL: Assessment & Treatment: PT and RN, Fall Risk Assessment: Assessment to be scheduled and completed within 1-2 weeks and Wound Care - Specialty Treatment Orders: wound care nurse     ADDITIONAL SERVICES NEEDED: SW    OTHER PERTINENT INFORMATION: Patient was last seen by provider on 11/19/2018   for fall and cellulitis right arm .    Current Outpatient Prescriptions:  carvedilol (COREG) 6.25 MG tablet, TAKE 2 TABLETS BY MOUTH TWICE A DAY, Disp: 360 tablet, Rfl: 1  cephALEXin (KEFLEX) 500 MG capsule, Take 1 capsule (500 mg) by mouth 2 times daily, Disp: 20 capsule, Rfl: 0  pantoprazole (PROTONIX) 40 MG EC tablet, TAKE 1 TABLET BY MOUTH TWICE DAILY, Disp: 180 tablet, Rfl: 0  pravastatin (PRAVACHOL) 80 MG tablet, TAKE ONE TABLET BY MOUTH ONCE DAILY, Disp: 90 tablet, Rfl: 2  warfarin (COUMADIN) 2 MG tablet, Take 2 tabs (4 mg) Monday, Wednesday, & Friday, & 1 tab (2mg) all other days or as directed by INR clinic, Disp: 180 tablet, Rfl: 0      Patient Active Problem List:     HTN, goal below 140/90     Idiopathic cardiomyopathy (H)     Dyslipidemia     Hemorrhage of gastrointestinal tract     Atrial fibrillation (HCC)  [I48.91]     Long term current use of anticoagulant therapy     Acute posthemorrhagic anemia     Alcohol abuse     Gastric ulcer with hemorrhage     Gastrointestinal hemorrhage     History of colonic polyps     Benign prostatic hyperplasia with urinary obstruction     Microalbuminuria      Documentation of Face to Face and Certification for Home Health Services    I certify that patient, Carlton Bravo is under my care and that I, or a Nurse Practitioner or Physician's Assistant working with me, had a face-to-face encounter that meets the physician face-to-face encounter requirements with this patient on: 11/19/2018  .    This encounter with the patient was in whole, or in part, for the following medical condition, which is the primary reason for Home Health Care: .    I certify that, based on my findings, the following services are medically necessary Home Health Services: Nursing and Physical Therapy    My clinical findings support the need for the above services because: Nurse is needed: To provide assessment and oversight required in the home to assure adherence to the medical plan due to: concern of recent fall and issues with INR .. and Physical Therapy Services are needed to assess and treat the following functional impairments: concern of balance issues and recent fall .    Further, I certify that my clinical findings support that this patient is homebound (i.e. absences from home require considerable and taxing effort and are for medical reasons or Yaz[employer] services or infrequently or of short duration when for other reasons) because: Leaving home is medically contraindicated for the following reason(s): . and Requires assistance of another person or specialized equipment to access medical services because patient:  Needs help of another to reach medical care .    Based on the above findings, I certify that this patient is confined to the home and needs intermittent skilled nursing care, physical therapy  and/or speech therapy.  The patient is under my care, and I have initiated the establishment of the plan of care.  This patient will be followed by a physician who will periodically review the plan of care.    Physician/Provider to provide follow up care: Lona Pettit certified Physician at time of discharge:     Please be aware that coverage of these services is subject to the terms and limitations of your health insurance plan.  Call member services at your health plan with any benefit or coverage questions.                  Your next 10 appointments already scheduled     Dec 07, 2018  9:40 AM CST   Anticoagulation Visit with INR MG   [facility] ([facility])    [address] [place] [id number] [phone]            Apr 03, 2019 10:00 AM CDT   Return Visit with Dakota Bentley MD   [facility] ([facility])    [address] [place] [id number] [phone]              Future tests that were ordered for you today     Open Future Orders        Priority Expected Expires Ordered    CT Head w/o Contrast Routine  11/19/2019 11/19/2018            Who to contact     If you have questions or need follow up information about today's clinic visit or your schedule please contact [facility] directly at [phone].  Normal or non-critical lab and imaging results will be communicated to you by MyChart, letter or phone within 4 business days after the clinic has received the results. If you do not hear from us within 7 days, please contact the clinic through MyChart or phone. If you have a critical or abnormal lab result, we will notify you by phone as soon as possible.  Submit refill requests through [employer] or call your pharmacy and they will forward the refill request to us. Please allow 3 business days for your refill to be completed.          Additional Information  About Your Visit        Care EveryWhere ID     This is your Care EveryWhere ID. This could be used by other organizations to access your [facility] medical records  [id number]        Your Vitals Were     Pulse Temperature Pulse Oximetry BMI (Body Mass Index)          67 96.7  F (35.9  C) (Temporal) 100% 26.05 kg/m2         Blood Pressure from Last 3 Encounters:   11/19/18 125/70   05/09/18 126/84   03/21/18 (!) 150/96    Weight from Last 3 Encounters:   11/19/18 202 lb 14.4 oz (92 kg)   05/09/18 218 lb 4.8 oz (99 kg)   03/21/18 217 lb 8 oz (98.7 kg)              We Performed the Following     Albumin Random Urine Quantitative with Creat Ratio     CBC with platelets     Comprehensive metabolic panel     HOME CARE NURSING REFERRAL          Today's Medication Changes          These changes are accurate as of 11/19/18 11:24 AM.  If you have any questions, ask your nurse or doctor.               Start taking these medicines.        Dose/Directions    cephALEXin 500 MG capsule   Commonly known as:  KEFLEX   Used for:  Cellulitis of right arm   Started by:  Lona Pettit APRN CNP        Dose:  500 mg   Take 1 capsule (500 mg) by mouth 2 times daily   Quantity:  20 capsule   Refills:  0            Where to get your medicines      These medications were sent to [place] [address] [place] [facility] [address] [facility]  [address] [facility] [address]     Phone:  [phone]     cephALEXin 500 MG capsule                Primary Care Provider Office Phone # Fax #    NI Pearson [phone]       [address]  [place] [address]        Equal Access to Services     [facility] DEBBIE : Hadii nicolette ku hadasho Soabhishekali, waaxda luqadaha, qaybta kaalmada anitayaevelin, laila bryant. So [facility] [phone].    ATENCIÓN: Si habla español, tiene a chen disposición servicios gratuitos de asistencia lingüística. Llame al [phone].    We comply with  applicable federal civil rights laws and [place] laws. We do not discriminate on the basis of race, color, national origin, age, disability, sex, sexual orientation, or gender identity.            Thank you!     Thank you for choosing [facility]  for your care. Our goal is always to provide you with excellent care. Hearing back from our patients is one way we can continue to improve our services. Please take a few minutes to complete the written survey that you may receive in the mail after your visit with us. Thank you!             Your Updated Medication List - Protect others around you: Learn how to safely use, store and throw away your medicines at www.disposemymeds.org.          This list is accurate as of 11/19/18 11:24 AM.  Always use your most recent med list.                   Brand Name Dispense Instructions for use Diagnosis    carvedilol 6.25 MG tablet    COREG    360 tablet    TAKE 2 TABLETS BY MOUTH TWICE A DAY    Idiopathic cardiomyopathy (H), HTN, goal below 140/90       cephALEXin 500 MG capsule    KEFLEX    20 capsule    Take 1 capsule (500 mg) by mouth 2 times daily    Cellulitis of right arm       pantoprazole 40 MG EC tablet    PROTONIX    180 tablet    TAKE 1 TABLET BY MOUTH TWICE DAILY    Acute gastric ulcer with hemorrhage       pravastatin 80 MG tablet    PRAVACHOL    90 tablet    TAKE ONE TABLET BY MOUTH ONCE DAILY    Dyslipidemia       warfarin 2 MG tablet    COUMADIN    180 tablet    Take 2 tabs (4 mg) Monday, Wednesday, & Friday, & 1 tab (2mg) all other days or as directed by INR clinic    Atrial fibrillation (H)

## 2018-11-19 NOTE — PROGRESS NOTES
SUBJECTIVE:   Carlton Bravo is a 78 year old male who presents to clinic today for the following health issues:    Right arm pain Pain    Onset: 1.5 weeks    Description:   Location: right arm  Character: Dull ache    Intensity: moderate, severe    Progression of Symptoms: better    Accompanying Signs & Symptoms:  Other symptoms: swelling and bleeding    History:   Previous similar pain: no       Precipitating factors:   Trauma or overuse: YES- fall    Alleviating factors:  Improved by: nothing    Therapies Tried and outcome: none  Has has 2 falls at home   First fall was about a week and a half ago. Did not trip. Was in side his home. Lost his balance and fell and crashed to the floor and hit right elbow and hit his head on night stand  Did not seek medical help    The next fall was a couple days later  States was asleep and woke up on the floor  Has been drinking alcohol periodically   States a half cup every day     PROBLEMS TO ADD ON...  Hypertension Follow-up      Outpatient blood pressures are not being checked.    Low Salt Diet: no added salt      Problem list and histories reviewed & adjusted, as indicated.  Additional history: as documented    Patient Active Problem List   Diagnosis     HTN, goal below 140/90     Idiopathic cardiomyopathy (H)     Dyslipidemia     Hemorrhage of gastrointestinal tract     Atrial fibrillation (HCC) [I48.91]     Long-term (current) use of anticoagulants [Z79.01]     Acute posthemorrhagic anemia     Alcohol abuse     Gastric ulcer with hemorrhage     Gastrointestinal hemorrhage     History of colonic polyps     Benign prostatic hyperplasia with urinary obstruction     Microalbuminuria     Past Surgical History:   Procedure Laterality Date     KNEE SURGERY  1990    bilateral arthroscopic       Social History   Substance Use Topics     Smoking status: Never Smoker     Smokeless tobacco: Never Used     Alcohol use 0.0 oz/week     0 Standard drinks or equivalent per week       Comment: was drinking about 5 drinks a night but quit 7/2015 due to GI bleed.      Family History   Problem Relation Age of Onset     Myocardial Infarction Other      Cerebrovascular Disease Father      Hypertension No family hx of      Diabetes No family hx of      Colon Cancer No family hx of      Prostate Cancer No family hx of          Current Outpatient Prescriptions   Medication Sig Dispense Refill     carvedilol (COREG) 6.25 MG tablet TAKE 2 TABLETS BY MOUTH TWICE A  tablet 1     pantoprazole (PROTONIX) 40 MG EC tablet TAKE 1 TABLET BY MOUTH TWICE DAILY 180 tablet 0     pravastatin (PRAVACHOL) 80 MG tablet TAKE ONE TABLET BY MOUTH ONCE DAILY 90 tablet 2     warfarin (COUMADIN) 2 MG tablet Take 2 tabs (4 mg) Monday, Wednesday, & Friday, & 1 tab (2mg) all other days or as directed by INR clinic 180 tablet 0     No Known Allergies  BP Readings from Last 3 Encounters:   11/19/18 125/70   05/09/18 126/84   03/21/18 (!) 150/96    Wt Readings from Last 3 Encounters:   11/19/18 202 lb 14.4 oz (92 kg)   05/09/18 218 lb 4.8 oz (99 kg)   03/21/18 217 lb 8 oz (98.7 kg)                  Labs reviewed in EPIC    Reviewed and updated as needed this visit by clinical staff  Tobacco  Allergies  Meds  Med Hx  Surg Hx  Fam Hx  Soc Hx      Reviewed and updated as needed this visit by Provider         ROS:  CONSTITUTIONAL:POSITIVE  for weight loss and NEGATIVE  for chills and fever   ENT/MOUTH: NEGATIVE for ear, mouth and throat problems  RESP:NEGATIVE for significant cough or SOB  CV: NEGATIVE for chest pain, palpitations or peripheral edema  GI: NEGATIVE for nausea, poor appetite and vomiting  MUSCULOSKELETAL: POSITIVE  for right elbow pain  and NEGATIVE for joint swelling  and joint warmth   NEURO: NEGATIVE for weakness, dizziness or paresthesias    OBJECTIVE:     /70 (BP Location: Left arm, Patient Position: Sitting, Cuff Size: Adult Regular)  Pulse 67  Temp 96.7  F (35.9  C) (Temporal)  Wt 202 lb 14.4  oz (92 kg)  SpO2 100%  BMI 26.05 kg/m2  Body mass index is 26.05 kg/(m^2).   Wt Readings from Last 4 Encounters:   11/19/18 202 lb 14.4 oz (92 kg)   05/09/18 218 lb 4.8 oz (99 kg)   03/21/18 217 lb 8 oz (98.7 kg)   08/02/17 231 lb 3.2 oz (104.9 kg)       GENERAL APPEARANCE: no distress  NECK: no adenopathy  RESP: lungs clear to auscultation - no rales, rhonchi or wheezes  CV: regular rates and rhythm and no murmur, click or rub  MS: gait normal and normal muscle tone  gait is age appropriate without ataxia  ORTHO: Elbow exam - right normal, full range of motion, no swelling, bursal effusion, tenderness or deformities, no tenderness of epicondyles.  SKIN: Effected area of skin in red, swollen and tender.  No fluctuance.  No obvious involvement of underlying structures. Abrasion of right forearm with swelling noted from elbow down to wrist   SKIN: Skin color, texture, turgor normal.   NEURO: Normal strength and tone, mentation intact and speech normal  PSYCH: mentation appears normal and affect normal/bright  MENTAL STATUS EXAM:  Appearance/Behavior: No apparent distress, Casually groomed and Dressed appropriately for weather  Speech: Normal  Mood/Affect: normal affect  Insight: Fair    Diagnostic Test Results:  Results for orders placed or performed in visit on 11/19/18   Albumin Random Urine Quantitative with Creat Ratio   Result Value Ref Range    Creatinine Urine 350 mg/dL    Albumin Urine mg/L 390 mg/L    Albumin Urine mg/g Cr 111.43 (H) 0 - 17 mg/g Cr   CBC with platelets   Result Value Ref Range    WBC 6.2 4.0 - 11.0 10e9/L    RBC Count 4.44 4.4 - 5.9 10e12/L    Hemoglobin 13.0 (L) 13.3 - 17.7 g/dL    Hematocrit 41.0 40.0 - 53.0 %    MCV 92 78 - 100 fl    MCH 29.3 26.5 - 33.0 pg    MCHC 31.7 31.5 - 36.5 g/dL    RDW 17.0 (H) 10.0 - 15.0 %    Platelet Count 284 150 - 450 10e9/L   Comprehensive metabolic panel   Result Value Ref Range    Sodium 141 133 - 144 mmol/L    Potassium 3.6 3.4 - 5.3 mmol/L    Chloride  102 94 - 109 mmol/L    Carbon Dioxide 28 20 - 32 mmol/L    Anion Gap 11 3 - 14 mmol/L    Glucose 123 (H) 70 - 99 mg/dL    Urea Nitrogen 11 7 - 30 mg/dL    Creatinine 1.10 0.66 - 1.25 mg/dL    GFR Estimate 65 >60 mL/min/1.7m2    GFR Estimate If Black 78 >60 mL/min/1.7m2    Calcium 9.0 8.5 - 10.1 mg/dL    Bilirubin Total 1.4 (H) 0.2 - 1.3 mg/dL    Albumin 3.2 (L) 3.4 - 5.0 g/dL    Protein Total 7.4 6.8 - 8.8 g/dL    Alkaline Phosphatase 126 40 - 150 U/L    ALT 39 0 - 70 U/L    AST 53 (H) 0 - 45 U/L     Recent Results (from the past 744 hour(s))   CT Head w/o Contrast    Narrative    CT HEAD W/O CONTRAST 11/19/2018 11:45 AM    Provided History: head injury with recent fall and on anticoagulants;  Long term current use of anticoagulant therapy; Syncope, unspecified  syncope type; Closed head injury, initial encounter  ICD-10: Long term current use of anticoagulant therapy; Syncope,  unspecified syncope type; Closed head injury, initial encounter    Comparison: None available.    Technique: Using multidetector thin collimation helical acquisition  technique, axial, coronal and sagittal CT images from the skull base  to the vertex were obtained without intravenous contrast.     Findings:    No intracranial hemorrhage, mass effect, midline shift or abnormal  extra axial fluid collection. Moderate generalized parenchymal volume  loss. Ventricles are not enlarged out of proportion to the cerebral  sulci. Mild/moderate leukoaraiosis. Chronic bilateral basal ganglia  lacunar infarcts. No evidence of acute infarction.    The calvarium is intact. Paranasal sinuses and mastoid air cells are  clear. Debris in the external auditory canals.      Impression    Impression:   1. No acute intracranial pathology.  2. Moderate generalized parenchymal volume loss and mild-to-moderate  chronic small vessel ischemic disease.    I have personally reviewed the examination and initial interpretation  and I agree with the findings.    VERA  MD KIRSTEN       ASSESSMENT/PLAN:     Carlton was seen today for musculoskeletal problem.    Diagnoses and all orders for this visit:    Atrial fibrillation, unspecified type (H)    Elbow injury, right, initial encounter    Cellulitis of right arm  -     cephALEXin (KEFLEX) 500 MG capsule; Take 1 capsule (500 mg) by mouth 2 times daily  -     HOME CARE NURSING REFERRAL    Closed head injury, initial encounter  -     HOME CARE NURSING REFERRAL  -     CT Head w/o Contrast; Future    HTN, goal below 140/90  -     Albumin Random Urine Quantitative with Creat Ratio  -     Comprehensive metabolic panel  HTN Plan:  1)  Medication: continue current medication regimen unchanged  2)  Dietary sodium restriction  Patient Education: Reviewed risks of hypertension and principles of   treatment.    Long term current use of anticoagulant therapy  -     CBC with platelets  -     HOME CARE NURSING REFERRAL  -     CT Head w/o Contrast; Future  Continue current medications.    Syncope, unspecified syncope type  -     HOME CARE NURSING REFERRAL  -     CT Head w/o Contrast; Future  Will follow up and/or notify patient on results via phone or mail to determine further need for followup    PLAN:   1.   Symptomatic therapy suggested: elevation of affected area and call prn if symptoms persist or worsen.  2.  Orders Placed This Encounter   Medications     cephALEXin (KEFLEX) 500 MG capsule     Sig: Take 1 capsule (500 mg) by mouth 2 times daily     Dispense:  20 capsule     Refill:  0     Orders Placed This Encounter   Procedures     CT Head w/o Contrast     Albumin Random Urine Quantitative with Creat Ratio     CBC with platelets     Comprehensive metabolic panel     HOME CARE NURSING REFERRAL       3. Patient needs to follow up in if no improvement,or sooner if worsening of symptoms or other symptoms develop.  FURTHER TESTING:       - CT head   CONSULTATION/REFERRAL to Home health   Will follow up and/or notify patient on results via  phone or mail to determine further need for followup      See Patient Instructions    NI Pearson [facility]

## 2018-11-19 NOTE — NURSING NOTE
"Chief Complaint   Patient presents with     Musculoskeletal Problem     right arm pain, fell 1.5 weeks ago       Initial /70 (BP Location: Left arm, Patient Position: Sitting, Cuff Size: Adult Regular)  Pulse 67  Temp 96.7  F (35.9  C) (Temporal)  Wt 202 lb 14.4 oz (92 kg)  SpO2 100%  BMI 26.05 kg/m2 Estimated body mass index is 26.05 kg/(m^2) as calculated from the following:    Height as of 1/23/17: 6' 2\" (1.88 m).    Weight as of this encounter: 202 lb 14.4 oz (92 kg).  Medication Reconciliation: complete      ZOFIA Ortiz      "

## 2018-11-19 NOTE — TELEPHONE ENCOUNTER
CT Head w/o Contrast   Status:  Final result   Visible to patient:  No (Not Released) Dx:  Closed head injury, initial encounter... Order: [id number]       Notes Recorded by Lona Pettit APRN CNP on 11/19/2018 at 2:23 PM  Please call  Carlton Bravo,    Attached are your test results.  CT scan no bleed  Changes associated with aging and hx of HTN    Please contact us if you have any questions.    Lona Pettit, CNP

## 2018-11-19 NOTE — PROGRESS NOTES
ANTICOAGULATION FOLLOW-UP CLINIC VISIT    Patient Name:  Carlton Bravo  Date:  11/19/2018  Contact Type:  Face to Face with friend Reggie Simon ()    SUBJECTIVE:     Patient Findings     Positives Other complaints    Comments Please note that patient has OV following this INR visit today to discuss the following:  Patient has fallen twice in the past two weeks. Patient has a scabbed over 1x2 inch scab on the LEFT elbow and has a fresh, open, and bleeding wound on the RIGHT elbow that is about the size of a baseball. The patient had applied a dressing 1 week ago at the time of the fall and had never changed the bandage. Writer took sterile water and moistened dressing to remove, there is a foul odor and green/yellow discharge present. Skin is moist and does not appear it was able to build new skin with the neglect to cleanse and change bandage. Applied bacitracin to non-adhesive gauze, wrapped with kerelux. Huddled with Lona Pettit CNP to report finds. RIGHT arm is also +2 pitting edema, most likely from the injury of the elbow. Patient has excellent ROM and does not report any pain with movement. Patients bruising is yellowing and indicative that no hematoma is present.     Patient denies bleeding symptoms/concerns. Discussed bleeding symptoms to monitor for and when/where to seek medical attention.     Patient declined coming any sooner than 2.5 weeks and is agreeable to having a referral today for homecare - Lona Pettit CNP informed. The plan is that homecare will take over INR's hopefully before next appointment.            OBJECTIVE    INR Protime   Date Value Ref Range Status   11/19/2018 3.7 (A) 0.86 - 1.14 Final       ASSESSMENT / PLAN  INR assessment SUPRA    Recheck INR In: 3 WEEKS    INR Location Clinic      Anticoagulation Summary as of 11/19/2018     INR goal 2.0-3.0   Today's INR 3.7!   Warfarin maintenance plan 4 mg (2 mg x 2) on Mon, Fri; 2 mg (2 mg x 1) all other days   Full warfarin  instructions 11/20: Hold; 11/27: 2 mg; 11/28: 2 mg; Otherwise 4 mg on Mon, Fri; 2 mg all other days   Weekly warfarin total 18 mg   Plan last modified Annabelle Almaguer, RN (11/19/2018)   Next INR check 12/7/2018   Priority INR   Target end date     Indications   Atrial fibrillation (HCC) [I48.91] [I48.91]  Long term current use of anticoagulant therapy [Z79.01]         Anticoagulation Episode Summary     INR check location     Preferred lab     Send INR reminders to [facility] INR    Comments Take in AM. possible chromogenic 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

## 2018-11-19 NOTE — MR AVS SNAPSHOT
Carlton Bravo   11/19/2018 10:00 AM   Anticoagulation Therapy Visit    Description:  78 year old male   Provider:  INR MG   Department:  Mg Med Monitoring           INR as of 11/19/2018     Today's INR 3.7!      Anticoagulation Summary as of 11/19/2018     INR goal 2.0-3.0   Today's INR 3.7!   Full warfarin instructions 11/20: Hold; 11/27: 2 mg; 11/28: 2 mg; Otherwise 4 mg on Mon, Fri; 2 mg all other days   Next INR check 12/7/2018    Indications   Atrial fibrillation (HCC) [I48.91] [I48.91]  Long-term (current) use of anticoagulants [Z79.01] [Z79.01]         Your next Anticoagulation Clinic appointment(s)     Dec 07, 2018  9:40 AM CST   [facility]ticoagulation Visit with INR [facility]   [facility]    [address] [place] [id number] [phone]              Contact Numbers     [facility]  Please call the anticoagulation nurse at [phone] to cancel and/or reschedule your appointment, or with any problems or questions regarding your therapy.  You may call the main clinic number at [phone] if the nurse is not available.           November 2018 Details    Sun Mon Tue Wed Thu Fri Sat         1               2               3                 4               5               6               7               8               9               10                 11               12               13               14               15               16               17                 18               19      4 mg   See details      20      Hold         21      2 mg         22      2 mg         23      4 mg         24      2 mg           25      2 mg         26      4 mg         27      2 mg         28      2 mg         29      2 mg         30      4 mg           Date Details   11/19 This INR check               How to take your warfarin dose     To take:  2 mg Take 1 of the 2 mg tablets.    To take:  4 mg Take 2 of the 2 mg tablets.    Hold Do not take your  warfarin dose. See the Details table to the right for additional instructions.                December 2018 Details    Sun Mon Tue Wed Thu Fri Sat           1      2 mg           2      2 mg         3      4 mg         4      2 mg         5      2 mg         6      2 mg         7            8                 9               10               11               12               13               14               15                 16               17               18               19               20               21               22                 23               24               25               26               27               28               29                 30               31                     Date Details   No additional details    Date of next INR:  12/7/2018         How to take your warfarin dose     To take:  2 mg Take 1 of the 2 mg tablets.    To take:  4 mg Take 2 of the 2 mg tablets.

## 2018-11-19 NOTE — LETTER
November 20, 2018      Carlton Bravo                                                                [address]  [place] [address]          Dear Carlton,    The results of your recent  -stable  red blood cell (hgb) levels, normal white blood cell count and normal platelet levels.  -Liver and gallbladder tests (ALT,AST, Alk phos,bilirubin) are significantly elevated. ADVISE: avoid alcohol  -Kidney function (GFR) is normal.  -Sodium is normal.  -Potassium is normal.  -Calcium is normal.    -Microalbumin (urine protein) level is elevated. This is suggestive of early damage to your kidneys from high blood pressure.  ADVISE: avoiding anti-inflamatory agents such as ibuprofen (Advil, Motrin) or naproxen (Aleve) as much as possible, keeping your blood pressure in a normal range,    Results for orders placed or performed in visit on 11/19/18   Albumin Random Urine Quantitative with Creat Ratio   Result Value Ref Range    Creatinine Urine 350 mg/dL    Albumin Urine mg/L 390 mg/L    Albumin Urine mg/g Cr 111.43 (H) 0 - 17 mg/g Cr   CBC with platelets   Result Value Ref Range    WBC 6.2 4.0 - 11.0 10e9/L    RBC Count 4.44 4.4 - 5.9 10e12/L    Hemoglobin 13.0 (L) 13.3 - 17.7 g/dL    Hematocrit 41.0 40.0 - 53.0 %    MCV 92 78 - 100 fl    MCH 29.3 26.5 - 33.0 pg    MCHC 31.7 31.5 - 36.5 g/dL    RDW 17.0 (H) 10.0 - 15.0 %    Platelet Count 284 150 - 450 10e9/L   Comprehensive metabolic panel   Result Value Ref Range    Sodium 141 133 - 144 mmol/L    Potassium 3.6 3.4 - 5.3 mmol/L    Chloride 102 94 - 109 mmol/L    Carbon Dioxide 28 20 - 32 mmol/L    Anion Gap 11 3 - 14 mmol/L    Glucose 123 (H) 70 - 99 mg/dL    Urea Nitrogen 11 7 - 30 mg/dL    Creatinine 1.10 0.66 - 1.25 mg/dL    GFR Estimate 65 >60 mL/min/1.7m2    GFR Estimate If Black 78 >60 mL/min/1.7m2    Calcium 9.0 8.5 - 10.1 mg/dL    Bilirubin Total 1.4 (H) 0.2 - 1.3 mg/dL    Albumin 3.2 (L) 3.4 - 5.0 g/dL    Protein Total 7.4 6.8 - 8.8 g/dL     Alkaline Phosphatase 126 40 - 150 U/L    ALT 39 0 - 70 U/L    AST 53 (H) 0 - 45 U/L       Please make a follow-up appointment if you have additional questions.    Sincerely,       Lona Pettit RN, CNP

## 2018-11-21 ENCOUNTER — ANTICOAGULATION THERAPY VISIT (OUTPATIENT)
Dept: PHARMACY | Facility: CLINIC | Age: 78
End: 2018-11-21
Payer: MEDICARE

## 2018-11-21 DIAGNOSIS — Z79.01 LONG TERM CURRENT USE OF ANTICOAGULANT THERAPY: ICD-10-CM

## 2018-11-21 DIAGNOSIS — I48.91 ATRIAL FIBRILLATION (H): ICD-10-CM

## 2018-11-24 ENCOUNTER — DOCUMENTATION ONLY (OUTPATIENT)
Dept: CARE COORDINATION | Facility: CLINIC | Age: 78
End: 2018-11-24

## 2018-11-24 DIAGNOSIS — K25.0 ACUTE GASTRIC ULCER WITH HEMORRHAGE: ICD-10-CM

## 2018-11-24 DIAGNOSIS — Z87.11 HX OF GASTRIC ULCER: Primary | ICD-10-CM

## 2018-11-25 RX ORDER — PANTOPRAZOLE SODIUM 40 MG/1
40 TABLET, DELAYED RELEASE ORAL DAILY
Qty: 90 TABLET | Refills: 3 | Status: SHIPPED | OUTPATIENT
Start: 2018-11-25 | End: 2019-01-02

## 2018-11-25 NOTE — PROGRESS NOTES
[facility] now requests orders and shares plan of care/discharge summaries for some patients through [employer].  Please REPLY TO THIS MESSAGE OR ROUTE BACK TO THE AUTHOR in order to give authorization for orders when needed.  This is considered a verbal order, you will still receive a faxed copy of orders for signature.  Thank you for your assistance in improving collaboration for our patients.    MD SUMMARY/PLAN OF CARE  Question, Is protonix to be twice a day or just once a day, patient believes just daily however medication list states twice a day, what would you like him to do?

## 2018-11-26 ENCOUNTER — ANTICOAGULATION THERAPY VISIT (OUTPATIENT)
Dept: PHARMACY | Facility: CLINIC | Age: 78
End: 2018-11-26
Payer: MEDICARE

## 2018-11-26 DIAGNOSIS — Z79.01 LONG TERM CURRENT USE OF ANTICOAGULANT THERAPY: ICD-10-CM

## 2018-11-26 DIAGNOSIS — I48.91 ATRIAL FIBRILLATION (H): ICD-10-CM

## 2018-11-26 LAB — INR PPP: 3.6 (ref 0.9–1.1)

## 2018-11-26 PROCEDURE — 99207 ZZC NO CHARGE NURSE ONLY: CPT | Performed by: NURSE PRACTITIONER

## 2018-11-26 NOTE — PROGRESS NOTES
ANTICOAGULATION FOLLOW-UP CLINIC VISIT    Patient Name:  Carlton Bravo  Date:  11/26/2018  Contact Type:  Telephone Spoke with  HC Antonio Shea at [phone], discussed INR result, questions/concerns addressed (see below), dosing instructions with readback, and recommended date of next INR.           SUBJECTIVE:     Patient Findings     Positives Other complaints    Comments 1.) patient reports drinking 1 cup of Vodka straight daily - patient advised of the increased bleeding and fall risks associated with this and verbalized understanding, but states he will continue to drink.   2.) HC ANTONIO Shea, RN reports necrosis in RIGHT elbow wound. WOC RN to assess further, dressings every 2 days. RN visits three times per week at this time.     Patient denies bleeding symptoms/concerns. Discussed bleeding symptoms to monitor for and when/where to seek medical attention.              OBJECTIVE    INR   Date Value Ref Range Status   11/26/2018 3.6 (A) 0.9 - 1.1 Final       ASSESSMENT / PLAN  INR assessment SUPRA    Recheck INR In: 4 DAYS    INR Location Home[facility] INR      Anticoagulation Summary as of 11/26/2018     INR goal 2.0-3.0   Today's INR 3.6!   Warfarin maintenance plan 4 mg (2 mg x 2) on Mon, Fri; 2 mg (2 mg x 1) all other days   Full warfarin instructions 11/27: Hold; 11/28: Hold; 11/29: Hold; Otherwise 4 mg on Mon, Fri; 2 mg all other days   Weekly warfarin total 18 mg   Plan last modified Annabelle Almaguer RN (11/19/2018)   Next INR check 11/30/2018   Priority INR   Target end date     Indications   Atrial fibrillation (HCC) [I48.91] [I48.91]  Long term current use of anticoagulant therapy [Z79.01]         Anticoagulation Episode Summary     INR check location     Preferred lab     Send INR reminders to [facility] INR    Comments Take in AM. possible chromogenic 10/antiphosholipid syndrome            See the Encounter Report to view Anticoagulation Flowsheet and Dosing Calendar (Go to Encounters tab in chart review, and  find the Anticoagulation Therapy Visit)      Annabelle Almaguer RN

## 2018-11-26 NOTE — MR AVS SNAPSHOT
Carlton Bravo   11/26/2018   Anticoagulation Therapy Visit    Description:  78 year old male   Provider:  Lona Pettit APRN CNP   Department:  Mg Med Monitoring           INR as of 11/26/2018     Today's INR 3.6!      Anticoagulation Summary as of 11/26/2018     INR goal 2.0-3.0   Today's INR 3.6!   Full warfarin instructions 11/27: Hold; 11/28: Hold; 11/29: Hold; Otherwise 4 mg on Mon, Fri; 2 mg all other days   Next INR check 11/30/2018    Indications   Atrial fibrillation (HCC) [I48.91] [I48.91]  Long term current use of anticoagulant therapy [Z79.01]         Contact Numbers     [facility]  Please call the anticoagulation nurse at [phone] to cancel and/or reschedule your appointment, or with any problems or questions regarding your therapy.  You may call the main clinic number at [phone] if the nurse is not available.           November 2018 Details    Sun Mon Tue Wed Thu Fri Sat         1               2               3                 4               5               6               7               8               9               10                 11               12               13               14               15               16               17                 18               19               20               21               22               23               24                 25               26      4 mg   See details      27      Hold         28      Hold         29      Hold         30              Date Details   11/26 This INR check       Date of next INR:  11/30/2018         How to take your warfarin dose     To take:  4 mg Take 2 of the 2 mg tablets.    Hold Do not take your warfarin dose. See the Details table to the right for additional instructions.

## 2018-11-28 ENCOUNTER — DOCUMENTATION ONLY (OUTPATIENT)
Dept: CARE COORDINATION | Facility: CLINIC | Age: 78
End: 2018-11-28

## 2018-11-28 NOTE — PROGRESS NOTES
[facility] utilizes an encounter to take the place of a direct phone call to your office. Please take a moment to review the below request. Please reply or route message to author of this encounter.  Message will act as a verbal OK of orders requested below. Thank you.    ORDER  PT contacted pt to set up in home evaluation visit.  Pt strongly refused any type of home PT services and requested to cancel visit.  Vern Sparrow PT  [phone]

## 2018-11-30 ENCOUNTER — ANTICOAGULATION THERAPY VISIT (OUTPATIENT)
Dept: PHARMACY | Facility: CLINIC | Age: 78
End: 2018-11-30
Payer: MEDICARE

## 2018-11-30 DIAGNOSIS — I48.91 ATRIAL FIBRILLATION (H): ICD-10-CM

## 2018-11-30 DIAGNOSIS — Z79.01 LONG TERM CURRENT USE OF ANTICOAGULANT THERAPY: ICD-10-CM

## 2018-11-30 LAB — INR PPP: 1.4 (ref 0.9–1.1)

## 2018-11-30 PROCEDURE — 99207 ZZC NO CHARGE NURSE ONLY: CPT | Performed by: NURSE PRACTITIONER

## 2018-11-30 NOTE — PROGRESS NOTES
ANTICOAGULATION FOLLOW-UP CLINIC VISIT    Patient Name:  Carlton Bravo  Date:  11/30/2018  Contact Type:  Telephone Spoke with PAMELLA Manzanares  Rn [phone], discussed INR result, no questions/concerns, dosing instructions with readback, and recommended date of next INR.       SUBJECTIVE:     Patient Findings     Positives Intentional hold of therapy    Comments Some signs and symptoms of clots include: pain or tenderness in arm or leg, swelling in arm or leg, changes in skin color, or area is warm to touch, shortness or breath, trouble breathing.  Numbness or weakness especially on 1 side of the body, sudden trouble speaking or swallowing, sudden trouble seeing, sudden confusion, dizzy spells or headache.  If you have these please call 911 or seek medical care immediately.             OBJECTIVE    INR   Date Value Ref Range Status   11/30/2018 1.4 (A) 0.9 - 1.1 Final       ASSESSMENT / PLAN  INR assessment SUB    Recheck INR In: 6 DAYS    INR Location [facility]     Anticoagulation Summary as of 11/30/2018     INR goal 2.0-3.0   Today's INR 1.4!   Warfarin maintenance plan 4 mg (2 mg x 2) on Mon, Fri; 2 mg (2 mg x 1) all other days   Full warfarin instructions 4 mg on Mon, Fri; 2 mg all other days   Weekly warfarin total 18 mg   No change documented Annabelle Almaguer RN   Plan last modified Annabelle Almaguer RN (11/19/2018)   Next INR check 12/5/2018   Priority INR   Target end date     Indications   Atrial fibrillation (HCC) [I48.91] [I48.91]  Long term current use of anticoagulant therapy [Z79.01]         Anticoagulation Episode Summary     INR check location     Preferred lab     Send INR reminders to [facility] INR    Comments Take in AM. possible chromogenic 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

## 2018-11-30 NOTE — MR AVS SNAPSHOT
Carlton BAIRES Shelly   11/30/2018   Anticoagulation Therapy Visit    Description:  78 year old male   Provider:  Lona Pettit APRN CNP   Department:  Mg Med Monitoring           INR as of 11/30/2018     Today's INR 1.4!      Anticoagulation Summary as of 11/30/2018     INR goal 2.0-3.0   Today's INR 1.4!   Full warfarin instructions 4 mg on Mon, Fri; 2 mg all other days   Next INR check 12/5/2018    Indications   Atrial fibrillation (HCC) [I48.91] [I48.91]  Long term current use of anticoagulant therapy [Z79.01]         Contact Numbers     [facility]  Please call the anticoagulation nurse at [phone] to cancel and/or reschedule your appointment, or with any problems or questions regarding your therapy.  You may call the main clinic number at [phone] if the nurse is not available.           November 2018 Details    Sun Mon Tue Wed Thu Fri Sat         1               2               3                 4               5               6               7               8               9               10                 11               12               13               14               15               16               17                 18               19               20               21               22               23               24                 25               26               27               28               29               30      4 mg   See details        Date Details   11/30 This INR check               How to take your warfarin dose     To take:  4 mg Take 2 of the 2 mg tablets.           December 2018 Details    Sun Mon Tue Wed Thu Fri Sat           1      2 mg           2      2 mg         3      4 mg         4      2 mg         5            6               7               8                 9               10               11               12               13               14               15                 16               17               18               19                20               21               22                 23               24               25               26               27               28               29                 30               31                     Date Details   No additional details    Date of next INR:  12/5/2018         How to take your warfarin dose     To take:  2 mg Take 1 of the 2 mg tablets.    To take:  4 mg Take 2 of the 2 mg tablets.

## 2018-12-05 ENCOUNTER — ANTICOAGULATION THERAPY VISIT (OUTPATIENT)
Dept: PHARMACY | Facility: CLINIC | Age: 78
End: 2018-12-05
Payer: MEDICARE

## 2018-12-05 ENCOUNTER — DOCUMENTATION ONLY (OUTPATIENT)
Dept: CARE COORDINATION | Facility: CLINIC | Age: 78
End: 2018-12-05

## 2018-12-05 ENCOUNTER — TELEPHONE (OUTPATIENT)
Dept: PEDIATRICS | Facility: CLINIC | Age: 78
End: 2018-12-05

## 2018-12-05 DIAGNOSIS — Z79.01 LONG TERM CURRENT USE OF ANTICOAGULANT THERAPY: ICD-10-CM

## 2018-12-05 DIAGNOSIS — I48.91 ATRIAL FIBRILLATION (H): ICD-10-CM

## 2018-12-05 LAB — INR PPP: 1.6

## 2018-12-05 PROCEDURE — 99207 ZZC NO CHARGE NURSE ONLY: CPT | Performed by: NURSE PRACTITIONER

## 2018-12-05 NOTE — PROGRESS NOTES
[facility] utilizes an encounter to take the place of a direct phone call to your office. Please take a moment to review the below request. Please reply or route message to author of this encounter.  Message will act as a verbal OK of orders requested below. Thank you.    ORDER, request change in plan of care for right elbow skin tear.  Cleanse with soap and water, ns or a no rinse dermal cleanser and dry.  Apply Triad paste to the wound bed, cover with gauze or Telfa nonadherent dressing, secured with roll gauze and tape, change three times weekly and prn for loose or soiled dressing.    MD SUMMARY/PLAN OF CARE  Jovani, my name is Grabiel Maurice, I am a RN woc nurse with [facility] and [facility].  I saw Carlton today and the wound on his right forearm has developed a eschar, but has no signs of infection noted today.  I feel Triad paste would be the best option to debride the eschar and allow the wound to fill in and close.  IF this is acceptable please reply via Epic and I will enter new orders for wound cares.  Thank you  Grabiel Maurice RN [email or web address]

## 2018-12-05 NOTE — PROGRESS NOTES
ANTICOAGULATION FOLLOW-UP CLINIC VISIT    Patient Name:  Carlton Bravo  Date:  12/5/2018  Contact Type:  Telephone Spoke with PAMELLA Spain Rn at [phone], discussed INR result, no questions/concerns, dosing instructions with readback, and recommended date of next INR.           SUBJECTIVE:     Patient Findings     Positives Other complaints    Comments Wound care RN with [facility] Grabiel -  called to report that the patient's RIGHT elbow wound is looking better, but has quite a bit of [place] on it and will require chemical debridement. With that, they requested orders to increase WOC visit from 2x's per week to 3x's per week until 1/22/2019 as he believes this wound will take quite some time to heal after the debridement.     Patient completed Abx.     Intentional hold.     Some signs and symptoms of clots include: pain or tenderness in arm or leg, swelling in arm or leg, changes in skin color, or area is warm to touch, shortness or breath, trouble breathing.  Numbness or weakness especially on 1 side of the body, sudden trouble speaking or swallowing, sudden trouble seeing, sudden confusion, dizzy spells or headache.  If you have these please call 911 or seek medical care immediately.             OBJECTIVE    INR   Date Value Ref Range Status   12/05/2018 1.6  Final       ASSESSMENT / PLAN  INR assessment SUB    Recheck INR In: 4 DAYS    [facility]R Location [facility]R      Anticoagulation Summary as of 12/5/2018     INR goal 2.0-3.0   Today's INR 1.6!   Warfarin maintenance plan 4 mg (2 mg x 2) on Mon, Fri; 2 mg (2 mg x 1) all other days   Full warfarin instructions 4 mg on Mon, Fri; 2 mg all other days   Weekly warfarin total 18 mg   No change documented Annabelle Almaguer, RN   Plan last modified Annabelle Almaguer RN (11/19/2018)   Next INR check 12/10/2018   Priority INR   Target end date     Indications   Atrial fibrillation (HCC) [I48.91] [I48.91]  Long term current use of anticoagulant therapy [Z79.01]          Anticoagulation Episode Summary     INR check location     Preferred lab     Send INR reminders to [facility] INR    Comments Take in AM. possible chromogenic 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

## 2018-12-05 NOTE — MR AVS SNAPSHOT
Carlton BAIRES Shelly   12/5/2018   Anticoagulation Therapy Visit    Description:  78 year old male   Provider:  Lona Pettit APRN CNP   Department:  Mg Med Monitoring           INR as of 12/5/2018     Today's INR 1.6!      Anticoagulation Summary as of 12/5/2018     INR goal 2.0-3.0   Today's INR 1.6!   Full warfarin instructions 4 mg on Mon, Fri; 2 mg all other days   Next INR check 12/10/2018    Indications   Atrial fibrillation (HCC) [I48.91] [I48.91]  Long term current use of anticoagulant therapy [Z79.01]         Description     3x's per week through to 1/22/19      Contact Numbers     [facility]  Please call the anticoagulation nurse at [phone] to cancel and/or reschedule your appointment, or with any problems or questions regarding your therapy.  You may call the main clinic number at [phone] if the nurse is not available.           December 2018 Details    Sun Mon Tue Wed Thu Fri Sat           1                 2               3               4               5      2 mg   See details      6      2 mg         7      4 mg         8      2 mg           9      2 mg         10            11               12               13               14               15                 16               17               18               19               20               21               22                 23               24               25               26               27               28               29                 30               31                     Date Details   12/05 This INR check       Date of next INR:  12/10/2018         How to take your warfarin dose     To take:  2 mg Take 1 of the 2 mg tablets.    To take:  4 mg Take 2 of the 2 mg tablets.

## 2018-12-05 NOTE — TELEPHONE ENCOUNTER
Routing as FYI to PCP.     Grabiel SO - with [facility] called to report that the patient's RIGHT elbow wound is looking better, but has quite a bit of [place] on it and will require chemical debridement. With that, they requested orders to increase WOC visit from 2x's per week to 3x's per week until 1/22/2019 as he believes this wound will take quite some time to heal after the debridement.     Annabelle Almaguer RN

## 2018-12-06 DIAGNOSIS — Z53.9 DIAGNOSIS NOT YET DEFINED: Primary | ICD-10-CM

## 2018-12-06 PROCEDURE — G0180 MD CERTIFICATION HHA PATIENT: HCPCS | Performed by: INTERNAL MEDICINE

## 2018-12-10 ENCOUNTER — ANTICOAGULATION THERAPY VISIT (OUTPATIENT)
Dept: PHARMACY | Facility: CLINIC | Age: 78
End: 2018-12-10
Payer: MEDICARE

## 2018-12-10 DIAGNOSIS — I48.91 ATRIAL FIBRILLATION (H): ICD-10-CM

## 2018-12-10 DIAGNOSIS — Z79.01 LONG TERM CURRENT USE OF ANTICOAGULANT THERAPY: ICD-10-CM

## 2018-12-10 LAB — INR PPP: 1.9

## 2018-12-10 PROCEDURE — 99207 ZZC NO CHARGE NURSE ONLY: CPT | Performed by: NURSE PRACTITIONER

## 2018-12-10 NOTE — PROGRESS NOTES
ANTICOAGULATION FOLLOW-UP CLINIC VISIT    Patient Name:  Carlton Bravo  Date:  12/10/2018  Contact Type:  Telephone Spoke with Glenna MENDOZA Rn and patient, discussed INR result, no questions/concerns, dosing instructions with readback, and recommended date of next INR.           SUBJECTIVE:     Patient Findings     Positives:   No Problem Findings           OBJECTIVE    INR   Date Value Ref Range Status   12/10/2018 1.9  Final       ASSESSMENT / PLAN  INR assessment THER    Recheck INR In: 1 WEEK    INR Location [facility]     Anticoagulation Summary  As of 12/10/2018    INR goal:   2.0-3.0   TTR:   59.9 % (2.9 y)   INR used for dosin.9! (12/10/2018)   Warfarin maintenance plan:   4 mg (2 mg x 2) every Mon, Fri; 2 mg (2 mg x 1) all other days   Full warfarin instructions:   4 mg every Mon, Fri; 2 mg all other days   Weekly warfarin total:   18 mg   No change documented:   Kupfer, Tia, RN   Plan last modified:   Annabelle Almaguer RN (2018)   Next INR check:   2018   Priority:   INR   Target end date:       Indications    Atrial fibrillation (HCC) [I48.91] [I48.91]  Long term current use of anticoagulant therapy [Z79.01]             Anticoagulation Episode Summary     INR check location:       Preferred lab:       Send INR reminders to:   [facility]    Comments:   Take in AM. possible chromogenic 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

## 2018-12-14 ENCOUNTER — TELEPHONE (OUTPATIENT)
Dept: PEDIATRICS | Facility: CLINIC | Age: 78
End: 2018-12-14

## 2018-12-14 NOTE — TELEPHONE ENCOUNTER
[facility] Call Center    Phone Message    May a detailed message be left on voicemail: yes    Reason for Call: Other: Glenna pt's home care nurse wants to update Dr. Amaya that pt fell on Wednesday and has a new wound on his left wrist.   Home care nurse would like orders for: cleansing 3x a week withTriad Paste on it and cover it with non-adherent dressings. Please call Glenna at [phone] with verbal orders.     Action Taken: Message routed to:  Primary Care p [phone]

## 2018-12-14 NOTE — TELEPHONE ENCOUNTER
Called Glenna, gave her message from Dania Pettit NP. She states patient did not think he hit his head on Wednesday.    Called patient and reminded that if he has neurological symptoms after hitting head, to go to the ED or call 911.  He agrees to plan.    Nisha Blanco RN, [facility]

## 2018-12-17 ENCOUNTER — ANTICOAGULATION THERAPY VISIT (OUTPATIENT)
Dept: PHARMACY | Facility: CLINIC | Age: 78
End: 2018-12-17
Payer: MEDICARE

## 2018-12-17 DIAGNOSIS — I48.91 ATRIAL FIBRILLATION (H): ICD-10-CM

## 2018-12-17 DIAGNOSIS — Z79.01 LONG TERM CURRENT USE OF ANTICOAGULANT THERAPY: ICD-10-CM

## 2018-12-17 LAB — INR POINT OF CARE: 2.7 (ref 0.86–1.14)

## 2018-12-17 PROCEDURE — 85610 PROTHROMBIN TIME: CPT | Mod: QW | Performed by: NURSE PRACTITIONER

## 2018-12-17 PROCEDURE — 99207 ZZC NO CHARGE NURSE ONLY: CPT | Performed by: NURSE PRACTITIONER

## 2018-12-17 PROCEDURE — 36416 COLLJ CAPILLARY BLOOD SPEC: CPT | Performed by: NURSE PRACTITIONER

## 2018-12-17 NOTE — PROGRESS NOTES
ANTICOAGULATION FOLLOW-UP CLINIC VISIT    Patient Name:  Carlton Bravo  Date:  2018  Contact Type:  Telephone Spoke with HC Rn, discussed INR result, no questions/concerns, dosing instructions with readback, and recommended date of next INR.           SUBJECTIVE:     Patient Findings     Positives:   No Problem Findings           OBJECTIVE    INR Protime   Date Value Ref Range Status   2018 2.7 (A) 0.86 - 1.14 Final       ASSESSMENT / PLAN  INR assessment THER    Recheck INR In: 4 DAYS    [facility]     Anticoagulation Summary  As of 2018    INR goal:   2.0-3.0   TTR:   60.1 % (3 y)   INR used for dosin.7 (2018)   Warfarin maintenance plan:   4 mg (2 mg x 2) every Mon, Fri; 2 mg (2 mg x 1) all other days   Full warfarin instructions:   4 mg every Mon, Fri; 2 mg all other days   Weekly warfarin total:   18 mg   No change documented:   Kupfer, Tia, RN   Plan last modified:   Annabelle Almaguer RN (2018)   Next INR check:   2018   Priority:   INR   Target end date:       Indications    Atrial fibrillation (HCC) [I48.91] [I48.91]  Long term current use of anticoagulant therapy [Z79.01]             Anticoagulation Episode Summary     INR check location:       Preferred lab:       Send INR reminders to:   [facility]    Comments:   Take in AM. possible chromogenic 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

## 2018-12-21 ENCOUNTER — ANTICOAGULATION THERAPY VISIT (OUTPATIENT)
Dept: PHARMACY | Facility: CLINIC | Age: 78
End: 2018-12-21
Payer: MEDICARE

## 2018-12-21 ENCOUNTER — TELEPHONE (OUTPATIENT)
Dept: PEDIATRICS | Facility: CLINIC | Age: 78
End: 2018-12-21

## 2018-12-21 DIAGNOSIS — I48.91 ATRIAL FIBRILLATION (H): ICD-10-CM

## 2018-12-21 DIAGNOSIS — Z79.01 LONG TERM CURRENT USE OF ANTICOAGULANT THERAPY: ICD-10-CM

## 2018-12-21 LAB — INR PPP: 4.3

## 2018-12-21 PROCEDURE — 99207 ZZC NO CHARGE NURSE ONLY: CPT | Performed by: NURSE PRACTITIONER

## 2018-12-21 NOTE — TELEPHONE ENCOUNTER
"Spoke to Glenna, Home care RN.  She wanted to report that the patient called her and let her know that he fell last night and he would like her to come to his home to \"check him out\" He reports he was \"out for a while\" but does not think he hit his head.   Glenna advised that he go to the hospital if he hit his head or is hurt.  He states he is fine.     She will call us back after she visits patient at 2 or 3 pm.    Nisha Blanco RN, [facility]    "

## 2018-12-21 NOTE — PROGRESS NOTES
ANTICOAGULATION FOLLOW-UP CLINIC VISIT    Patient Name:  Carlton Bravo  Date:  2018  Contact Type:  Telephone    SUBJECTIVE:     Patient Findings     Positives:   Other complaints, Bleed (clinic visit)    Comments:   [facility] Rn Glenna calling to report that she just arrived to the patients home, to find blood all over the floor. Blood all over the patient's face, clothes, and arms. Patient reports having fallen once last weekend and also last night, but cannot recall how or at what time. Patient reports not being able to see out his eyes. Glenna reports that the patients face is black and blue, and that his eyes are nearly swollen shut. Glenna called the EMTs right away and arrived during the call in report for the INR. Instructed for patient to HOLD his dose x2 if he gets DC'd home.            OBJECTIVE    INR   Date Value Ref Range Status   2018 4.3  Final       ASSESSMENT / PLAN  INR assessment SUPRA    Recheck INR In: 1 WEEK    [facility]R [facility]     Anticoagulation Summary  As of 2018    INR goal:   2.0-3.0   TTR:   59.9 % (3 y)   INR used for dosin.3! (2018)   Warfarin maintenance plan:   4 mg (2 mg x 2) every Mon, Fri; 2 mg (2 mg x 1) all other days   Full warfarin instructions:   : Hold; : Hold; Otherwise 4 mg every Mon, Fri; 2 mg all other days   Weekly warfarin total:   18 mg   Plan last modified:   Annabelle Almaguer RN (2018)   Next INR check:   2018   Priority:   INR   Target end date:       Indications    Atrial fibrillation (HCC) [I48.91] [I48.91]  Long term current use of anticoagulant therapy [Z79.01]             Anticoagulation Episode Summary     INR check location:       Preferred lab:       Send INR reminders to:   [facility]    Comments:   Take in AM. possible chromogenic 10/antiphosholipid syndrome            See the Encounter Report to view Anticoagulation Flowsheet and Dosing Calendar (Go to Encounters tab in chart review, and find the  Anticoagulation Therapy Visit)      Annabelle Almaguer RN

## 2018-12-24 ENCOUNTER — TELEPHONE (OUTPATIENT)
Dept: PEDIATRICS | Facility: CLINIC | Age: 78
End: 2018-12-24

## 2018-12-24 NOTE — TELEPHONE ENCOUNTER
Verbal orders given to approve the requested services below per the 'Home Care, Assisted Living, or Nursing Home Evaluations and Treatments Policy'.    Patient is scheduled for a hospital follow up for next week.     Madeline Brown RN   .[facility] ([place], [place])

## 2018-12-24 NOTE — TELEPHONE ENCOUNTER
Per chart review, patient ended up being evaluated in the ED. Please refer to ER notes in Epic.     Called the patient for a hospital follow, patient states he is doing fine. This writer recommend the patient to follow up in clinic for a hospital follow up and he declined. States he will touch base with homecare nurse today and that is all he needs at this time.    Called homecare nurse Glenna and discussed the above. She will be seeing the patient today and will call back with any needs.    Routing to provider as WALE.    Madeline Brown RN   [facility]

## 2018-12-24 NOTE — TELEPHONE ENCOUNTER
[facility] Call Center    Phone Message    May a detailed message be left on voicemail: yes    Reason for Call: Order(s): Home Care Orders: Other: Tabitha is calling for verbal orders for PT and OT eval. Please call [phone].     Action Taken: Message routed to:  Primary Care p [phone]

## 2018-12-28 ENCOUNTER — ANTICOAGULATION THERAPY VISIT (OUTPATIENT)
Dept: PHARMACY | Facility: CLINIC | Age: 78
End: 2018-12-28
Payer: MEDICARE

## 2018-12-28 ENCOUNTER — TELEPHONE (OUTPATIENT)
Dept: PEDIATRICS | Facility: CLINIC | Age: 78
End: 2018-12-28

## 2018-12-28 DIAGNOSIS — Z79.01 LONG TERM CURRENT USE OF ANTICOAGULANT THERAPY: ICD-10-CM

## 2018-12-28 DIAGNOSIS — I48.91 ATRIAL FIBRILLATION (H): ICD-10-CM

## 2018-12-28 LAB — INR PPP: 1.8

## 2018-12-28 PROCEDURE — 99207 ZZC NO CHARGE NURSE ONLY: CPT | Performed by: NURSE PRACTITIONER

## 2018-12-28 NOTE — TELEPHONE ENCOUNTER
[facility] Call Center    Phone Message    May a detailed message be left on voicemail: no    Reason for Call: Other: Home care nurse is with patient.  Has INR result.  Is asking for a call back.      Action Taken: Message routed to:  Primary Care p [phone]

## 2018-12-28 NOTE — PROGRESS NOTES
ANTICOAGULATION FOLLOW-UP CLINIC VISIT    Patient Name:  Carlton Bravo  Date:  2018  Contact Type:  Telephone/ Spoke to both Marcella Manzanares -[phone] and patient    SUBJECTIVE:     Patient Findings     Positives:   Change in diet/appetite, Bruising, Other complaints, Bleed (ER/Hosp visit), Intentional hold of therapy, Activity level change, Missed doses    Comments:   Patient had intentional holds on 18 and 18, missed dose on 18.  Please see notes per anticoagulation note and hospitalization on 18 due to syncope and bleeding from fall.    Spoke to Marcella Manzanares RN, patient is doing better, he will be getting homecare 3 times per week for wound care.   Some other complaints patient has is he has become more sedentary, he is not eating as much due to his appetite has decreased some.    Glenna states patient is still drinking alcohol.  Huddle with Dania Pettit NP.   Would like to keep dosing such to have him therapeutic, but on low end.  Discussed dosing with patient and he agrees to plan.  Nisha Blanco RN, [facility]             OBJECTIVE    INR   Date Value Ref Range Status   2018 1.8  Final       ASSESSMENT / PLAN  INR assessment SUB    Recheck INR In: 3 DAYS    INR Location [facility]      Anticoagulation Summary  As of 2018    INR goal:   2.0-3.0   TTR:   59.8 % (3 y)   INR used for dosin.8! (2018)   Warfarin maintenance plan:   4 mg (2 mg x 2) every Mon, Fri; 2 mg (2 mg x 1) all other days   Full warfarin instructions:   : 2 mg; Otherwise 4 mg every Mon, Fri; 2 mg all other days   Weekly warfarin total:   18 mg   Plan last modified:   Annabelle Almaguer RN (2018)   Next INR check:   2018   Priority:   INR   Target end date:       Indications    Atrial fibrillation (HCC) [I48.91] [I48.91]  Long term current use of anticoagulant therapy [Z79.01]             Anticoagulation Episode Summary     INR check location:        Preferred lab:       Send INR reminders to:   [facility]    Comments:   Take in AM. possible chromogenic 10/antiphosholipid syndrome            See the Encounter Report to view Anticoagulation Flowsheet and Dosing Calendar (Go to Encounters tab in chart review, and find the Anticoagulation Therapy Visit)        Nisha Blanco RN

## 2018-12-28 NOTE — TELEPHONE ENCOUNTER
Please see anticoagulation encounter regarding this result.    Nisha Blanco RN, [facility]

## 2018-12-31 ENCOUNTER — TELEPHONE (OUTPATIENT)
Dept: PEDIATRICS | Facility: CLINIC | Age: 78
End: 2018-12-31

## 2018-12-31 NOTE — TELEPHONE ENCOUNTER
Have him take his usual 4mg today and resume  then 2mg a day and recheck in  2 days   Reinforce again needs to not be drinking alcohol

## 2018-12-31 NOTE — TELEPHONE ENCOUNTER
Called Ramila, Homecare RN.  Gave hre the message per Dania Pettit NP.    Will recheck INR in clinic when here to see Dr. Duggan.    Nisha Blanco RN, [facility]

## 2018-12-31 NOTE — TELEPHONE ENCOUNTER
[facility] Call Center    Phone Message    May a detailed message be left on voicemail: yes    Reason for Call: INR: Caller REPORTING results:  1.6 ---  Home care reports patient has not been eating very well.      Action Taken: Message routed to:  Primary Care p [phone]

## 2019-01-02 ENCOUNTER — DOCUMENTATION ONLY (OUTPATIENT)
Dept: CARE COORDINATION | Facility: CLINIC | Age: 79
End: 2019-01-02

## 2019-01-02 ENCOUNTER — OFFICE VISIT (OUTPATIENT)
Dept: PEDIATRICS | Facility: CLINIC | Age: 79
End: 2019-01-02
Payer: MEDICARE

## 2019-01-02 DIAGNOSIS — E83.51 HYPOCALCEMIA: ICD-10-CM

## 2019-01-02 DIAGNOSIS — E83.42 HYPOMAGNESEMIA: ICD-10-CM

## 2019-01-02 DIAGNOSIS — R55 SYNCOPE, UNSPECIFIED SYNCOPE TYPE: ICD-10-CM

## 2019-01-02 DIAGNOSIS — I48.20 CHRONIC ATRIAL FIBRILLATION (H): ICD-10-CM

## 2019-01-02 DIAGNOSIS — K25.0 ACUTE GASTRIC ULCER WITH HEMORRHAGE: ICD-10-CM

## 2019-01-02 DIAGNOSIS — T14.8XXA ABRASION: ICD-10-CM

## 2019-01-02 DIAGNOSIS — E87.6 HYPOKALEMIA: ICD-10-CM

## 2019-01-02 DIAGNOSIS — Z09 HOSPITAL DISCHARGE FOLLOW-UP: Primary | ICD-10-CM

## 2019-01-02 DIAGNOSIS — I10 HTN, GOAL BELOW 140/90: ICD-10-CM

## 2019-01-02 DIAGNOSIS — H26.8 OTHER CATARACT, UNSPECIFIED LATERALITY: ICD-10-CM

## 2019-01-02 DIAGNOSIS — Z78.9 ALCOHOL USE: ICD-10-CM

## 2019-01-02 DIAGNOSIS — E78.5 DYSLIPIDEMIA: ICD-10-CM

## 2019-01-02 DIAGNOSIS — I42.9 IDIOPATHIC CARDIOMYOPATHY (H): ICD-10-CM

## 2019-01-02 DIAGNOSIS — T14.8XXA SUTURED SKIN WOUND: ICD-10-CM

## 2019-01-02 DIAGNOSIS — Z91.81 PERSONAL HISTORY OF FALL: ICD-10-CM

## 2019-01-02 LAB
ANION GAP SERPL CALCULATED.3IONS-SCNC: 6 MMOL/L (ref 3–14)
BUN SERPL-MCNC: 8 MG/DL (ref 7–30)
CALCIUM SERPL-MCNC: 8.7 MG/DL (ref 8.5–10.1)
CHLORIDE SERPL-SCNC: 107 MMOL/L (ref 94–109)
CO2 SERPL-SCNC: 28 MMOL/L (ref 20–32)
CREAT SERPL-MCNC: 0.93 MG/DL (ref 0.66–1.25)
GFR SERPL CREATININE-BSD FRML MDRD: 78 ML/MIN/{1.73_M2}
GLUCOSE SERPL-MCNC: 102 MG/DL (ref 70–99)
MAGNESIUM SERPL-MCNC: 2 MG/DL (ref 1.6–2.3)
POTASSIUM SERPL-SCNC: 4.5 MMOL/L (ref 3.4–5.3)
SODIUM SERPL-SCNC: 141 MMOL/L (ref 133–144)

## 2019-01-02 PROCEDURE — 80048 BASIC METABOLIC PNL TOTAL CA: CPT | Performed by: FAMILY MEDICINE

## 2019-01-02 PROCEDURE — 99495 TRANSJ CARE MGMT MOD F2F 14D: CPT | Performed by: FAMILY MEDICINE

## 2019-01-02 PROCEDURE — 83735 ASSAY OF MAGNESIUM: CPT | Performed by: FAMILY MEDICINE

## 2019-01-02 PROCEDURE — 36415 COLL VENOUS BLD VENIPUNCTURE: CPT | Performed by: FAMILY MEDICINE

## 2019-01-02 RX ORDER — CARVEDILOL 6.25 MG/1
TABLET ORAL
Qty: 360 TABLET | Refills: 0 | Status: SHIPPED | OUTPATIENT
Start: 2019-01-02 | End: 2019-12-16

## 2019-01-02 RX ORDER — WARFARIN SODIUM 2 MG/1
TABLET ORAL
Qty: 180 TABLET | Refills: 0 | Status: SHIPPED | OUTPATIENT
Start: 2019-01-02 | End: 2019-05-23

## 2019-01-02 RX ORDER — PRAVASTATIN SODIUM 80 MG/1
80 TABLET ORAL DAILY
Qty: 90 TABLET | Refills: 0 | Status: SHIPPED | OUTPATIENT
Start: 2019-01-02 | End: 2019-08-14

## 2019-01-02 RX ORDER — PANTOPRAZOLE SODIUM 40 MG/1
40 TABLET, DELAYED RELEASE ORAL DAILY
Qty: 90 TABLET | Refills: 3 | Status: SHIPPED | OUTPATIENT
Start: 2019-01-02 | End: 2019-02-14

## 2019-01-02 ASSESSMENT — MIFFLIN-ST. JEOR: SCORE: 1706.01

## 2019-01-02 NOTE — PROGRESS NOTES
SUBJECTIVE:   Carlton Bravo is a 78 year old male who presents to clinic today for the following health issues:      SUBJECTIVE:           Hospital Follow-up Visit:  Patient is new to the provider, is here with his friend for follow-up on his recent hospital admission and discharge for having a t mechanical fall leading to your open gapping wound in the left forehead about the left eyebrow few abrasions on both arms.  Patient's past medical history is significant for chronic atrial fibrillation, chronic on chronic anticoagulation with warfarin, hypertension, idiopathic cardiomyopathy, hyperlipidemia and chronic GERD  Patient is also switching mail pharmacies, is requesting this provider to update all refills today  He also was recommended at the ER to follow-up with primary care clinic for rechecking his labs which were low including his potassium calcium and magnesium.  Patient has history of alcohol use-drinks 6 ounce of vodka every day  Patient denies history of chest pain, shortness of breath, palpitations, dizziness, tingling, weakness or numbness of extremities prior to having a fall  He has started using a cane since his hospital discharge.  Patient also has concerns with blurred vision and bilateral cataracts, wishing to see her ophthalmologist in the clinic    Hospital/Nursing Home/IP Rehab Facility: [facility] date of Admission: 12/21,/2018  Date of Discharge: 12/22/2018  Reason(s) for Admission: Mechanical fall, multiple wounds, on anticoagulation            Problems taking medications regularly:  None       Medication changes since discharge: None       Problems adhering to non-medication therapy:  None    Summary of hospitalization:  CareEverywhere information obtained and reviewed  Diagnostic Tests/Treatments reviewed.  Follow up needed: Labs and home care  Other Healthcare Providers Involved in Patient s Care:         Homecare and Low potassium, calcium and magnesium  Update since  discharge: stable.     Post Discharge Medication Reconciliation: discharge medications reconciled, continue medications without change.  Plan of care communicated with patient     Coding guidelines for this visit:  Type of Medical   Decision Making Face-to-Face Visit       within 7 Days of discharge Face-to-Face Visit        within 14 days of discharge   Moderate Complexity [id number]   High Complexity [id number]              Hyperlipidemia Follow-Up      Rate your low fat/cholesterol diet?: not monitoring fat    Taking statin?  Yes, no muscle aches from statin    Other lipid medications/supplements?:  none    Hypertension Follow-up      Outpatient blood pressures are not being checked.    Low Salt Diet: not monitoring salt                               Problem list and histories reviewed & adjusted, as indicated.  Additional history: as documented    Patient Active Problem List   Diagnosis     HTN, goal below 140/90     Idiopathic cardiomyopathy (H)     Dyslipidemia     Hemorrhage of gastrointestinal tract     Atrial fibrillation (HCC) [I48.91]     Long term current use of anticoagulant therapy     Acute posthemorrhagic anemia     Alcohol abuse     Gastric ulcer with hemorrhage     Gastrointestinal hemorrhage     History of colonic polyps     Benign prostatic hyperplasia with urinary obstruction     Microalbuminuria     Hx of gastric ulcer     Past Surgical History:   Procedure Laterality Date     KNEE SURGERY  1990    bilateral arthroscopic       Social History     Tobacco Use     Smoking status: Never Smoker     Smokeless tobacco: Never Used   Substance Use Topics     Alcohol use: Yes     Alcohol/week: 0.0 oz     Comment: was drinking about 5 drinks a night but quit 7/2015 due to GI bleed.      Family History   Problem Relation Age of Onset     Myocardial Infarction Other      Cerebrovascular Disease Father      Hypertension No family hx of      Diabetes No family hx of      Colon Cancer No family hx of       Prostate Cancer No family hx of          Current Outpatient Medications   Medication Sig Dispense Refill     carvedilol (COREG) 6.25 MG tablet TAKE 2 TABLETS BY MOUTH TWICE A  tablet 0     cephALEXin (KEFLEX) 500 MG capsule Take 1 capsule (500 mg) by mouth 2 times daily 20 capsule 0     pantoprazole (PROTONIX) 40 MG EC tablet Take 1 tablet (40 mg) by mouth daily 90 tablet 3     pravastatin (PRAVACHOL) 80 MG tablet Take 1 tablet (80 mg) by mouth daily 90 tablet 0     warfarin (COUMADIN) 2 MG tablet Take 2 tabs (4 mg) Monday, Wednesday, & Friday, & 1 tab (2mg) all other days or as directed by INR clinic 180 tablet 0     No Known Allergies  Recent Labs   Lab Test 11/19/18  1146 05/09/18  0843 03/06/17  0906  10/20/15  0903   A1C  --  5.6  --   --   --    LDL  --  56 79  --  103   HDL  --  94 104  --  56   TRIG  --  80 75  --  99   ALT 39 33 27   < > 31   CR 1.10 0.81 0.92   < > 1.07   GFRESTIMATED 65 >90 80   < > 67   GFRESTBLACK 78 >90 >90   GFR Calc     < > 81   POTASSIUM 3.6 3.5 4.0   < > 4.4   TSH  --   --   --   --  2.51    < > = values in this interval not displayed.      BP Readings from Last 3 Encounters:   01/02/19 (P) 123/81   11/19/18 125/70   05/09/18 126/84    Wt Readings from Last 3 Encounters:   01/02/19 (P) 93.7 kg (206 lb 9.6 oz)   11/19/18 92 kg (202 lb 14.4 oz)   05/09/18 99 kg (218 lb 4.8 oz)                  Labs reviewed in EPIC    Reviewed and updated as needed this visit by clinical staff       Reviewed and updated as needed this visit by Provider         ROS:  CONSTITUTIONAL: NEGATIVE for fever, chills, change in weight  INTEGUMENTARY/SKIN: Skin wounds from fall  EYES: as above  RESP: NEGATIVE for significant cough or SOB  CV: NEGATIVE for chest pain, palpitations or peripheral edema  CV: HTN, ischemic cardiomyopathy  GI: NEGATIVE for nausea, abdominal pain, heartburn, or change in bowel habits  MUSCULOSKELETAL: Using cane for ambulation  NEURO: NEGATIVE for weakness,  "dizziness or paresthesias  ENDOCRINE: NEGATIVE for temperature intolerance, skin/hair changes  HEME/ALLERGY/IMMUNE: NEGATIVE for bleeding problems  PSYCHIATRIC: NEGATIVE for changes in mood or affect    OBJECTIVE:     BP (P) 123/81 (BP Location: Right arm, Patient Position: Sitting, Cuff Size: Adult Regular)   Pulse (P) 54   Temp (P) 97.6  F (36.4  C) (Oral)   Ht (P) 1.854 m (6' 1\")   Wt (P) 93.7 kg (206 lb 9.6 oz)   SpO2 (P) 98%   BMI (P) 27.26 kg/m    Body mass index is 27.26 kg/m  (pended).  GENERAL: healthy, alert and no distress  RESP: lungs clear to auscultation - no rales, rhonchi or wheezes  CV: regular rate and rhythm, normal S1 S2, no S3 or S4, no murmur, click or rub, no peripheral edema and peripheral pulses strong  MS: no gross musculoskeletal defects noted, no edema  MS: Slow gait, using cane for ambulation  SKIN: Sutured linear wound about the left eyebrow  Superficial skin abrasion covered with dressing on the right elbow and left wrist  PSYCH: mentation appears normal, affect normal/bright    Diagnostic Test Results:  none     ASSESSMENT/PLAN:       1. Hospital discharge follow-up  Reviewed documents and reports from care everywhere  Reviewed other reports including echo showing mild to moderate tricuspid regurgitation and left ventricular ejection fraction of 55-60% left knee x-ray showing moderate degenerative arthritis but with no fracture from the fall,, CT of chest, abdomen and pelvis with contrast for trauma screen with no concern for internal bleeding or other organ injury, left hand x-ray with no fracture, left forearm x-ray with no fracture right shoulder x-ray with no fracture or dislocation,, facial CT with no fracture of facial bones, head CT and spine CT of cervical region with no concern for fracture, dislocation, intracranial bleed, normal chest x-ray with no concern for pneumonia or CHF causing his fall, EKG showing low voltage QRS complexes with atrial fibrillation  Patient " had lab results showing very low calcium, magnesium and borderline low potassium  Recommended to continue beta-blockers and chronic anticoagulation for his atrial fibrillation  Continue with home INR therapy  Reviewed fall prevention  Recommended to continue with using assistive devices including cane  Will get labs for recheck today which showed a normal potassium, calcium and magnesium   Home care orders done today to review fall prevention and to assess for falls at home  Results for orders placed or performed in visit on 01/02/19   Basic metabolic panel  (Ca, Cl, CO2, Creat, Gluc, K, Na, BUN)   Result Value Ref Range    Sodium 141 133 - 144 mmol/L    Potassium 4.5 3.4 - 5.3 mmol/L    Chloride 107 94 - 109 mmol/L    Carbon Dioxide 28 20 - 32 mmol/L    Anion Gap 6 3 - 14 mmol/L    Glucose 102 (H) 70 - 99 mg/dL    Urea Nitrogen 8 7 - 30 mg/dL    Creatinine 0.93 0.66 - 1.25 mg/dL    GFR Estimate 78 >60 mL/min/[1.73_m2]    GFR Estimate If Black >90 >60 mL/min/[1.73_m2]    Calcium 8.7 8.5 - 10.1 mg/dL   Magnesium   Result Value Ref Range    Magnesium 2.0 1.6 - 2.3 mg/dL           - Basic metabolic panel  (Ca, Cl, CO2, Creat, Gluc, K, Na, BUN)  - Magnesium  - SUTURE REMOVAL TRAY   HOME CARE         NURSING REFERRAL              2. Syncope, unspecified syncope type  as above    - Basic metabolic panel  (Ca, Cl, CO2, Creat, Gluc, K, Na, BUN)     HOME CARE         NURSING REFERRAL            3. Personal history of fall  as above    - SUTURE REMOVAL TRAY     HOME CARE         NURSING REFERRAL            4. Hypomagnesemia  as above  as above    - Magnesium    5. Hypocalcemia  as above    - Basic metabolic panel  (Ca, Cl, CO2, Creat, Gluc, K, Na, BUN)    6. Sutured skin wound  Sutures were removed today, no concerns for inflammation or infection noted, keep the wound clean and dry  - SUTURE REMOVAL TRAY    7. Hypokalemia  Potassium   Date Value Ref Range Status   01/02/2019 4.5 3.4 - 5.3 mmol/L Final       - Basic metabolic  panel  (Ca, Cl, CO2, Creat, Gluc, K, Na, BUN)    8. Alcohol use  1 Limit alcohol consumption to less than 2 drinks per day (1 drink=5 oz.wine, 12 oz. Beer or 1.5 oz. 80-proof liquor).  Emphasized on limiting alcohol use to 0-1 secondary to underlying history of atrial fibrillation,, age and history of falls   HOME CARE         NURSING REFERRAL              9. Abrasion  Dressing removed, recommended to keep the wounds clean and dry follow-up as needed    10. Other cataract, unspecified laterality  Per patient's request, ophthalmology referral made today  - OPHTHALMOLOGY ADULT REFERRAL    11. Idiopathic cardiomyopathy (H)  Refills given per patient's request  - carvedilol (COREG) 6.25 MG tablet; TAKE 2 TABLETS BY MOUTH TWICE A DAY  Dispense: 360 tablet; Refill: 0    12. HTN, goal below 140/90  BP Readings from Last 6 Encounters:   01/02/19 (P) 123/81   11/19/18 125/70   05/09/18 126/84   03/21/18 (!) 150/96   08/02/17 160/90   01/23/17 122/72       - carvedilol (COREG) 6.25 MG tablet; TAKE 2 TABLETS BY MOUTH TWICE A DAY  Dispense: 360 tablet; Refill: 0    13. Acute gastric ulcer with hemorrhage    - pantoprazole (PROTONIX) 40 MG EC tablet; Take 1 tablet (40 mg) by mouth daily  Dispense: 90 tablet; Refill: 3    14. Dyslipidemia  LDL Cholesterol Calculated   Date Value Ref Range Status   05/09/2018 56 <100 mg/dL Final     Comment:     Desirable:       <100 mg/dl       - pravastatin (PRAVACHOL) 80 MG tablet; Take 1 tablet (80 mg) by mouth daily  Dispense: 90 tablet; Refill: 0    15. Chronic atrial fibrillation (H)  Continue beta-blocker and Coumadin for chronic anticoagulation  - warfarin (COUMADIN) 2 MG tablet; Take 2 tabs (4 mg) Monday, Wednesday, & Friday, & 1 tab (2mg) all other days or as directed by INR clinic  Dispense: 180 tablet; Refill: 0   HOME CARE         NURSING REFERRAL            Chart documentation done in part with Dragon Voice recognition Software. Although reviewed after completion, some word and  grammatical error may remain.  Chart forwarded to PCP for FYI     See Patient Instructions    Maurice Duggan MD  [facility]

## 2019-01-02 NOTE — PATIENT INSTRUCTIONS
Schedule for eye consult    Get the labs today    Start on home care for fall prevention    Avoid high salt foods  1 Limit alcohol consumption to less than 2 drinks per day (1 drink=5 oz.wine, 12 oz. Beer or 1.5 oz. 80-proof liquor).

## 2019-01-02 NOTE — PROGRESS NOTES
Please extened RX for OT home care eval. Pt refused last week and RX  on 19 Pt scheudled for 1/3/19

## 2019-01-03 NOTE — RESULT ENCOUNTER NOTE
Please call Carlton Bravo and inform normal and reassuring test results on potassium, calcium and magnesium.  We will continue with current calcium supplements

## 2019-01-04 ENCOUNTER — TELEPHONE (OUTPATIENT)
Dept: PEDIATRICS | Facility: CLINIC | Age: 79
End: 2019-01-04

## 2019-01-04 NOTE — TELEPHONE ENCOUNTER
Gretchen, Home care RN called and left message on INR office number.  She left message that patient has INR today of 1.7.    Called Gretchen back, left message on voicemail asking for details on anticoagulation questions.    Asked Gretchen to call back the main clinic number.    Nisha Blanco RN, [facility]

## 2019-01-04 NOTE — TELEPHONE ENCOUNTER
Spoke with Gretchen.  She states she had detailed conversation with patient regarding drinking alcohol and the risks with taking with coumadin and falls risk.  He verbalizes understanding but will not stop drinking, he will keep in moderation.    She states his wounds are healing well, he had no missed doses or medication changes, no bleeding or clotting concerns and no illnesses.    He took 4 mg on Monday 12/31/18 and 2 mg rest of the week, or 16 mg in the past week      INR today is 1.7.    Last week INR was 1.8 with 10 mg for the week.    Routing to Dania Pettit NP to advise on dosing and recheck.    Nisha Blanco RN, [facility]

## 2019-01-04 NOTE — TELEPHONE ENCOUNTER
Called Gretchen and gave message per Dania Pettit NP.  Gretchen  verbalized understanding and agreement to plan.  She will call patient to inform.     Nisha Blanco RN, [facility]

## 2019-01-04 NOTE — TELEPHONE ENCOUNTER
Will increase to 4 mg on M, Wed and Friday and then will do 2 mg rest of the days. Recheck INR on Monday

## 2019-01-07 ENCOUNTER — TELEPHONE (OUTPATIENT)
Dept: PEDIATRICS | Facility: CLINIC | Age: 79
End: 2019-01-07

## 2019-01-07 NOTE — TELEPHONE ENCOUNTER
Called the patient and discussed the results and directions as noted below. Per patient, for the last two weeks, he has been taking 4 mg one day of the week and 2 mg of coumadin the rest of the days.    Routing to provider to review and verify coumadin dosing schedule.    Madeline Brown RN   .[facility]

## 2019-01-07 NOTE — TELEPHONE ENCOUNTER
We increased it last week as he was running low   We can do the 4 mg on Monday and Wednesday and recheck in 3 days

## 2019-01-07 NOTE — TELEPHONE ENCOUNTER
[facility] Call Center    Phone Message  From this morning, 1.8     May a detailed message be left on voicemail: yes    Reason for Call: Other: From this morning, 1.8      Action Taken: Message routed to:  Primary Care p [phone]

## 2019-01-07 NOTE — TELEPHONE ENCOUNTER
Continue present dose of 4 mg on Monday, Wed, Friday and rest of days 2 mg and recheck in 2 more days

## 2019-01-08 ENCOUNTER — TELEPHONE (OUTPATIENT)
Dept: OPHTHALMOLOGY | Facility: CLINIC | Age: 79
End: 2019-01-08

## 2019-01-08 ENCOUNTER — OFFICE VISIT (OUTPATIENT)
Dept: OPHTHALMOLOGY | Facility: CLINIC | Age: 79
End: 2019-01-08
Payer: MEDICARE

## 2019-01-08 DIAGNOSIS — H26.9 CATARACTS, BILATERAL: Primary | ICD-10-CM

## 2019-01-08 DIAGNOSIS — H52.4 HYPEROPIA OF RIGHT EYE WITH ASTIGMATISM AND PRESBYOPIA: ICD-10-CM

## 2019-01-08 DIAGNOSIS — H52.01 HYPEROPIA OF RIGHT EYE WITH ASTIGMATISM AND PRESBYOPIA: ICD-10-CM

## 2019-01-08 DIAGNOSIS — H25.13 AGE-RELATED NUCLEAR CATARACT OF BOTH EYES: Primary | ICD-10-CM

## 2019-01-08 DIAGNOSIS — H52.201 HYPEROPIA OF RIGHT EYE WITH ASTIGMATISM AND PRESBYOPIA: ICD-10-CM

## 2019-01-08 PROCEDURE — 92015 DETERMINE REFRACTIVE STATE: CPT | Mod: GY | Performed by: OPHTHALMOLOGY

## 2019-01-08 PROCEDURE — 92136 OPHTHALMIC BIOMETRY: CPT | Performed by: OPHTHALMOLOGY

## 2019-01-08 PROCEDURE — 92004 COMPRE OPH EXAM NEW PT 1/>: CPT | Performed by: OPHTHALMOLOGY

## 2019-01-08 ASSESSMENT — SLIT LAMP EXAM - LIDS
COMMENTS: NORMAL
COMMENTS: NORMAL

## 2019-01-08 ASSESSMENT — VISUAL ACUITY
METHOD: SNELLEN - LINEAR
OD_CC: 20/60
OD_PH_CC: 20/40+
OD_CC: 20/50
OS_CC: CF @ 3'
OD_CC+: -1
OS_PH_CC: 20/200

## 2019-01-08 ASSESSMENT — REFRACTION_MANIFEST
OD_SPHERE: +1.75
OD_AXIS: 031
OD_ADD: +2.75
OD_CYLINDER: +2.75

## 2019-01-08 ASSESSMENT — TONOMETRY
IOP_METHOD: ICARE
OS_IOP_MMHG: 9
OD_IOP_MMHG: 9

## 2019-01-08 ASSESSMENT — REFRACTION_WEARINGRX
OS_AXIS: 166
OD_AXIS: 012
OS_SPHERE: +1.75
OS_CYLINDER: +2.50
OS_AXIS: 015
OD_CYLINDER: +2.50
SPECS_TYPE: SVL
OD_SPHERE: +2.75
OD_CYLINDER: +4.25
OD_AXIS: 178
OS_CYLINDER: +4.00
OS_SPHERE: +5.00
OD_SPHERE: +3.75
SPECS_TYPE: SVL

## 2019-01-08 ASSESSMENT — CONF VISUAL FIELD
OD_SUPERIOR_TEMPORAL_RESTRICTION: 3
OD_SUPERIOR_NASAL_RESTRICTION: 3
OD_INFERIOR_TEMPORAL_RESTRICTION: 3
OD_INFERIOR_NASAL_RESTRICTION: 3
OS_SUPERIOR_TEMPORAL_RESTRICTION: 1
OS_INFERIOR_NASAL_RESTRICTION: 1
OS_SUPERIOR_NASAL_RESTRICTION: 1
OS_INFERIOR_TEMPORAL_RESTRICTION: 1

## 2019-01-08 ASSESSMENT — EXTERNAL EXAM - LEFT EYE: OS_EXAM: NORMAL

## 2019-01-08 ASSESSMENT — EXTERNAL EXAM - RIGHT EYE: OD_EXAM: NORMAL

## 2019-01-08 ASSESSMENT — CUP TO DISC RATIO
OD_RATIO: 0.4
OS_RATIO: 0.4

## 2019-01-08 NOTE — NURSING NOTE
Patient presents with:  Cataract: Patient reports a decrease in VA L>R. Patient is very bothered by not being able to see the computer. Patient has discontinued driving because of decrease in VA.In early 2018, Patient told by [employer] that he has cataracts.      Referring Provider:  Maurice Duggan MD  [address] [place] [address]        Dania Eric, COA

## 2019-01-08 NOTE — PROGRESS NOTES
Assessment & Plan   Carlton Bravo is a 78 year old male who presents with:   Review of systems for the eyes was negative other than the pertinent positives and negatives noted in the HPI.  History is obtained from the patient.    Chief Complaint   Patient presents with     Cataract     Ref by Dr. Duggan, Patient reports a decrease in VA L>R. Patient is very bothered by not being able to see the computer. Patient has discontinued driving because of decrease in VA.In early 2018, Patient told by [employer] that he has cataracts.       Lab Results   Component Value Date    A1C 5.6 05/09/2018         Age-related nuclear cataract of both eyes  - visually significant left eye greater than right eye.  - A's and K's today - results reviewed and appropriate lenses chosen, see scanned documentation.  - Schedule surgery - at [facility]   Discussed lens options. Standard lens monofocal vs monovision. Multifocal vs Toric     Risks, benefits, an alternatives of intended procedure discussed. He wishes to proceed.    Pt would like to proceed with surgery starting with the left eye using a standard lens setting both eyes for distance.    He understands that he may still need to wear glasses after surgery.            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    Documentation for today's encounter was performed by Mami Crow COA. OSC. Acting as a scribe in my presence. I have reviewed and verified that it is an accurate recording of today's encounter.    Attending Physician Attestation:  Complete documentation of historical and exam elements from today's encounter can be found in the full encounter summary report (not reduplicated in this progress note).  I personally obtained the chief complaint(s) and history of present illness.  I confirmed and edited as necessary  the review of systems, past medical/surgical history, family history, social history, and examination findings as documented by others; and I examined the patient myself.  I personally reviewed the relevant tests, images, and reports as documented above.  I formulated and edited as necessary the assessment and plan and discussed the findings and management plan with the patient and family. - Rojas Brown MD

## 2019-01-08 NOTE — TELEPHONE ENCOUNTER
Ramila, Homecare RN called.  Discussed message from Dania Pettit NP.  Ramila was wondering if the next INR check can be further out.      Huddle with Dania Pettit NP, ok to recheck on Monday, 1/14/18 and stick to 4 mg two days per week and 2 mg all other days.    Patient will take the 4 mg on Tuesday and Friday, 2 mg rest of the days, recheck Monday.    They may need to discontinue home care after the next appt.    Nisha Blanco RN, [facility]

## 2019-01-08 NOTE — TELEPHONE ENCOUNTER
"Procedure: left Cataract  Facility: [facility]  Length: 0.5 hour(s)  Surgeon:Anthony Brown MD  Anesthesia: Local with MAC  Diagnosis: Cataract  Out Patient or AM admit:  (Same day)  BMI:Estimated body mass index is 27.26 kg/m  (pended) as calculated from the following:    Height as of 1/2/19: (P) 1.854 m (6' 1\").    Weight as of 1/2/19: (P) 93.7 kg (206 lb 9.6 oz). (If over 43 for general or 45 for MAC cannot be scheduled at [facility])   Pre-op Appointments needed: History & Physical within 30 days of surgery  Post-op appointments needed: Cataract 1 day 1 week 4 weeks   needed:No   Surgery packet/instructions given to patient?:  Yes  Pre op teaching done:  Yes  Lens Orders Needed: Yes: ZCB00 23.5, with incision at  100    Referring provider: Dr. Lofton  Special Considerations:     Procedure: right Cataract  Facility: [facility]  Length: 0.5 hour(s)  Surgeon:Anthony Brown MD  Anesthesia: Local with MAC  Diagnosis: Cataract  Out Patient or AM admit:  (Same day)  BMI:Estimated body mass index is 27.26 kg/m  (pended) as calculated from the following:    Height as of 1/2/19: (P) 1.854 m (6' 1\").    Weight as of 1/2/19: (P) 93.7 kg (206 lb 9.6 oz). (If over 43 for general or 45 for MAC cannot be scheduled at [facility])   Pre-op Appointments needed: History & Physical within 30 days of surgery  Post-op appointments needed: Cataract 1 day 1 week 4 weeks   needed:No   Surgery packet/instructions given to patient?:  Yes  Pre op teaching done:  Yes  Lens Orders Needed: Yes: ZCB00 23.5, with incision at  100    Referring provider: Dr. Lofton  Special Considerations:     TRYPAN BLUE, LAST CASE OF THE DAY.      While meeting with the pt to schedule surgery he stated that he would be a non compliant pt in that he would more than likely not be attending his follow up appts. He had no one to drive him to and from the appts and he was on hospice so that he did not have to come " to the clinic for appts. He also stated that he would not do a pre op H&P with his PCP and that since he was just in to see her last week the H&P could be done off of that visit.

## 2019-01-09 ENCOUNTER — HOSPITAL ENCOUNTER (OUTPATIENT)
Facility: AMBULATORY SURGERY CENTER | Age: 79
End: 2019-01-09
Attending: OPHTHALMOLOGY | Admitting: OPHTHALMOLOGY
Payer: MEDICARE

## 2019-01-11 NOTE — TELEPHONE ENCOUNTER
Date Scheduled: 1-24 and 2-14  Facility: [facility]  Surgeon: Dr. Brown   Post-op appointment scheduled:   Next 5 appointments (look out 90 days)    Jan 25, 2019 11:40 AM CST  Return Visit with Shay Lofton OD  [facility] [id number] [place] [id number]  [phone]   Jan 30, 2019 11:40 AM CST  Return Visit with Shay Lofton OD  [facility] ([facility]) [address] [place] [id number]  [phone]   Feb 15, 2019 12:00 PM CST  Return Visit with Shay Lofton OD  [facility] ([facility]) [id number] [place] [id number]  [phone]   Feb 20, 2019 12:00 PM CST  Return Visit with Shay Lofton OD  [facility]) [address] [place] [id number]  [phone]   Mar 13, 2019 11:40 AM CDT  Return Visit with Shay Lofton OD  [facility]) [address] [place] [id number]  [phone]           scheduled?: No  Surgery packet/instructions confirmed received?  Yes  Special Considerations:   Nicolle Morris, Surgery Scheduling Coordinator

## 2019-01-14 ENCOUNTER — TELEPHONE (OUTPATIENT)
Dept: PEDIATRICS | Facility: CLINIC | Age: 79
End: 2019-01-14

## 2019-01-14 NOTE — TELEPHONE ENCOUNTER
.[facility] Call Center    Phone Message    May a detailed message be left on voicemail: yes    Reason for Call: Medication Refill Request    Has the patient contacted the pharmacy for the refill? Yes   Name of medication being requested: pantoprazole (PROTONIX) 40 MG EC tablet [78360] (Order [id number]  warfarin (COUMADIN) 2 MG tablet [[id number]] (Order [id number])     Provider who prescribed the medication: Dr. Duggan  Pharmacy: [facility]   Date medication is needed: BLADE Linder, home care nurse for pt requested these prescriptions. States she needs Coumadin orders.   Niyah states an INR of 1.5 with no changes and pt took 4mg this morning. Please call [phone] to discuss with Niyah.       Action Taken: Message routed to:  Primary Care p [phone]

## 2019-01-14 NOTE — TELEPHONE ENCOUNTER
Will take 4 mg today and tomorrow and recheck on Thursday   His last appointment with cardiology was last March so he is due for follow up and in the meantime I can reach out and see what cardiology thinks  Needs follow up appointment scheduled with Dr Bentley

## 2019-01-14 NOTE — TELEPHONE ENCOUNTER
Called [facility], they need to have patient call them directly to fill these prescriptions that were received.     Spoke to Ramila, patient has not had any missed doses.  He took 4 mg on Tuesday and Friday and 2 mg all other days.    Today INR is 1.5    Routing to provider to please advise.  Patient is inquiring about Eloquis since INR is not in range lately.    Nisha Blanco RN, [facility]

## 2019-01-14 NOTE — TELEPHONE ENCOUNTER
Clarified with Dania Pettit NP.  Patient should take 2 mg Wednesday and recheck     Called kar Mcgrath detailed message on her phone with Dania Pettit NP response.    Nisha Blanco RN, [facility]

## 2019-01-15 DIAGNOSIS — I48.91 ATRIAL FIBRILLATION (H): ICD-10-CM

## 2019-01-17 ENCOUNTER — TELEPHONE (OUTPATIENT)
Dept: PEDIATRICS | Facility: CLINIC | Age: 79
End: 2019-01-17

## 2019-01-17 NOTE — TELEPHONE ENCOUNTER
Continue dose of 4 mg 3 times a week and 2 mg all other days   Recheck in 2 weeks   Needs follow up appointment with cardiology as last was in March of last year   May can discuss other options at that time

## 2019-01-17 NOTE — TELEPHONE ENCOUNTER
[facility] Call Center    Phone Message    May a detailed message be left on voicemail: yes    Reason for Call: INR: Caller REPORTING results:  2.3 -- Home Care can continue to see patient because patient has [place].      Action Taken: Message routed to:  Primary Care p [phone]

## 2019-01-17 NOTE — TELEPHONE ENCOUNTER
Spoke to [facility] RN:    Patient INR  2.3   INR Goal  2.0-3.0    Patient is taking Warfarin: Last 7 days of dosing:   Th 1/10: 2mg  Fri: 1/11: 4mg  Sat: 1/12: 2mg  Sun: 1/13: 2mg  Mon 1/14: 4mg  Tue 1/15: 4mg  Wed 1/16: 2mg = 20mg    Bleeding or Clotting concerns No    Missed doses or extra doses No    Recent illnesses or infections No    New medications or discontinue of medications No    Dietary or supplement/vitamin changes No    Please review and advise on Warfarin dosing and next INR recheck.    Sabine Galloway RN

## 2019-01-17 NOTE — TELEPHONE ENCOUNTER
Called Ramila with [facility] care and left detailed message on confidential voicemail. Instructed to have patient take 4mg Monday, Wed, Friday; 2 mg all other days. Recheck INR in 2 weeks.     Needs to see Cardiology.   Call with questions.    Sabine Galloway RN

## 2019-01-18 RX ORDER — WARFARIN SODIUM 2 MG/1
TABLET ORAL
Qty: 180 TABLET | Refills: 0 | Status: SHIPPED | OUTPATIENT
Start: 2019-01-18 | End: 2019-01-22

## 2019-01-18 NOTE — TELEPHONE ENCOUNTER
Refilled per protocol.    Madeline Brown RN   .[facility], [facility]

## 2019-01-22 ENCOUNTER — DOCUMENTATION ONLY (OUTPATIENT)
Dept: CARE COORDINATION | Facility: CLINIC | Age: 79
End: 2019-01-22

## 2019-01-22 DIAGNOSIS — I48.20 CHRONIC ATRIAL FIBRILLATION (H): ICD-10-CM

## 2019-01-22 RX ORDER — WARFARIN SODIUM 2 MG/1
TABLET ORAL
Qty: 180 TABLET | Refills: 0 | Status: SHIPPED | OUTPATIENT
Start: 2019-01-22 | End: 2020-02-25

## 2019-01-22 NOTE — PROGRESS NOTES
[facility] now requests orders and shares plan of care/discharge summaries for some patients through [employer].  Please REPLY TO THIS MESSAGE OR ROUTE BACK TO THE AUTHOR in order to give authorization for orders when needed.  This is considered a verbal order, you will still receive a faxed copy of orders for signature.  Thank you for your assistance in improving collaboration for our patients.    ORDER    1. SN 1w9 and 3 PRN    2. Pt also needs Coumadin Script sent to  pharmacy mail services. Fax number [phone] or number [phone].  Please note, going forward all medication scripts should be sent here.    Thank you!            MD SUMMARY/PLAN OF CARE    SUMMARY TO MD  78 yr old Male being recertified for home care services for ongoing coordination of care and INR/labs. Over past 60 days, Pt has remains stable, did have one ER visit d/t fall and laceration to forehead. Other than that no other changes in health.  Ptt reports it being very taxing to leave home and difficult to get to clinic for INR checks. Was previously under Medicare episode related to multiple falls and wound. Since then wounds have healed and has INR has been. Therefore will continue to see pt under medical assistance for INR and any other  coronation of care neds. A/o x5, LS CTA, denies any pain or history of pain. Denies any issue with B&B status. Appetite reported to be fair, eating two meals a day.  Habit of getting late up later in AM therefore has more of a brunch and then an early supper. Patient continues to drink a   c to 3/4 cup of alcohol each day.  Education has been provided on the risks drinking , such as increase fall risk, increase for  bleeding, and effect it has on coumadin. Patient continues to wish to drink therefore education was provided on trying to keep alcohol consumption equal each day, so alcohol does not interfere with Coumadin as much. Pt verbalized understanding. Denies any issues with anxiety  or depression.   IADL assistance is provided by delivery systems such as [employer] deliveries groceries and now currently [facility] deliveries medications. If patient is in need of leaving home,  has close friend that assist with tiffany rea/ MD appointments. Medication reconciliation was completed patient is knowledgeable of medication dosing, frequency, route and purpose of all meds. Called [facility]  mail delivery pharmacy to verify shipment of meds. Verbalized by the pharmacist assist that meds would be delivered within the next few days. Only concern was there was no script for Coumadin will contact primary M.D. and get Coumadin script sent over to new pharmacy.   Background pt was previously under MDCR episode for wound care s/t multiple falls, since then gait/mobility has improved and wounds have healed. Pt has limited support and is taxing for him to leave home therefore request home care team to continue with services.  Analysis Pt is at risk for hospitalization r/t Chronic disease mgmt.,  hx of falls/fall risk, ETOH consumption on a daily basis and  dependent on others for IADL needs.  Recommendation for Coordination of care, INRs, and vulnerability assessment d/t ETOH consumption.     Estimated Length of Home Care Need 9 weeks

## 2019-01-23 NOTE — PROGRESS NOTES
Please give verbal order to continue SN as recommended below   Script sent     Please have him make follow up with cardiology as last visit was March 2018

## 2019-01-31 ENCOUNTER — TELEPHONE (OUTPATIENT)
Dept: PEDIATRICS | Facility: CLINIC | Age: 79
End: 2019-01-31

## 2019-01-31 NOTE — TELEPHONE ENCOUNTER
.[facility] Call Center    Phone Message    May a detailed message be left on voicemail: yes    Reason for Call: INR: Caller REPORTING results:  Leeanna is repoting INR results for pt 2.7 no changes in medication or health    Action Taken: Message routed to:  Primary Care p [phone]

## 2019-01-31 NOTE — TELEPHONE ENCOUNTER
1/17 encounter states: Instructed to have patient take 4mg Monday, Wed, Friday; 2 mg all other days.   Tiana Joseph RN

## 2019-02-01 NOTE — TELEPHONE ENCOUNTER
Called Leeanna and gave message per Dania Pettit NP.   Leeanna verbalized understanding and agreement to plan.     Nisah Blanco RN, [facility]

## 2019-02-03 DIAGNOSIS — K25.0 ACUTE GASTRIC ULCER WITH HEMORRHAGE: ICD-10-CM

## 2019-02-05 RX ORDER — PANTOPRAZOLE SODIUM 40 MG/1
TABLET, DELAYED RELEASE ORAL
Qty: 180 TABLET | Refills: 3 | Status: SHIPPED | OUTPATIENT
Start: 2019-02-05 | End: 2020-01-08

## 2019-02-08 RX ORDER — OFLOXACIN 3 MG/ML
1 SOLUTION/ DROPS OPHTHALMIC 4 TIMES DAILY
Qty: 1 BOTTLE | Refills: 1 | Status: SHIPPED | OUTPATIENT
Start: 2019-02-08 | End: 2019-02-19

## 2019-02-08 RX ORDER — PREDNISOLONE ACETATE 10 MG/ML
1 SUSPENSION/ DROPS OPHTHALMIC 4 TIMES DAILY
Qty: 1 BOTTLE | Refills: 1 | Status: SHIPPED | OUTPATIENT
Start: 2019-02-08 | End: 2019-02-19

## 2019-02-08 RX ORDER — KETOROLAC TROMETHAMINE 5 MG/ML
1 SOLUTION OPHTHALMIC 4 TIMES DAILY
Qty: 1 BOTTLE | Refills: 1 | Status: SHIPPED | OUTPATIENT
Start: 2019-02-08 | End: 2019-02-11

## 2019-02-08 NOTE — TELEPHONE ENCOUNTER
IOL calcs and orders emailed to [facility]. Medication orders sent to [facility].  Sabine Bucio RN

## 2019-02-11 DIAGNOSIS — H26.9 CATARACTS, BILATERAL: ICD-10-CM

## 2019-02-11 RX ORDER — KETOROLAC TROMETHAMINE 5 MG/ML
1 SOLUTION OPHTHALMIC 4 TIMES DAILY
Qty: 1 BOTTLE | Refills: 1 | Status: SHIPPED | OUTPATIENT
Start: 2019-02-11 | End: 2019-02-19

## 2019-02-11 NOTE — TELEPHONE ENCOUNTER
Phone call to pt with reminder to start eye drops in the left eye tomorrow, in preparation for cataract surgery on Thursday.  Pt verbalized understanding, states he did not receive Ketorolac eye drops, the pharmacy is backordered.  Ketorolac prescription sent to [facility] pharmacy, pt aware to  prescription on Thursday.  Sabine Bucio RN

## 2019-02-12 DIAGNOSIS — Z53.9 DIAGNOSIS NOT YET DEFINED: Primary | ICD-10-CM

## 2019-02-12 PROCEDURE — G0179 MD RECERTIFICATION HHA PT: HCPCS | Performed by: INTERNAL MEDICINE

## 2019-02-13 ENCOUNTER — ANESTHESIA EVENT (OUTPATIENT)
Dept: SURGERY | Facility: AMBULATORY SURGERY CENTER | Age: 79
End: 2019-02-13

## 2019-02-14 ENCOUNTER — ANESTHESIA (OUTPATIENT)
Dept: SURGERY | Facility: AMBULATORY SURGERY CENTER | Age: 79
End: 2019-02-14

## 2019-02-14 ENCOUNTER — OFFICE VISIT (OUTPATIENT)
Dept: PEDIATRICS | Facility: CLINIC | Age: 79
End: 2019-02-14
Payer: MEDICARE

## 2019-02-14 ENCOUNTER — HOSPITAL ENCOUNTER (OUTPATIENT)
Facility: AMBULATORY SURGERY CENTER | Age: 79
Discharge: HOME | End: 2019-02-14
Attending: OPHTHALMOLOGY | Admitting: OPHTHALMOLOGY
Payer: MEDICARE

## 2019-02-14 VITALS
RESPIRATION RATE: 16 BRPM | SYSTOLIC BLOOD PRESSURE: 146 MMHG | DIASTOLIC BLOOD PRESSURE: 70 MMHG | TEMPERATURE: 97.2 F | OXYGEN SATURATION: 98 % | HEART RATE: 74 BPM

## 2019-02-14 VITALS
SYSTOLIC BLOOD PRESSURE: 120 MMHG | TEMPERATURE: 96.7 F | BODY MASS INDEX: 27.67 KG/M2 | DIASTOLIC BLOOD PRESSURE: 70 MMHG | OXYGEN SATURATION: 96 % | HEART RATE: 56 BPM | WEIGHT: 208.8 LBS | HEIGHT: 73 IN

## 2019-02-14 DIAGNOSIS — Z01.818 PREOP GENERAL PHYSICAL EXAM: Primary | ICD-10-CM

## 2019-02-14 DIAGNOSIS — H26.9 CATARACT OF BOTH EYES, UNSPECIFIED CATARACT TYPE: ICD-10-CM

## 2019-02-14 LAB — INR BLD: 3.3 (ref 0.86–1.14)

## 2019-02-14 PROCEDURE — 99214 OFFICE O/P EST MOD 30 MIN: CPT | Performed by: NURSE PRACTITIONER

## 2019-02-14 PROCEDURE — 36416 COLLJ CAPILLARY BLOOD SPEC: CPT | Performed by: ANESTHESIOLOGY

## 2019-02-14 PROCEDURE — 85610 PROTHROMBIN TIME: CPT | Mod: QW | Performed by: ANESTHESIOLOGY

## 2019-02-14 PROCEDURE — 66984 XCAPSL CTRC RMVL W/O ECP: CPT | Mod: LT

## 2019-02-14 PROCEDURE — G8918 PT W/O PREOP ORDER IV AB PRO: HCPCS

## 2019-02-14 PROCEDURE — G8907 PT DOC NO EVENTS ON DISCHARG: HCPCS

## 2019-02-14 DEVICE — EYE IMP IOL AMO PCL TECNIS ZCB00 23.5: Type: IMPLANTABLE DEVICE | Site: EYE | Status: FUNCTIONAL

## 2019-02-14 RX ORDER — LIDOCAINE 40 MG/G
CREAM TOPICAL
Status: DISCONTINUED | OUTPATIENT
Start: 2019-02-14 | End: 2019-02-15 | Stop reason: HOSPADM

## 2019-02-14 RX ORDER — FENTANYL CITRATE 50 UG/ML
25-50 INJECTION, SOLUTION INTRAMUSCULAR; INTRAVENOUS
Status: DISCONTINUED | OUTPATIENT
Start: 2019-02-14 | End: 2019-02-15 | Stop reason: HOSPADM

## 2019-02-14 RX ORDER — OXYCODONE HYDROCHLORIDE 5 MG/1
5-10 TABLET ORAL EVERY 4 HOURS PRN
Status: DISCONTINUED | OUTPATIENT
Start: 2019-02-14 | End: 2019-02-15 | Stop reason: HOSPADM

## 2019-02-14 RX ORDER — ACETAMINOPHEN 325 MG/1
975 TABLET ORAL ONCE
Status: COMPLETED | OUTPATIENT
Start: 2019-02-14 | End: 2019-02-14

## 2019-02-14 RX ORDER — MOXIFLOXACIN 5 MG/ML
SOLUTION/ DROPS OPHTHALMIC PRN
Status: DISCONTINUED | OUTPATIENT
Start: 2019-02-14 | End: 2019-02-14 | Stop reason: HOSPADM

## 2019-02-14 RX ORDER — TETRACAINE HYDROCHLORIDE 5 MG/ML
SOLUTION OPHTHALMIC PRN
Status: DISCONTINUED | OUTPATIENT
Start: 2019-02-14 | End: 2019-02-14 | Stop reason: HOSPADM

## 2019-02-14 RX ORDER — HYDROMORPHONE HYDROCHLORIDE 1 MG/ML
.3-.5 INJECTION, SOLUTION INTRAMUSCULAR; INTRAVENOUS; SUBCUTANEOUS EVERY 10 MIN PRN
Status: DISCONTINUED | OUTPATIENT
Start: 2019-02-14 | End: 2019-02-15 | Stop reason: HOSPADM

## 2019-02-14 RX ORDER — ALBUTEROL SULFATE 0.83 MG/ML
2.5 SOLUTION RESPIRATORY (INHALATION) EVERY 4 HOURS PRN
Status: DISCONTINUED | OUTPATIENT
Start: 2019-02-14 | End: 2019-02-15 | Stop reason: HOSPADM

## 2019-02-14 RX ORDER — NALOXONE HYDROCHLORIDE 0.4 MG/ML
.1-.4 INJECTION, SOLUTION INTRAMUSCULAR; INTRAVENOUS; SUBCUTANEOUS
Status: DISCONTINUED | OUTPATIENT
Start: 2019-02-14 | End: 2019-02-15 | Stop reason: HOSPADM

## 2019-02-14 RX ORDER — ONDANSETRON 2 MG/ML
4 INJECTION INTRAMUSCULAR; INTRAVENOUS EVERY 30 MIN PRN
Status: DISCONTINUED | OUTPATIENT
Start: 2019-02-14 | End: 2019-02-15 | Stop reason: HOSPADM

## 2019-02-14 RX ORDER — KETOROLAC TROMETHAMINE 5 MG/ML
1 SOLUTION OPHTHALMIC
Status: COMPLETED | OUTPATIENT
Start: 2019-02-14 | End: 2019-02-14

## 2019-02-14 RX ORDER — ONDANSETRON 4 MG/1
4 TABLET, ORALLY DISINTEGRATING ORAL EVERY 30 MIN PRN
Status: DISCONTINUED | OUTPATIENT
Start: 2019-02-14 | End: 2019-02-15 | Stop reason: HOSPADM

## 2019-02-14 RX ORDER — PHENYLEPHRINE HYDROCHLORIDE 25 MG/ML
1 SOLUTION/ DROPS OPHTHALMIC
Status: COMPLETED | OUTPATIENT
Start: 2019-02-14 | End: 2019-02-14

## 2019-02-14 RX ORDER — CYCLOPENTOLATE HYDROCHLORIDE 10 MG/ML
1 SOLUTION/ DROPS OPHTHALMIC
Status: COMPLETED | OUTPATIENT
Start: 2019-02-14 | End: 2019-02-14

## 2019-02-14 RX ORDER — DEXAMETHASONE SODIUM PHOSPHATE 4 MG/ML
4 INJECTION, SOLUTION INTRA-ARTICULAR; INTRALESIONAL; INTRAMUSCULAR; INTRAVENOUS; SOFT TISSUE EVERY 10 MIN PRN
Status: DISCONTINUED | OUTPATIENT
Start: 2019-02-14 | End: 2019-02-15 | Stop reason: HOSPADM

## 2019-02-14 RX ORDER — SODIUM CHLORIDE, SODIUM LACTATE, POTASSIUM CHLORIDE, CALCIUM CHLORIDE 600; 310; 30; 20 MG/100ML; MG/100ML; MG/100ML; MG/100ML
500 INJECTION, SOLUTION INTRAVENOUS CONTINUOUS
Status: DISCONTINUED | OUTPATIENT
Start: 2019-02-14 | End: 2019-02-15 | Stop reason: HOSPADM

## 2019-02-14 RX ORDER — MEPERIDINE HYDROCHLORIDE 25 MG/ML
12.5 INJECTION INTRAMUSCULAR; INTRAVENOUS; SUBCUTANEOUS
Status: DISCONTINUED | OUTPATIENT
Start: 2019-02-14 | End: 2019-02-15 | Stop reason: HOSPADM

## 2019-02-14 RX ORDER — PROPARACAINE HYDROCHLORIDE 5 MG/ML
1 SOLUTION/ DROPS OPHTHALMIC
Status: COMPLETED | OUTPATIENT
Start: 2019-02-14 | End: 2019-02-14

## 2019-02-14 RX ORDER — FENTANYL CITRATE 50 UG/ML
INJECTION, SOLUTION INTRAMUSCULAR; INTRAVENOUS PRN
Status: DISCONTINUED | OUTPATIENT
Start: 2019-02-14 | End: 2019-02-14

## 2019-02-14 RX ORDER — MOXIFLOXACIN 5 MG/ML
1 SOLUTION/ DROPS OPHTHALMIC
Status: COMPLETED | OUTPATIENT
Start: 2019-02-14 | End: 2019-02-14

## 2019-02-14 RX ORDER — IBUPROFEN 400 MG/1
400 TABLET, FILM COATED ORAL ONCE
Status: COMPLETED | OUTPATIENT
Start: 2019-02-14 | End: 2019-02-14

## 2019-02-14 RX ORDER — SODIUM CHLORIDE, SODIUM LACTATE, POTASSIUM CHLORIDE, CALCIUM CHLORIDE 600; 310; 30; 20 MG/100ML; MG/100ML; MG/100ML; MG/100ML
INJECTION, SOLUTION INTRAVENOUS CONTINUOUS
Status: DISCONTINUED | OUTPATIENT
Start: 2019-02-14 | End: 2019-02-15 | Stop reason: HOSPADM

## 2019-02-14 RX ADMIN — FENTANYL CITRATE 25 MCG: 50 INJECTION, SOLUTION INTRAMUSCULAR; INTRAVENOUS at 10:32

## 2019-02-14 RX ADMIN — MOXIFLOXACIN 1 DROP: 5 SOLUTION/ DROPS OPHTHALMIC at 09:34

## 2019-02-14 RX ADMIN — FENTANYL CITRATE 25 MCG: 50 INJECTION, SOLUTION INTRAMUSCULAR; INTRAVENOUS at 10:43

## 2019-02-14 RX ADMIN — SODIUM CHLORIDE, SODIUM LACTATE, POTASSIUM CHLORIDE, CALCIUM CHLORIDE 500 ML: 600; 310; 30; 20 INJECTION, SOLUTION INTRAVENOUS at 09:47

## 2019-02-14 RX ADMIN — PHENYLEPHRINE HYDROCHLORIDE 1 DROP: 25 SOLUTION/ DROPS OPHTHALMIC at 09:40

## 2019-02-14 RX ADMIN — MOXIFLOXACIN 1 DROP: 5 SOLUTION/ DROPS OPHTHALMIC at 09:48

## 2019-02-14 RX ADMIN — PHENYLEPHRINE HYDROCHLORIDE 1 DROP: 25 SOLUTION/ DROPS OPHTHALMIC at 09:48

## 2019-02-14 RX ADMIN — CYCLOPENTOLATE HYDROCHLORIDE 1 DROP: 10 SOLUTION/ DROPS OPHTHALMIC at 09:34

## 2019-02-14 RX ADMIN — PROPARACAINE HYDROCHLORIDE 1 DROP: 5 SOLUTION/ DROPS OPHTHALMIC at 09:48

## 2019-02-14 RX ADMIN — PHENYLEPHRINE HYDROCHLORIDE 1 DROP: 25 SOLUTION/ DROPS OPHTHALMIC at 09:34

## 2019-02-14 RX ADMIN — CYCLOPENTOLATE HYDROCHLORIDE 1 DROP: 10 SOLUTION/ DROPS OPHTHALMIC at 09:40

## 2019-02-14 RX ADMIN — KETOROLAC TROMETHAMINE 1 DROP: 5 SOLUTION OPHTHALMIC at 09:40

## 2019-02-14 RX ADMIN — CYCLOPENTOLATE HYDROCHLORIDE 1 DROP: 10 SOLUTION/ DROPS OPHTHALMIC at 09:48

## 2019-02-14 RX ADMIN — FENTANYL CITRATE 25 MCG: 50 INJECTION, SOLUTION INTRAMUSCULAR; INTRAVENOUS at 10:34

## 2019-02-14 RX ADMIN — KETOROLAC TROMETHAMINE 1 DROP: 5 SOLUTION OPHTHALMIC at 09:49

## 2019-02-14 RX ADMIN — KETOROLAC TROMETHAMINE 1 DROP: 5 SOLUTION OPHTHALMIC at 09:34

## 2019-02-14 RX ADMIN — ACETAMINOPHEN 975 MG: 325 TABLET ORAL at 09:31

## 2019-02-14 RX ADMIN — IBUPROFEN 400 MG: 400 TABLET, FILM COATED ORAL at 11:07

## 2019-02-14 RX ADMIN — MOXIFLOXACIN 1 DROP: 5 SOLUTION/ DROPS OPHTHALMIC at 09:40

## 2019-02-14 ASSESSMENT — ENCOUNTER SYMPTOMS: DYSRHYTHMIAS: 1

## 2019-02-14 ASSESSMENT — MIFFLIN-ST. JEOR: SCORE: 1715.99

## 2019-02-14 NOTE — NURSING NOTE
"Chief Complaint   Patient presents with     Pre-Op Exam     DOS:2/14/19, 2/28/19       Initial /70 (BP Location: Right arm, Patient Position: Sitting, Cuff Size: Adult Regular)   Pulse 56   Temp 96.7  F (35.9  C) (Temporal)   Ht 1.854 m (6' 1\")   Wt 94.7 kg (208 lb 12.8 oz)   SpO2 96%   BMI 27.55 kg/m   Estimated body mass index is 27.55 kg/m  as calculated from the following:    Height as of this encounter: 1.854 m (6' 1\").    Weight as of this encounter: 94.7 kg (208 lb 12.8 oz).  Medication Reconciliation: complete      ZOFIA Ortiz      "

## 2019-02-14 NOTE — OP NOTE
PATIENT NAME:  Carlton Bravo    :  1940    PATIENT NUMBER:  [id number]    DATE OF SURGERY:  2019    SURGEON:  Rojas Brown M.D.    PREOPERATIVE DIAGNOSIS: Cataract left eye.    POSTOPERATIVE DIAGNOSIS:  Same    PROCEDURE PERFORMED:    1. Phacoemulsification with posterior chamber intraocular lens left eye.    ANESTHESIA:  Topical/MAC    COMPLICATIONS:  None    PROCEDURE: Following adequate preoperative dilation the patient was given topical anesthesia consisting of Proparacaine.  The patient was brought to the operative suite where the eye was prepped and draped in the usual sterile fashion.  A lid speculum was applied. A super sharp blade was used to create a paracentesis, through which 1% preservative free Lidocaine was injected.  Visoelastic was then used to inflate the anterior chamber.  A biplanar incision at the clear cornea limbus was created with a keratome.  A continuous curvilinear capsulorrhexis was started with a cystitome and completed using Utrata forceps.  The lens was hydrodissected and hydro delineated using BSS on a cannula.  The lens nucleus was removed using phacoemulsification.  Remaining cortex was removed using irrigation and aspiration.  Viscoelastic was injected to inflate the capsular bag and a 23.5 D ZCB00 IOL was inserted into the capsular bag without difficulty.  Residual viscoelastic and provisc material was removed with irrigation and aspiration.  BSS was used to hydrate the corneal incision and paracentesis sites which were checked and noted to be watertight.  A drop of Vigamox was applied to the eye and a clear plastic shield was placed.  The patient tolerated the procedure well and left the operative suite in stable condition.    Rojas Brown M.D.

## 2019-02-14 NOTE — PROGRESS NOTES
[address] [address]  [phone]  Dept: [phone]    PRE-OP EVALUATION:  Today's date: 2019    Carlton Bravo (: 1940) presents for pre-operative evaluation assessment as requested by Dr. Brown.  He requires evaluation and anesthesia risk assessment prior to undergoing surgery/procedure for treatment of cataracts .    Proposed Surgery/ Procedure: LEFT PHACOEMULSIFICATION WITH STANDARD INTRAOCULAR LENS IMPLANT, RIGHT PHACOEMULSIFICATION WITH STANDARD INTRAOCULAR LENS IMPLANT  Date of Surgery/ Procedure: 19, 19  Time of Surgery/ Procedure: 10:25AM, 11:30AM  Hospital/Surgical Facility: [facility]  Fax number for surgical facility:   Primary Physician: Lona Pettit  Type of Anesthesia Anticipated: Local with MAC    Patient has a Health Care Directive or Living Will:  YES     1. NO - Do you have a history of heart attack, stroke, stent, bypass or surgery on an artery in the head, neck, heart or legs?  2. NO - Do you ever have any pain or discomfort in your chest?  3. NO - Do you have a history of  Heart Failure?  4. NO - Are you troubled by shortness of breath when: walking on the level, up a slight hill or at night?  5. NO - Do you currently have a cold, bronchitis or other respiratory infection?  6. NO - Do you have a cough, shortness of breath or wheezing?  7. NO - Do you sometimes get pains in the calves of your legs when you walk?  8. NO - Do you or anyone in your family have previous history of blood clots?  9. NO - Do you or does anyone in your family have a serious bleeding problem such as prolonged bleeding following surgeries or cuts?  10. NO - Have you ever had problems with anemia or been told to take iron pills?  11. NO - Have you had any abnormal blood loss such as black, tarry or bloody stools, or abnormal vaginal bleeding?  12. NO - Have you ever had a blood transfusion?  13. NO - Have you or any  of your relatives ever had problems with anesthesia?  14. NO - Do you have sleep apnea, excessive snoring or daytime drowsiness?  15. NO - Do you have any prosthetic heart valves?  16. NO - Do you have prosthetic joints?  17. NO - Is there any chance that you may be pregnant?      HPI:     HPI related to upcoming procedure: undergoing surgery/procedure for treatment of cataracts .        See problem list for active medical problems.  Problems all longstanding and stable, except as noted/documented.  See ROS for pertinent symptoms related to these conditions.                                                                                                                                                          .    MEDICAL HISTORY:     Patient Active Problem List    Diagnosis Date Noted     Hx of gastric ulcer 11/25/2018     Priority: Medium     Microalbuminuria 05/09/2018     Priority: Medium     Acute posthemorrhagic anemia 07/22/2016     Priority: Medium     Gastric ulcer with hemorrhage 07/22/2016     Priority: Medium     History of colonic polyps 07/22/2016     Priority: Medium     Alcohol abuse 07/13/2016     Priority: Medium     Atrial fibrillation (HCC) [I48.91] 12/21/2015     Priority: Medium     Long term current use of anticoagulant therapy 12/21/2015     Priority: Medium     HTN, goal below 140/90 08/03/2015     Priority: Medium     Dyslipidemia 08/03/2015     Priority: Medium     LDL goal <100       Hemorrhage of gastrointestinal tract 07/22/2015     Priority: Medium     Gastrointestinal hemorrhage 07/22/2015     Priority: Medium     Idiopathic cardiomyopathy (H) 12/01/2007     Priority: Medium     normal coronaries, EF 25%; f/u echo 2009 EF 50-55%       Benign prostatic hyperplasia with urinary obstruction 03/24/2005     Priority: Medium     Overview:   Bx with Dr Burt 1/2001  normal followed annually by Víctor Burt        Past Medical History:   Diagnosis Date     CHF (congestive heart failure) (H)      no symptoms after therapy and recovery of EF     Dyslipidemia     LDL goal <100     Hypertension      Idiopathic cardiomyopathy (H) 12/2007    normal coronaries, EF 25%; f/u echo 2009 EF 50-55%     Paroxysmal atrial fibrillation (H)      Past Surgical History:   Procedure Laterality Date     KNEE SURGERY  1990    bilateral arthroscopic     Current Outpatient Medications   Medication Sig Dispense Refill     carvedilol (COREG) 6.25 MG tablet TAKE 2 TABLETS BY MOUTH TWICE A  tablet 0     ketorolac (ACULAR) 0.5 % ophthalmic solution Apply 1 drop to eye 4 times daily Start 2 days prior to surgery in operative eye. 1 Bottle 1     ofloxacin (OCUFLOX) 0.3 % ophthalmic solution Apply 1 drop to eye 4 times daily for 10 days Start 2 days prior to surgery in operative eye 1 Bottle 1     pantoprazole (PROTONIX) 40 MG EC tablet TAKE 1 TABLET BY MOUTH TWICE DAILY 180 tablet 3     pravastatin (PRAVACHOL) 80 MG tablet Take 1 tablet (80 mg) by mouth daily 90 tablet 0     prednisoLONE acetate (PRED FORTE) 1 % ophthalmic suspension Apply 1 drop to eye 4 times daily for 21 days Start 2 days prior to surgery in operative eye. 1 Bottle 1     warfarin (COUMADIN) 2 MG tablet TAKE 2 TABS BY MOUTH ON MON,WED,&FRIDAY AND 1 TAB ALL OHTER DAYS OR AS DIRECTED BY INR CLINIC 180 tablet 0     warfarin (COUMADIN) 2 MG tablet Take 2 tabs (4 mg) Monday, Wednesday, & Friday, & 1 tab (2mg) all other days or as directed by INR clinic 180 tablet 0     OTC products: None, except as noted above    No Known Allergies   Latex Allergy: NO    Social History     Tobacco Use     Smoking status: Never Smoker     Smokeless tobacco: Never Used   Substance Use Topics     Alcohol use: Yes     Alcohol/week: 0.0 oz     Comment: was drinking about 5 drinks a night but quit 7/2015 due to GI bleed.      History   Drug Use No       REVIEW OF SYSTEMS:   CONSTITUTIONAL: NEGATIVE for fever, chills, change in weight  EYES: POSITIVE for Hx cataracts and NEGATIVE for eye  "pain bilateral, mattering bilateral and redness bilateral  ENT/MOUTH: NEGATIVE for ear, mouth and throat problems  RESP: NEGATIVE for significant cough or SOB  CV: NEGATIVE for chest pain, palpitations or peripheral edema  GI: NEGATIVE for nausea and vomiting  MUSCULOSKELETAL: NEGATIVE for significant arthralgias or myalgia  NEURO: NEGATIVE for weakness, dizziness or paresthesias  HEME/ALLERGY/IMMUNE: POSITIVE  for on coumadin  and NEGATIVE for anemia  PSYCHIATRIC: NEGATIVE for changes in mood or affect    EXAM:   /70 (BP Location: Right arm, Patient Position: Sitting, Cuff Size: Adult Regular)   Pulse 56   Temp 96.7  F (35.9  C) (Temporal)   Ht 1.854 m (6' 1\")   Wt 94.7 kg (208 lb 12.8 oz)   SpO2 96%   BMI 27.55 kg/m    GENERAL APPEARANCE: healthy, alert and no distress  HENT: ear canals and TM's normal and nose and mouth without ulcers or lesions  RESP: lungs clear to auscultation - no rales, rhonchi or wheezes  CV: color normal and irregularly irregular rhythm. EXTREMITIES:non pitting  edema in ankles to mid calves.  He has the typical hyperpigmented grossly scaling area with some increased violaceous coloring underlying the entire anterior shins with some small varicosities evident  ABDOMEN: soft, nontender, no HSM or masses and bowel sounds normal  MS: extremities normal- no gross deformities noted  Ambulates steady with use of cane   NEURO: Normal strength and tone, sensory exam grossly normal, mentation intact and speech normal  NEURO: mentation intact and speech normal  PSYCH: mentation appears normal and affect normal/bright    DIAGNOSTICS:   No labs or EKG required for low risk surgery (cataract, skin procedure, breast biopsy, etc)    Recent Labs   Lab Test 01/02/19  1616 12/28/18 12/21/18 11/19/18  1146  05/09/18  0843   HGB  --   --   --   --  13.0*  --  12.1*   PLT  --   --   --   --  284  --  250   INR  --  1.8 4.3   < >  --    < >  --      --   --   --  141  --  144   POTASSIUM 4.5  " --   --   --  3.6  --  3.5   CR 0.93  --   --   --  1.10  --  0.81   A1C  --   --   --   --   --   --  5.6    < > = values in this interval not displayed.      IMPRESSION:   Reason for surgery/procedure: undergoing surgery/procedure for treatment of cataracts .    Diagnosis/reason for consult: preop evaluation     The proposed surgical procedure is considered LOW risk.    REVISED CARDIAC RISK INDEX  The patient has the following serious cardiovascular risks for perioperative complications such as (MI, PE, VFib and 3  AV Block):  No serious cardiac risks  INTERPRETATION: 0 risks: Class I (very low risk - 0.4% complication rate)    The patient has the following additional risks for perioperative complications:  The 10-year ASCVD risk score (Kaden EDUARDO Jr., et al., 2013) is: 27.2%    Values used to calculate the score:      Age: 78 years      Sex: Male      Is Non- : No      Diabetic: No      Tobacco smoker: No      Systolic Blood Pressure: 120 mmHg      Is BP treated: Yes      HDL Cholesterol: 94 mg/dL      Total Cholesterol: 166 mg/dL  Alcohol abuse with risk of withdrawal      ICD-10-CM    1. Preop general physical exam Z01.818    2. Cataract of both eyes, unspecified cataract type H26.9        RECOMMENDATIONS:       APPROVAL GIVEN to proceed with proposed procedure, without further diagnostic evaluation       Signed Electronically by: NI Pearson CNP    Copy of this evaluation report is provided to requesting physician.    [facility] Preop Guidelines    Revised Cardiac Risk Index

## 2019-02-14 NOTE — DISCHARGE INSTRUCTIONS
Carlton Bravo    Cataract Surgery Postoperative Instructions    Postoperative Medications: After surgery, you will use several different eye drop  medications. In most cases you will start these eye drops 2 days before surgery.    1. Ocuflox - is an antibiotic drop that is used to minimize the risk of infection. It should be used 4 times daily for 10 days total or until you are told to discontinue.    (Acceptable alternatives to Ocuflox include: Zymaxid, Besivance, Gatafloxacin, Vigamox)     2.  Ketorolac - is an anti-inflammatory drop. Use it 4 time daily for 21 days total or until you are told to discontinue.  (Acceptable alternatives to Ketorolac include: Acuvail, Nevenac, Xibrom)    3. Prednisilone - is a steroid eye drop, used to minimize inflammation and modulate  healing. It should be used 4 times daily for 21 days total or until you are told to discontinue.  (Acceptable alternatives for Prednisilone include: Pred Forte, Omnipred, Econopred)      IF YOU GET AN ALTERNATIVE EYE DROP PLEASE FOLLOW THE DIRECTIONS ON THE BOTTLE OF DROPS. THEY WILL BE DIFFERENT!      The drops might sting a little when they are instilled, and that is normal.    It doesn t matter what order you put the drops into your eyes, but you should wait at least one minute between drops.    Please continue any glaucoma, dry eye, or other medications you were using prior to the surgery.    Please allow 24 to 48 hours when requesting refills, and call BEFORE you run out of drops.      Artificial Tears - are lubricating drops used to moisturize the eye. You can use these as much as you want, particularly if your eyes feel watery, gritty, or uncomfortable. Chilling these drops in the refrigerator results in a more soothing feeling. There are several brands of artificial tears available including, but not limited to: Optive, Refresh, Systane, Blink, Genteal, Soothe, and others. You should not use drops that  get the red out . You do not  need a prescription for these medications.      Restriction on Activities - It is extremely important that you DO NOT RUB THE  TREATED EYE.  - You will be given a clear plastic shield to wear as protection over your eye the  night after surgery.  - Refrain from any activities that may put your eye at risk of injury, as well as areas  containing a high volume of chemicals, dust, and debris.  - Do Not wear any eye makeup or moisturizer around the eye for 1 week after  surgery.  - Do Not swim or go into a hot-tub, Jacuzzi, or sauna for 1 week after surgery. You  can take showers as normal, but avoid getting shampoo or soap in your eyes.  - Avoid strenuous activity, including lifting more than 30 lbs, for 1 week after  surgery.  - It is fine to bathe, read, watch TV, and use the computer.  Symptoms requiring medical attention:  - Sudden onset of increased discharge from the eye  - Persistent or increasing pain in the eye  - Sudden decrease in vision  - Persistent nausea or vomiting    If you have any questions or concerns before or after your surgery, please contact:    Dr. Brown s office at [phone]        [facility]  Same-Day Surgery   Adult Discharge Orders & Instructions   For 24 hours after surgery  1. Get plenty of rest.  A responsible adult must stay with you for at least 24 hours after you leave the hospital.   2. Do not drive or use heavy equipment.  If you have weakness or tingling, don't drive or use heavy equipment until this feeling goes away.  3. Do not drink alcohol.  4. Avoid strenuous or risky activities.  Ask for help when climbing stairs.   5. You may feel lightheaded.  IF so, sit for a few minutes before standing.  Have someone help you get up.   6. If you have nausea (feel sick to your stomach): Drink only clear liquids such as apple juice, ginger ale, broth or 7-Up.  Rest may also help.  Be sure to drink enough fluids.  Move to a regular diet as you feel  able.  7. You may have a slight fever. Call the doctor if your fever is over 100 F (37.7 C) (taken under the tongue) or lasts longer than 24 hours.  8. You may have a dry mouth, a sore throat, muscle aches or trouble sleeping.  These should go away after 24 hours.  9. Do not make important or legal decisions.   Call your doctor for any of the followin.  Signs of infection (fever, growing tenderness at the surgery site, a large amount of drainage or bleeding, severe pain, foul-smelling drainage, redness, swelling).    2. It has been over 8 to 10 hours since surgery and you are still not able to urinate (pass water).    3.  Headache for over 24 hours.  To contact Dr Sanderson call:  [phone]_________________________       Follow up appointment with Dr. Brown on 19 at 0830    Ibuprofen 400 mg given at 1100  Tylenol 975 mg  given at 0930  **May repeat ibuprofen in 6 hours for pain, alternate with Tylenol every 4 hours

## 2019-02-14 NOTE — ANESTHESIA CARE TRANSFER NOTE
Patient: Carlton Bravo    Procedure(s):  LEFT PHACOEMULSIFICATION WITH STANDARD INTRAOCULAR LENS IMPLANT    Diagnosis: LEFT CATARACT  Diagnosis Additional Information: No value filed.    Anesthesia Type:   No value filed.     Note:  Airway :Room Air  Patient transferred to:Phase II  Handoff Report: Identifed the Patient, Identified the Reponsible Provider, Reviewed the pertinent medical history, Discussed the surgical course, Reviewed Intra-OP anesthesia mangement and issues during anesthesia, Set expectations for post-procedure period and Allowed opportunity for questions and acknowledgement of understanding      Vitals: (Last set prior to Anesthesia Care Transfer)    CRNA VITALS  2/14/2019 1023 - 2/14/2019 1058      2/14/2019             Pulse:  60    SpO2:  100 %                Electronically Signed By: NI Boyer CRNA  February 14, 2019  10:58 AM

## 2019-02-14 NOTE — ANESTHESIA PREPROCEDURE EVALUATION
Anesthesia Pre-Procedure Evaluation    Patient: Carlton Bravo   MRN:     [id number] Gender:   male   Age:    78 year old :      1940        Preoperative Diagnosis: LEFT CATARACT   Procedure(s):  LEFT PHACOEMULSIFICATION WITH STANDARD INTRAOCULAR LENS IMPLANT     Past Medical History:   Diagnosis Date     CHF (congestive heart failure) (H)     no symptoms after therapy and recovery of EF     Dyslipidemia     LDL goal <100     Hypertension      Idiopathic cardiomyopathy (H) 2007    normal coronaries, EF 25%; f/u echo  EF 50-55%     Paroxysmal atrial fibrillation (H)       Past Surgical History:   Procedure Laterality Date     KNEE SURGERY      bilateral arthroscopic          Anesthesia Evaluation     .             ROS/MED HX    ENT/Pulmonary:  - neg pulmonary ROS     Neurologic:  - neg neurologic ROS     Cardiovascular:  - neg cardiovascular ROS   (+) Dyslipidemia, hypertension----. : . CHF . . :. dysrhythmias a-fib, . Previous cardiac testing Echodate:results:Patient's rhythm is atrial fibrillation.  Global and regional left ventricular function is normal with an EF of 55-60%.  Biplane traced at 59%.  Global right ventricular function is normal. Mild right ventricular dilation  is present.  Both atria are moderate to severely enlarged.  Mild to moderate mitral and tricuspid insufficiency is present.  The inferior vena cava was normal in size with preserved respiratory  variability. Estimated mean right atrial pressure is 3 mmHg.  This study was compared with the study from 7/10/2014.  With direct comparison of the images, there has been no significant changedate: results: date: results: date: results:          METS/Exercise Tolerance:     Hematologic:  - neg hematologic  ROS       Musculoskeletal:  - neg musculoskeletal ROS       GI/Hepatic:  - neg GI/hepatic ROS       Renal/Genitourinary:  - ROS Renal section negative       Endo:  - neg endo ROS       Psychiatric:  - neg psychiatric ROS      "  Infectious Disease:  - neg infectious disease ROS       Malignancy:      - no malignancy   Other:    - neg other ROS                     PHYSICAL EXAM:   Mental Status/Neuro: A/A/O   Airway: Facies: Feasible  Mallampati: I  Mouth/Opening: Full  TM distance: > 6 cm  Neck ROM: Full   Respiratory: Auscultation: CTAB     Resp. Rate: Normal     Resp. Effort: Normal      CV: Rhythm: Regular  Rate: Age appropriate  Heart: Normal Sounds   Comments:      Dental: Normal                  Lab Results   Component Value Date    WBC 6.2 11/19/2018    HGB 13.0 (L) 11/19/2018    HCT 41.0 11/19/2018     11/19/2018     01/02/2019    POTASSIUM 4.5 01/02/2019    CHLORIDE 107 01/02/2019    CO2 28 01/02/2019    BUN 8 01/02/2019    CR 0.93 01/02/2019     (H) 01/02/2019    HEAVEN 8.7 01/02/2019    MAG 2.0 01/02/2019    ALBUMIN 3.2 (L) 11/19/2018    PROTTOTAL 7.4 11/19/2018    ALT 39 11/19/2018    AST 53 (H) 11/19/2018    ALKPHOS 126 11/19/2018    BILITOTAL 1.4 (H) 11/19/2018    INR 3.3 (H) 02/14/2019    TSH 2.51 10/20/2015    T4 1.32 05/27/2008       Preop Vitals  BP Readings from Last 3 Encounters:   02/14/19 138/90   02/14/19 120/70   01/02/19 (P) 123/81    Pulse Readings from Last 3 Encounters:   02/14/19 74   02/14/19 56   01/02/19 (P) 54      Resp Readings from Last 3 Encounters:   02/14/19 16    SpO2 Readings from Last 3 Encounters:   02/14/19 99%   02/14/19 96%   01/02/19 (P) 98%      Temp Readings from Last 1 Encounters:   02/14/19 36.2  C (97.2  F) (Tympanic)    Ht Readings from Last 1 Encounters:   02/14/19 1.854 m (6' 1\")      Wt Readings from Last 1 Encounters:   02/14/19 94.7 kg (208 lb 12.8 oz)    Estimated body mass index is 27.55 kg/m  as calculated from the following:    Height as of an earlier encounter on 2/14/19: 1.854 m (6' 1\").    Weight as of an earlier encounter on 2/14/19: 94.7 kg (208 lb 12.8 oz).     LDA:  Peripheral IV 02/14/19 Left Lower forearm (Active)   Site Assessment WDL 2/14/2019  9:45 " AM   Line Status Infusing 2/14/2019  9:45 AM   Phlebitis Scale 0-->no symptoms 2/14/2019  9:45 AM   Infiltration Scale 0 2/14/2019  9:45 AM   Infiltration Site Treatment Method  None 2/14/2019  9:45 AM   Extravasation? No 2/14/2019  9:45 AM   Dressing Intervention New dressing  2/14/2019  9:45 AM   Number of days: 0            Assessment:   ASA SCORE: 3    NPO Status: > 6 hours since completed Solid Foods   Documentation: H&P complete; Preop Testing complete; Consents complete   Proceeding: Proceed without further delay  Tobacco Use:  NO Active use of Tobacco/UNKNOWN Tobacco use status     Plan:   Anes. Type:  MAC   Pre-Induction: Midazolam IV   Induction:  Not applicable   Airway: Native Airway   Access/Monitoring: PIV   Maintenance: N/a   Emergence: N/a   Logistics: Same Day Surgery     Postop Pain/Sedation Strategy:  Standard-Options: Opioids PRN     PONV Management:  Adult Risk Factors:, Non-Smoker, Postop Opioids  Prevention: Ondansetron     CONSENT: Direct conversation   Plan and risks discussed with: Patient   Blood Products: Consent Deferred (Minimal Blood Loss)                         Darwin Crabtree MD

## 2019-02-15 ENCOUNTER — TELEPHONE (OUTPATIENT)
Dept: PEDIATRICS | Facility: CLINIC | Age: 79
End: 2019-02-15

## 2019-02-15 NOTE — TELEPHONE ENCOUNTER
[facility] Call Center    Phone Message    May a detailed message be left on voicemail: yes    Reason for Call: Other: Ramila is calling with  Homecare. She would like a call with dosage for coumadin and also when the next INR should be drawn. Please call [phone] to discuss.      Action Taken: Message routed to:  Primary Care p [phone]

## 2019-02-15 NOTE — TELEPHONE ENCOUNTER
Called [facility] RN.  Left message asking for detailed ACC questions and last week's dosing.    Nisha Blanco RN, [facility]

## 2019-02-18 NOTE — TELEPHONE ENCOUNTER
Ramila, Homecare RN called back.        Patient INR  3.3   INR Goal  2.0-3.0    Patient is taking Warfarin As directed       4 mg  Monday, Wednesday, Friday    Bleeding or Clotting concerns No    Missed doses or extra doses No    Recent illnesses or infections No but did have cataract surgery.    New medications or discontinue of medications no    Dietary or supplement/vitamin changes no    Routing to provider to please advise on dosing and recheck.    Homecare RN will be back to patient's home the week of 2/25.      Nisha Blanco RN, [facility]

## 2019-02-18 NOTE — TELEPHONE ENCOUNTER
Called Ramila, left detailed message with Dania Pettit's advisement.     Asked for recheck on 2/26/19.  Left number to call back with any questions.    Nisha Blanco RN, [facility]

## 2019-02-18 NOTE — TELEPHONE ENCOUNTER
Called Ramila, left message with phone number to call back.    Nisha Blanco RN, [facility]

## 2019-02-19 ENCOUNTER — OFFICE VISIT (OUTPATIENT)
Dept: OPHTHALMOLOGY | Facility: CLINIC | Age: 79
End: 2019-02-19
Payer: MEDICARE

## 2019-02-19 DIAGNOSIS — H25.13 AGE-RELATED NUCLEAR CATARACT OF BOTH EYES: ICD-10-CM

## 2019-02-19 DIAGNOSIS — Z96.1 PSEUDOPHAKIA OF LEFT EYE: Primary | ICD-10-CM

## 2019-02-19 PROCEDURE — 99024 POSTOP FOLLOW-UP VISIT: CPT | Performed by: OPHTHALMOLOGY

## 2019-02-19 RX ORDER — KETOROLAC TROMETHAMINE 5 MG/ML
1 SOLUTION OPHTHALMIC 4 TIMES DAILY
Qty: 10 ML | Refills: 1 | Status: SHIPPED | OUTPATIENT
Start: 2019-02-19 | End: 2020-02-25

## 2019-02-19 RX ORDER — PREDNISOLONE ACETATE 10 MG/ML
1 SUSPENSION/ DROPS OPHTHALMIC 4 TIMES DAILY
Qty: 10 ML | Refills: 1 | Status: SHIPPED | OUTPATIENT
Start: 2019-02-19 | End: 2020-02-25

## 2019-02-19 RX ORDER — OFLOXACIN 3 MG/ML
1 SOLUTION/ DROPS OPHTHALMIC 4 TIMES DAILY
Qty: 10 ML | Refills: 1 | Status: SHIPPED | OUTPATIENT
Start: 2019-02-19 | End: 2020-02-25

## 2019-02-19 ASSESSMENT — CUP TO DISC RATIO
OS_RATIO: 0.4
OD_RATIO: 0.4

## 2019-02-19 ASSESSMENT — SLIT LAMP EXAM - LIDS
COMMENTS: NORMAL
COMMENTS: NORMAL

## 2019-02-19 ASSESSMENT — VISUAL ACUITY
OS_SC: 20/60
METHOD: SNELLEN - LINEAR
OS_PH_SC: 20/40
OS_PH_SC+: -2
OS_SC+: +2

## 2019-02-19 ASSESSMENT — EXTERNAL EXAM - RIGHT EYE: OD_EXAM: NORMAL

## 2019-02-19 ASSESSMENT — EXTERNAL EXAM - LEFT EYE: OS_EXAM: NORMAL

## 2019-02-19 NOTE — NURSING NOTE
Patient presents with:  Post Op (Ophthalmology) Right Eye: BCL left eye S/P Phaco IOL left eye 02/14/2019 PF ketololac ofloxacin qid left eye pt is struggling with the small bottles and is running out of gtts. And will be out before the 21 days prescribed      Referring Provider:  No referring provider defined for this encounter.        Dania Eric, [facility]

## 2019-02-19 NOTE — PROGRESS NOTES
Assessment & Plan   Carlton Bravo is a 78 year old male who presents with:   Review of systems for the eyes was negative other than the pertinent positives and negatives noted in the HPI.    Pseudophakia of left eye    Cataract, right eye      - ketorolac (ACULAR) 0.5 % ophthalmic solution; Apply 1 drop to eye 4 times daily Start 2 days prior to surgery in operative eye.  - ofloxacin (OCUFLOX) 0.3 % ophthalmic solution; Apply 1 drop to eye 4 times daily Start 2 days prior to surgery in operative eye  - prednisoLONE acetate (PRED FORTE) 1 % ophthalmic suspension; Apply 1 drop to eye 4 times daily Start 2 days prior to surgery in operative eye.    CE/PCIOL right eye next week      Attending Physician Attestation:  Complete documentation of historical and exam elements from today's encounter can be found in the full encounter summary report (not reduplicated in this progress note).  I personally obtained the chief complaint(s) and history of present illness.  I confirmed and edited as necessary the review of systems, past medical/surgical history, family history, social history, and examination findings as documented by others; and I examined the patient myself.  I personally reviewed the relevant tests, images, and reports as documented above.  I formulated and edited as necessary the assessment and plan and discussed the findings and management plan with the patient and family. - Rojas Brown MD

## 2019-02-26 ENCOUNTER — TELEPHONE (OUTPATIENT)
Dept: PEDIATRICS | Facility: CLINIC | Age: 79
End: 2019-02-26

## 2019-02-26 NOTE — TELEPHONE ENCOUNTER
Message has been left for home care RN.  Will await call back.    Nisha Blanco RN, [facility]

## 2019-02-26 NOTE — TELEPHONE ENCOUNTER
Called kar Mcgrath detailed message with Dania Pettit NP message.    Nisha Blanco RN, [facility]

## 2019-02-26 NOTE — TELEPHONE ENCOUNTER
2.26.19  Ramila, home care nurse is calling Nisha back.   Message:   Pt still taking Coumadin as ordered.  No other changes or problems.

## 2019-02-26 NOTE — TELEPHONE ENCOUNTER
Patient INR  3.1   INR Goal  2.0-3.0    Patient is taking Warfarin    4 mg Monday and Friday   2 mg all other days    Bleeding or Clotting concerns No    Missed doses or extra doses No    Recent illnesses or infections No    New medications or discontinue of medications No    Dietary or supplement/vitamin changes No    Called Ramila, left message with phone number to call back if there were any changes in the dosing otherwise routing to Dania Pettit NP to review and advise.    Nisha Blanco RN, [facility]

## 2019-02-27 ENCOUNTER — ANESTHESIA EVENT (OUTPATIENT)
Dept: SURGERY | Facility: AMBULATORY SURGERY CENTER | Age: 79
End: 2019-02-27

## 2019-02-28 ENCOUNTER — HOSPITAL ENCOUNTER (OUTPATIENT)
Facility: AMBULATORY SURGERY CENTER | Age: 79
Discharge: HOME | End: 2019-02-28
Attending: OPHTHALMOLOGY | Admitting: OPHTHALMOLOGY
Payer: MEDICARE

## 2019-02-28 ENCOUNTER — ANESTHESIA (OUTPATIENT)
Dept: SURGERY | Facility: AMBULATORY SURGERY CENTER | Age: 79
End: 2019-02-28
Payer: MEDICARE

## 2019-02-28 VITALS
OXYGEN SATURATION: 99 % | RESPIRATION RATE: 18 BRPM | DIASTOLIC BLOOD PRESSURE: 73 MMHG | TEMPERATURE: 97.5 F | SYSTOLIC BLOOD PRESSURE: 117 MMHG

## 2019-02-28 LAB — INR BLD: 2.6 (ref 0.86–1.14)

## 2019-02-28 PROCEDURE — G8907 PT DOC NO EVENTS ON DISCHARG: HCPCS

## 2019-02-28 PROCEDURE — G8918 PT W/O PREOP ORDER IV AB PRO: HCPCS

## 2019-02-28 PROCEDURE — 66984 XCAPSL CTRC RMVL W/O ECP: CPT | Mod: RT

## 2019-02-28 PROCEDURE — 36416 COLLJ CAPILLARY BLOOD SPEC: CPT | Performed by: INTERNAL MEDICINE

## 2019-02-28 PROCEDURE — 85610 PROTHROMBIN TIME: CPT | Mod: QW | Performed by: INTERNAL MEDICINE

## 2019-02-28 DEVICE — EYE IMP IOL AMO PCL TECNIS ZCB00 23.5: Type: IMPLANTABLE DEVICE | Site: EYE | Status: FUNCTIONAL

## 2019-02-28 RX ORDER — HYDROMORPHONE HYDROCHLORIDE 1 MG/ML
.3-.5 INJECTION, SOLUTION INTRAMUSCULAR; INTRAVENOUS; SUBCUTANEOUS EVERY 10 MIN PRN
Status: DISCONTINUED | OUTPATIENT
Start: 2019-02-28 | End: 2019-03-01 | Stop reason: HOSPADM

## 2019-02-28 RX ORDER — PROPARACAINE HYDROCHLORIDE 5 MG/ML
1 SOLUTION/ DROPS OPHTHALMIC
Status: COMPLETED | OUTPATIENT
Start: 2019-02-28 | End: 2019-02-28

## 2019-02-28 RX ORDER — ACETAMINOPHEN 325 MG/1
975 TABLET ORAL ONCE
Status: COMPLETED | OUTPATIENT
Start: 2019-02-28 | End: 2019-02-28

## 2019-02-28 RX ORDER — PHENYLEPHRINE HYDROCHLORIDE 25 MG/ML
1 SOLUTION/ DROPS OPHTHALMIC
Status: COMPLETED | OUTPATIENT
Start: 2019-02-28 | End: 2019-02-28

## 2019-02-28 RX ORDER — CYCLOPENTOLATE HYDROCHLORIDE 10 MG/ML
1 SOLUTION/ DROPS OPHTHALMIC
Status: COMPLETED | OUTPATIENT
Start: 2019-02-28 | End: 2019-02-28

## 2019-02-28 RX ORDER — KETOROLAC TROMETHAMINE 5 MG/ML
1 SOLUTION OPHTHALMIC
Status: COMPLETED | OUTPATIENT
Start: 2019-02-28 | End: 2019-02-28

## 2019-02-28 RX ORDER — NALOXONE HYDROCHLORIDE 0.4 MG/ML
.1-.4 INJECTION, SOLUTION INTRAMUSCULAR; INTRAVENOUS; SUBCUTANEOUS
Status: DISCONTINUED | OUTPATIENT
Start: 2019-02-28 | End: 2019-03-01 | Stop reason: HOSPADM

## 2019-02-28 RX ORDER — ONDANSETRON 4 MG/1
4 TABLET, ORALLY DISINTEGRATING ORAL EVERY 30 MIN PRN
Status: DISCONTINUED | OUTPATIENT
Start: 2019-02-28 | End: 2019-03-01 | Stop reason: HOSPADM

## 2019-02-28 RX ORDER — MEPERIDINE HYDROCHLORIDE 25 MG/ML
12.5 INJECTION INTRAMUSCULAR; INTRAVENOUS; SUBCUTANEOUS
Status: DISCONTINUED | OUTPATIENT
Start: 2019-02-28 | End: 2019-03-01 | Stop reason: HOSPADM

## 2019-02-28 RX ORDER — MOXIFLOXACIN 5 MG/ML
SOLUTION/ DROPS OPHTHALMIC PRN
Status: DISCONTINUED | OUTPATIENT
Start: 2019-02-28 | End: 2019-02-28 | Stop reason: HOSPADM

## 2019-02-28 RX ORDER — MOXIFLOXACIN 5 MG/ML
1 SOLUTION/ DROPS OPHTHALMIC
Status: COMPLETED | OUTPATIENT
Start: 2019-02-28 | End: 2019-02-28

## 2019-02-28 RX ORDER — SODIUM CHLORIDE, SODIUM LACTATE, POTASSIUM CHLORIDE, CALCIUM CHLORIDE 600; 310; 30; 20 MG/100ML; MG/100ML; MG/100ML; MG/100ML
INJECTION, SOLUTION INTRAVENOUS CONTINUOUS
Status: DISCONTINUED | OUTPATIENT
Start: 2019-02-28 | End: 2019-03-01 | Stop reason: HOSPADM

## 2019-02-28 RX ORDER — ONDANSETRON 2 MG/ML
4 INJECTION INTRAMUSCULAR; INTRAVENOUS EVERY 30 MIN PRN
Status: DISCONTINUED | OUTPATIENT
Start: 2019-02-28 | End: 2019-03-01 | Stop reason: HOSPADM

## 2019-02-28 RX ORDER — KETAMINE HYDROCHLORIDE 10 MG/ML
INJECTION INTRAMUSCULAR; INTRAVENOUS PRN
Status: DISCONTINUED | OUTPATIENT
Start: 2019-02-28 | End: 2019-02-28

## 2019-02-28 RX ORDER — LIDOCAINE 40 MG/G
CREAM TOPICAL
Status: DISCONTINUED | OUTPATIENT
Start: 2019-02-28 | End: 2019-03-01 | Stop reason: HOSPADM

## 2019-02-28 RX ORDER — SODIUM CHLORIDE, SODIUM LACTATE, POTASSIUM CHLORIDE, CALCIUM CHLORIDE 600; 310; 30; 20 MG/100ML; MG/100ML; MG/100ML; MG/100ML
500 INJECTION, SOLUTION INTRAVENOUS CONTINUOUS
Status: DISCONTINUED | OUTPATIENT
Start: 2019-02-28 | End: 2019-03-01 | Stop reason: HOSPADM

## 2019-02-28 RX ORDER — FENTANYL CITRATE 50 UG/ML
25-50 INJECTION, SOLUTION INTRAMUSCULAR; INTRAVENOUS
Status: DISCONTINUED | OUTPATIENT
Start: 2019-02-28 | End: 2019-03-01 | Stop reason: HOSPADM

## 2019-02-28 RX ORDER — TETRACAINE HYDROCHLORIDE 5 MG/ML
SOLUTION OPHTHALMIC PRN
Status: DISCONTINUED | OUTPATIENT
Start: 2019-02-28 | End: 2019-02-28 | Stop reason: HOSPADM

## 2019-02-28 RX ADMIN — CYCLOPENTOLATE HYDROCHLORIDE 1 DROP: 10 SOLUTION/ DROPS OPHTHALMIC at 09:41

## 2019-02-28 RX ADMIN — PHENYLEPHRINE HYDROCHLORIDE 1 DROP: 25 SOLUTION/ DROPS OPHTHALMIC at 09:41

## 2019-02-28 RX ADMIN — CYCLOPENTOLATE HYDROCHLORIDE 1 DROP: 10 SOLUTION/ DROPS OPHTHALMIC at 09:53

## 2019-02-28 RX ADMIN — PROPARACAINE HYDROCHLORIDE 1 DROP: 5 SOLUTION/ DROPS OPHTHALMIC at 09:40

## 2019-02-28 RX ADMIN — KETOROLAC TROMETHAMINE 1 DROP: 5 SOLUTION OPHTHALMIC at 09:53

## 2019-02-28 RX ADMIN — PHENYLEPHRINE HYDROCHLORIDE 1 DROP: 25 SOLUTION/ DROPS OPHTHALMIC at 09:47

## 2019-02-28 RX ADMIN — MOXIFLOXACIN 1 DROP: 5 SOLUTION/ DROPS OPHTHALMIC at 09:53

## 2019-02-28 RX ADMIN — MOXIFLOXACIN 1 DROP: 5 SOLUTION/ DROPS OPHTHALMIC at 09:47

## 2019-02-28 RX ADMIN — CYCLOPENTOLATE HYDROCHLORIDE 1 DROP: 10 SOLUTION/ DROPS OPHTHALMIC at 09:47

## 2019-02-28 RX ADMIN — SODIUM CHLORIDE, SODIUM LACTATE, POTASSIUM CHLORIDE, CALCIUM CHLORIDE 500 ML: 600; 310; 30; 20 INJECTION, SOLUTION INTRAVENOUS at 09:40

## 2019-02-28 RX ADMIN — PHENYLEPHRINE HYDROCHLORIDE 1 DROP: 25 SOLUTION/ DROPS OPHTHALMIC at 09:53

## 2019-02-28 RX ADMIN — KETOROLAC TROMETHAMINE 1 DROP: 5 SOLUTION OPHTHALMIC at 09:41

## 2019-02-28 RX ADMIN — KETAMINE HYDROCHLORIDE 20 MG: 10 INJECTION INTRAMUSCULAR; INTRAVENOUS at 10:41

## 2019-02-28 RX ADMIN — MOXIFLOXACIN 1 DROP: 5 SOLUTION/ DROPS OPHTHALMIC at 09:41

## 2019-02-28 RX ADMIN — ACETAMINOPHEN 975 MG: 325 TABLET ORAL at 09:39

## 2019-02-28 RX ADMIN — KETOROLAC TROMETHAMINE 1 DROP: 5 SOLUTION OPHTHALMIC at 09:47

## 2019-02-28 ASSESSMENT — ENCOUNTER SYMPTOMS
ORTHOPNEA: 0
DYSRHYTHMIAS: 1

## 2019-02-28 NOTE — ANESTHESIA POSTPROCEDURE EVALUATION
Anesthesia POST Procedure Evaluation    Patient: Carlton Bravo   MRN:     [id number] Gender:   male   Age:    78 year old :      1940        Preoperative Diagnosis: RIGHT CATARACT   Procedure(s):  RIGHT PHACOEMULSIFICATION WITH STANDARD INTRAOCULAR LENS IMPLANT   Postop Comments: No value filed.       Anesthesia Type:  MAC    Reportable Event: NO     PAIN: Uncomplicated   Sign Out status: Comfortable, Well controlled pain     PONV: No PONV   Sign Out status:  No Nausea or Vomiting     Neuro/Psych: Uneventful perioperative course   Sign Out Status: Preoperative baseline; Age appropriate mentation     Airway/Resp.: Uneventful perioperative course   Sign Out Status: Non labored breathing, age appropriate RR; Resp. Status within EXPECTED Parameters     CV: Uneventful perioperative course   Sign Out status: Appropriate BP and perfusion indices; Appropriate HR/Rhythm     Disposition:   Sign Out in:  PACU  Disposition:  Phase II; Home  Recovery Course: Uneventful  Follow-Up: Not required           Last Anesthesia Record Vitals:  CRNA VITALS  2019 1028 - 2019 1128      2019             Pulse:  74    SpO2:  100 %          Last PACU/Preop Vitals:  Vitals:    19 0935 19 1102 19 1110   BP:  122/72 117/73   Resp: 16 16 18   Temp: 36.1  C (97  F) 36.4  C (97.6  F) 36.4  C (97.5  F)   SpO2:  98% 99%         Electronically Signed By: Nickie Nicole MD, 2019, 11:39 AM

## 2019-02-28 NOTE — DISCHARGE INSTRUCTIONS
Carlton Bravo    Cataract Surgery Postoperative Instructions    Postoperative Medications: After surgery, you will use several different eye drop  medications. In most cases you will start these eye drops 2 days before surgery.    1. Ocuflox - is an antibiotic drop that is used to minimize the risk of infection. It should be used 4 times daily for 10 days total or until you are told to discontinue.    (Acceptable alternatives to Ocuflox include: Zymaxid, Besivance, Gatafloxacin, Vigamox)     2.  Ketorolac - is an anti-inflammatory drop. Use it 4 time daily for 21 days total or until you are told to discontinue.  (Acceptable alternatives to Ketorolac include: Acuvail, Nevenac, Xibrom)    3. Prednisilone - is a steroid eye drop, used to minimize inflammation and modulate  healing. It should be used 4 times daily for 21 days total or until you are told to discontinue.  (Acceptable alternatives for Prednisilone include: Pred Forte, Omnipred, Econopred)      IF YOU GET AN ALTERNATIVE EYE DROP PLEASE FOLLOW THE DIRECTIONS ON THE BOTTLE OF DROPS. THEY WILL BE DIFFERENT!      The drops might sting a little when they are instilled, and that is normal.    It doesn t matter what order you put the drops into your eyes, but you should wait at least one minute between drops.    Please continue any glaucoma, dry eye, or other medications you were using prior to the surgery.    Please allow 24 to 48 hours when requesting refills, and call BEFORE you run out of drops.      Artificial Tears - are lubricating drops used to moisturize the eye. You can use these as much as you want, particularly if your eyes feel watery, gritty, or uncomfortable. Chilling these drops in the refrigerator results in a more soothing feeling. There are several brands of artificial tears available including, but not limited to: Optive, Refresh, Systane, Blink, Genteal, Soothe, and others. You should not use drops that  get the red out . You do not  need a prescription for these medications.      Restriction on Activities - It is extremely important that you DO NOT RUB THE  TREATED EYE.  - You will be given a clear plastic shield to wear as protection over your eye the  night after surgery.  - Refrain from any activities that may put your eye at risk of injury, as well as areas  containing a high volume of chemicals, dust, and debris.  - Do Not wear any eye makeup or moisturizer around the eye for 1 week after  surgery.  - Do Not swim or go into a hot-tub, Jacuzzi, or sauna for 1 week after surgery. You  can take showers as normal, but avoid getting shampoo or soap in your eyes.  - Avoid strenuous activity, including lifting more than 30 lbs, for 1 week after  surgery.  - It is fine to bathe, read, watch TV, and use the computer.  Symptoms requiring medical attention:  - Sudden onset of increased discharge from the eye  - Persistent or increasing pain in the eye  - Sudden decrease in vision  - Persistent nausea or vomiting    If you have any questions or concerns before or after your surgery, please contact:    Dr. Brown s office at [phone]          [facility]  Same-Day Surgery   Adult Discharge Orders & Instructions   For 24 hours after surgery  1. Get plenty of rest.  A responsible adult must stay with you for at least 24 hours after you leave the hospital.   2. Do not drive or use heavy equipment.  If you have weakness or tingling, don't drive or use heavy equipment until this feeling goes away.  3. Do not drink alcohol.  4. Avoid strenuous or risky activities.  Ask for help when climbing stairs.   5. You may feel lightheaded.  IF so, sit for a few minutes before standing.  Have someone help you get up.   6. If you have nausea (feel sick to your stomach): Drink only clear liquids such as apple juice, ginger ale, broth or 7-Up.  Rest may also help.  Be sure to drink enough fluids.  Move to a regular diet as you feel  able.  7. You may have a slight fever. Call the doctor if your fever is over 100 F (37.7 C) (taken under the tongue) or lasts longer than 24 hours.  8. You may have a dry mouth, a sore throat, muscle aches or trouble sleeping.  These should go away after 24 hours.  9. Do not make important or legal decisions.   Call your doctor for any of the followin.  Signs of infection (fever, growing tenderness at the surgery site, a large amount of drainage or bleeding, severe pain, foul-smelling drainage, redness, swelling).    2. It has been over 8 to 10 hours since surgery and you are still not able to urinate (pass water).    3.  Headache for over 24 hours.

## 2019-02-28 NOTE — ANESTHESIA PREPROCEDURE EVALUATION
Anesthesia Pre-Procedure Evaluation    Patient: Carlton Bravo   MRN:     [id number] Gender:   male   Age:    78 year old :      1940        Preoperative Diagnosis: RIGHT CATARACT   Procedure(s):  RIGHT PHACOEMULSIFICATION WITH STANDARD INTRAOCULAR LENS IMPLANT     Past Medical History:   Diagnosis Date     CHF (congestive heart failure) (H)     no symptoms after therapy and recovery of EF     Dyslipidemia     LDL goal <100     Hypertension      Idiopathic cardiomyopathy (H) 2007    normal coronaries, EF 25%; f/u echo  EF 50-55%     Paroxysmal atrial fibrillation (H)       Past Surgical History:   Procedure Laterality Date     CATARACT IOL, RT/LT Left 2019     CATARACT IOL, RT/LT Right 2019     KNEE SURGERY      bilateral arthroscopic     PHACOEMULSIFICATION WITH STANDARD INTRAOCULAR LENS IMPLANT Left 2019    Procedure: LEFT PHACOEMULSIFICATION WITH STANDARD INTRAOCULAR LENS IMPLANT;  Surgeon: Rojas Brown MD;  Location: [facility] OR          Anesthesia Evaluation     . Pt has had prior anesthetic.     No history of anesthetic complications          ROS/MED HX    ENT/Pulmonary:  - neg pulmonary ROS     Neurologic:  - neg neurologic ROS     Cardiovascular:     (+) Dyslipidemia, hypertension----. Taking blood thinners : . CHF . . :. dysrhythmias a-fib, . Previous cardiac testing Echodate:2017results:Normal RV and LV function  Enlarged R and L atria  Mild to moderate MR and TRdate: results: date: results: date: results:         (-) ORTEGA, orthopnea/PND and syncope   METS/Exercise Tolerance:  4 - Raking leaves, gardening   Hematologic:     (+) Anemia, -      Musculoskeletal:         GI/Hepatic: Comment: H/o PUD, bleed        Renal/Genitourinary:     (+) BPH,       Endo:  - neg endo ROS       Psychiatric:  - neg psychiatric ROS       Infectious Disease:  - neg infectious disease ROS       Malignancy:      - no malignancy   Other:                         PHYSICAL EXAM:   Mental  "Status/Neuro: A/A/O   Airway: Facies: Feasible  Mallampati: II  Mouth/Opening: Full  TM distance: < 6 cm  Neck ROM: Full   Respiratory: Auscultation: CTAB     Resp. Rate: Normal     Resp. Effort: Normal      CV: Rhythm: Regular  Rate: Age appropriate  Heart: Normal Sounds   Comments:      Dental: Normal                  Lab Results   Component Value Date    WBC 6.2 11/19/2018    HGB 13.0 (L) 11/19/2018    HCT 41.0 11/19/2018     11/19/2018     01/02/2019    POTASSIUM 4.5 01/02/2019    CHLORIDE 107 01/02/2019    CO2 28 01/02/2019    BUN 8 01/02/2019    CR 0.93 01/02/2019     (H) 01/02/2019    HEAVEN 8.7 01/02/2019    MAG 2.0 01/02/2019    ALBUMIN 3.2 (L) 11/19/2018    PROTTOTAL 7.4 11/19/2018    ALT 39 11/19/2018    AST 53 (H) 11/19/2018    ALKPHOS 126 11/19/2018    BILITOTAL 1.4 (H) 11/19/2018    INR 3.3 (H) 02/14/2019    TSH 2.51 10/20/2015    T4 1.32 05/27/2008       Preop Vitals  BP Readings from Last 3 Encounters:   02/14/19 146/70   02/14/19 120/70   01/02/19 (P) 123/81    Pulse Readings from Last 3 Encounters:   02/14/19 74   02/14/19 56   01/02/19 (P) 54      Resp Readings from Last 3 Encounters:   02/14/19 16    SpO2 Readings from Last 3 Encounters:   02/14/19 98%   02/14/19 96%   01/02/19 (P) 98%      Temp Readings from Last 1 Encounters:   02/14/19 36.2  C (97.2  F) (Tympanic)    Ht Readings from Last 1 Encounters:   02/14/19 1.854 m (6' 1\")      Wt Readings from Last 1 Encounters:   02/14/19 94.7 kg (208 lb 12.8 oz)    Estimated body mass index is 27.55 kg/m  as calculated from the following:    Height as of 2/14/19: 1.854 m (6' 1\").    Weight as of 2/14/19: 94.7 kg (208 lb 12.8 oz).     LDA:            Assessment:   ASA SCORE: 3    NPO Status: > 6 hours since completed Solid Foods   Documentation: H&P complete; Preop Testing complete; Consents complete   Proceeding: Proceed without further delay  Tobacco Use:  NO Active use of Tobacco/UNKNOWN Tobacco use status     Plan:   Anes. Type:  " MAC   Pre-Induction: Midazolam IV   Induction:  Not applicable   Airway: Native Airway   Access/Monitoring: PIV   Maintenance: N/a   Emergence: N/a   Logistics: Same Day Surgery     Postop Pain/Sedation Strategy:  Standard-Options: Opioids PRN     PONV Management:  Adult Risk Factors:, Non-Smoker  Prevention: Ondansetron     CONSENT: Direct conversation   Plan and risks discussed with: Patient   Blood Products: N/a       Comments for Plan/Consent:  MAC, sedation, GA as back up  IV, standard ASA monitors  All pertinent results and records reviewed, risks, included but not limited to hypoventilation, hypoxemia, laryngo/bronchospasm, N/V d/w patient, all questions, concerns addressed                         Nickie Nicole MD

## 2019-02-28 NOTE — OP NOTE
PATIENT NAME:  Carlton Bravo    :  1940    PATIENT NUMBER:  [id number]    DATE OF SURGERY:  2019    SURGEON:  Rojas Brown M.D.    PREOPERATIVE DIAGNOSIS: Cataract right eye.    POSTOPERATIVE DIAGNOSIS:  Same    PROCEDURE PERFORMED:    1. Phacoemulsification with posterior chamber intraocular lens right eye.    ANESTHESIA:  Topical/MAC    COMPLICATIONS:  None    PROCEDURE: Following adequate preoperative dilation the patient was given topical anesthesia consisting of Proparacaine.  The patient was brought to the operative suite where the eye was prepped and draped in the usual sterile fashion.  A lid speculum was applied. A super sharp blade was used to create a paracentesis, through which 1% preservative free Lidocaine was injected.  Visoelastic was then used to inflate the anterior chamber.  A biplanar incision at the clear cornea limbus was created with a keratome.  A continuous curvilinear capsulorrhexis was started with a cystitome and completed using Utrata forceps.  The lens was hydrodissected and hydro delineated using BSS on a cannula.  The lens nucleus was removed using phacoemulsification.  Remaining cortex was removed using irrigation and aspiration.  Viscoelastic was injected to inflate the capsular bag and a 23.5 D [id number] IOL was inserted into the capsular bag without difficulty.  Residual viscoelastic and provisc material was removed with irrigation and aspiration.  BSS was used to hydrate the corneal incision and paracentesis sites which were checked and noted to be watertight.  A drop of Vigamox was applied to the eye and a clear plastic shield was placed.  The patient tolerated the procedure well and left the operative suite in stable condition.    Rojas Brown M.D.

## 2019-03-05 ENCOUNTER — OFFICE VISIT (OUTPATIENT)
Dept: OPHTHALMOLOGY | Facility: CLINIC | Age: 79
End: 2019-03-05
Payer: MEDICARE

## 2019-03-05 DIAGNOSIS — Z96.1 PSEUDOPHAKIA OF BOTH EYES: Primary | ICD-10-CM

## 2019-03-05 PROCEDURE — 99024 POSTOP FOLLOW-UP VISIT: CPT | Performed by: OPHTHALMOLOGY

## 2019-03-05 ASSESSMENT — VISUAL ACUITY
OS_SC: 20/70
OD_SC: 20/300
METHOD: SNELLEN - LINEAR
OS_SC+: -1

## 2019-03-05 ASSESSMENT — TONOMETRY
IOP_METHOD: TONOPEN
OS_IOP_MMHG: 9
OD_IOP_MMHG: 9

## 2019-03-05 NOTE — PATIENT INSTRUCTIONS
Stop the eye drops in the left eye    Increase pink cap eye drop use it every hour today. Every 2 hours tomorrow, and every 3 hours after that and return in 1 week  Continue using the gray and tan cap as you are currently

## 2019-03-05 NOTE — PROGRESS NOTES
Assessment & Plan   Carlton Bravo is a 78 year old male who presents with:   Review of systems for the eyes was negative other than the pertinent positives and negatives noted in the HPI.  History is obtained from the patient.     Chief Complaint   Patient presents with     Post Op (Ophthalmology) Right Eye       Lab Results   Component Value Date    A1C 5.6 05/09/2018         Pseudophakia of both eyes  - 1 wk s/p CE PC IOL right eye  - Continued corneal swelling, increase PF as directed and continue acular and ketoralac four times daily  - 3 wk s/p CE PC IOL os. Discontinue all drops in the left eye.      Return in about 1 week (around 3/12/2019). (ok to cancel if vision improves    Documentation for today's encounter was performed by Mami Crow COA. OSC. Acting as a scribe in my presence. I have reviewed and verified that it is an accurate recording of today's encounter.    Attending Physician Attestation:  Complete documentation of historical and exam elements from today's encounter can be found in the full encounter summary report (not reduplicated in this progress note).  I personally obtained the chief complaint(s) and history of present illness.  I confirmed and edited as necessary the review of systems, past medical/surgical history, family history, social history, and examination findings as documented by others; and I examined the patient myself.  I personally reviewed the relevant tests, images, and reports as documented above.  I formulated and edited as necessary the assessment and plan and discussed the findings and management plan with the patient and family. - Rojas Brown MD

## 2019-03-12 ENCOUNTER — TELEPHONE (OUTPATIENT)
Dept: PEDIATRICS | Facility: CLINIC | Age: 79
End: 2019-03-12

## 2019-03-12 ENCOUNTER — TELEPHONE (OUTPATIENT)
Dept: OPHTHALMOLOGY | Facility: CLINIC | Age: 79
End: 2019-03-12

## 2019-03-12 NOTE — TELEPHONE ENCOUNTER
I called the patient to try to help him get scheduled for his Final Refraction with Dr. Lofton for approximately a month after his last cataract surgery. The patient stated he did not want to schedule anything at this time, and that he will call back if he feels he needs it in the future. I encouraged him that we aren't able to get him a glasses prescription if he needs it unless he comes in for the appointment. He stated that he still wasn't interested.  Nicolle Morris, Surgery Scheduling Coordinator

## 2019-03-12 NOTE — TELEPHONE ENCOUNTER
KACI Mcgrath calls stating INR is 1.5.  INR Goal  2-3    Patient is taking Warfarin - 4 mg one day a week and the rest at 2 mg a day     Bleeding or Clotting concerns no    Missed doses or extra doses - no    Recent illnesses or infections - no    New medications or discontinue of medications- no    Dietary or supplement/vitamin changes- Increased green leafy vegetables the last few days.    Call Ramila at [phone].    Tiana Joseph RN

## 2019-03-12 NOTE — TELEPHONE ENCOUNTER
[facility] Call Center    Phone Message    May a detailed message be left on voicemail: yes    Reason for Call: Other: [facility] 1.5 for patient which was drawn this morning. Please advise.      Action Taken: Message routed to:  Primary Care p [id number]

## 2019-03-19 ENCOUNTER — DOCUMENTATION ONLY (OUTPATIENT)
Dept: CARE COORDINATION | Facility: CLINIC | Age: 79
End: 2019-03-19

## 2019-03-19 NOTE — PROGRESS NOTES
[facility] utilizes an encounter to take the place of a direct phone call to your office. Please take a moment to review the below request. Please reply or route message to author of this encounter.  Message will act as a verbal OK of orders requested below. Thank you.    ORDER  SN 1x every other week x9, 4 prn    MD SUMMARY/PLAN OF CARE    SUMMARY TO MD  S/  78 yr old Male being recertified for home care services for ongoing coordination of care and INR/labs. Over past 60 days, Pt has remains stable and has had no falls.  Pt reports it being very taxing to leave home and difficult to get to clinic for INR checks. Was previously under Medicare episode related to multiple falls and wound. Since then wounds have healed and has INR has been stable.  Patient continues to drink a   c to 3/4 cup of alcohol each day.  Education has been provided on the risks of drinking, such as increase fall risk, increase for bleeding, and effect it has on coumadin. Patient continues to wish to drink therefore education was provided on trying to keep alcohol consumption equal each day, so alcohol does not interfere with Coumadin as much.  Denies any issues with anxiety or depression. IADL assistance is provided by delivery systems such as [employer] deliveries groceries and now currently [facility] deliveries medications. If patient is in need of leaving home,  has close friend that assistd with tiffany rea/ MD appointments. Medication reconciliation was completed patient is knowledgeable of medication dosing, frequency, route and purpose of all meds. In past month, pt has had cataract surgery on both eyes, and states vision is much better.  Has had some orthostatic hypotension in past 2 months, 2 readings were low.  today 96/60 sitting, 80/60 standing. Probably a bit dehydrated and continue to encourage fluids. HR regular today  B/  [place] [place] [facility] [facility] a fair support system in place.  history of GI bleed, falls,  traumatic wounds, idiopathic cardiomyopathy.  Pt refusing to see cardiologist at this time.  States had a cardiac ECHO at [facility] a few months ago.  A/  Pt is at risk for hospitalization r/t Chronic disease mgmt, hx of falls/fall risk, ETOH consumption on a daily basis and  dependent on others for IADL needs.  R/  SN visits every other week for 9 weeks, 4 prn visits,  Coordination of care, INRs, and vulnerability assessment d/t ETOH consumption.     Ramila Valverde RN,   [facility]  [phone]  [email or web address]

## 2019-03-26 ENCOUNTER — TELEPHONE (OUTPATIENT)
Dept: PEDIATRICS | Facility: CLINIC | Age: 79
End: 2019-03-26

## 2019-03-26 NOTE — TELEPHONE ENCOUNTER
[facility] Call Center    Phone Message    May a detailed message be left on voicemail: yes    Reason for Call: INR: Caller REPORTING results:  1.5 no missed dossage ok to leave v/msg    Action Taken: Message routed to:  Primary Care p [id number]

## 2019-03-26 NOTE — TELEPHONE ENCOUNTER
Patient INR  1.5   INR Goal  2-3    Patient is taking Warfarin 4 mg two days a week and the rest 2mg a day.    Bleeding or Clotting concerns no    Missed doses or extra doses no    Recent illnesses or infections no    New medications or discontinue of medications no    Dietary or supplement/vitamin changes no  Tiana Joseph RN

## 2019-04-02 ENCOUNTER — TELEPHONE (OUTPATIENT)
Dept: PEDIATRICS | Facility: CLINIC | Age: 79
End: 2019-04-02

## 2019-04-02 DIAGNOSIS — Z53.9 DIAGNOSIS NOT YET DEFINED: Primary | ICD-10-CM

## 2019-04-02 PROCEDURE — G0179 MD RECERTIFICATION HHA PT: HCPCS | Performed by: INTERNAL MEDICINE

## 2019-04-02 NOTE — TELEPHONE ENCOUNTER
Called Ramila,  left message with phone number to call back.     Need to ask [facility] questions.  Nisha Blanco RN, [facility]

## 2019-04-02 NOTE — TELEPHONE ENCOUNTER
[facility] Call Center    Phone Message    May a detailed message be left on voicemail: yes    Reason for Call: INR: Caller REPORTING results:  2.1     Action Taken: Message routed to:  Primary Care p [id number]

## 2019-04-02 NOTE — TELEPHONE ENCOUNTER
Ramila called back    Patient INR  2.1   INR Goal  2.0-3.0    Patient is taking Warfarin as directed.      4 mg Tues, Thurs, Sat   2 mg all other days    Bleeding or Clotting concerns no    Missed doses or extra doses no    Recent illnesses or infections no    New medications or discontinue of medications no    Dietary or supplement/vitamin changes no    Ramila is going to patient's home in 1 week then it will be every other week.  If ok with Dania Pettit NP, advise if ok to keep dose the same and recheck in 1 week.      Ramila is not expecting a call back unless change of plan.  Nisha Blanco RN, [facility]

## 2019-04-09 ENCOUNTER — TELEPHONE (OUTPATIENT)
Dept: PEDIATRICS | Facility: CLINIC | Age: 79
End: 2019-04-09

## 2019-04-09 NOTE — TELEPHONE ENCOUNTER
INR is 2.5  INR Goal is 2.0-3.0    Patient is taking Coumadin:   4 mg Tues, Thurs, Sat   2 mg all other days.    No changes    Last check was 4/2/19 and was to keep dose same and recheck in 1 week.    Routing to Dania Pettit NP to please advise on dosing and recheck.    Ramila's number is [phone]    Nisha Blanco RN, [facility]

## 2019-04-09 NOTE — TELEPHONE ENCOUNTER
[facility] Call Center    Phone Message    May a detailed message be left on voicemail: yes    Reason for Call: INR: Caller REPORTING results:  2.5     Patient took 4mg Tuesday, Thursday, and Saturday.  2mg all other days with no other changes.      Action Taken: Message routed to:  Primary Care p [id number]

## 2019-04-09 NOTE — TELEPHONE ENCOUNTER
Called Ramila, and gave message per Dania Pettit NP.  Patient verbalized understanding and agreement to plan. Nisha Blanco RN, [facility]

## 2019-04-30 ENCOUNTER — TELEPHONE (OUTPATIENT)
Dept: PEDIATRICS | Facility: CLINIC | Age: 79
End: 2019-04-30

## 2019-04-30 NOTE — TELEPHONE ENCOUNTER
Called Ramila and gave message per Dania Pettit NP.  Ramila  verbalized understanding and agreement to plan. Nisha Blanco RN, [facility]

## 2019-04-30 NOTE — TELEPHONE ENCOUNTER
Routing to Dania Pettit NP to please advise.    Nisha Blanco RN, [facility]

## 2019-04-30 NOTE — TELEPHONE ENCOUNTER
[facility] Call Center    Phone Message    May a detailed message be left on voicemail: no    Reason for Call: INR: Caller REPORTING results:  Today results 2.3  Pt took 4mg Tues, Thurs, Sat.   2mg all other days.  No other health changes.     Action Taken: Message routed to:  Primary Care p [id number]

## 2019-05-01 ENCOUNTER — PATIENT OUTREACH (OUTPATIENT)
Dept: GERIATRIC MEDICINE | Facility: CLINIC | Age: 79
End: 2019-05-01

## 2019-05-01 DIAGNOSIS — Z76.89 HEALTH CARE HOME: ICD-10-CM

## 2019-05-01 NOTE — PROGRESS NOTES
[facility] Care Coordination Contact    Member became effective with  [facility] on 4/1/19 - late enrollment per [facility]  Previous Health Plan: MA/Fee For Service  Previous Care System: [facility]  Previous care coordinators name and number: Colette [place]  Waiver Type: EW  Last MMIS Entry: Date 3/4/19 and Type 01 comm w/svc  MMIS visit date if different from above: N/A  Services Listed in MMIS:   Member currently receiving the following home care services:     Member currently receiving the following community resources:    UTF received: Yes: Received and saved to member file     Wendy Hernandez  Case Management Specialist   [facility]   [phone]

## 2019-05-01 NOTE — PROGRESS NOTES
5/1/19: Care Coordinator received UTF information from previous Care Coordinator - Member was assessed by Carlos Gaitan/[facility] Carl in 3/2019.  Member was opened to EW.  Services were not started since member was to be enrolled in Managed Care.       Care Coordinator placed call to member to f/u  - he states that he prefers Care Coordinator speak with his friend/POA, Reggie Aundrea and then Reggie relays everything to him.  Care Coordinator placed call to Reggie and left voice message with Care Coordinator information.     Merari Reyna RN, N  [facility]

## 2019-05-01 NOTE — PROGRESS NOTES
4/29/19: Care Coordinator was notified on 4/29/19 of member enrollment - DHS retro per [employer] to 4/1/19.  [facility] notified on 4/29/19.          Care Coordinator placed call to member to introduce self and role.  Member states that he would like to talk with Ramila, his RN with [facility] tomorrow regarding Care Coordinator and also states that he has a friend/POA that handles everything - Reggie Guillaume. Member will talk with Ramila and Care Coordinator will f/u.     Merari Reyna, RN, N  [facility]

## 2019-05-01 NOTE — LETTER
May 1, 2019    CARLTON FERRELL  [address]  [place] [place] [address]      Dear Carlton,    Welcome to [facility] ([facility]) health program. My name is Merari Reyna RN, PHN. I am your [facility] care coordinator.     I will call you soon to see how you are doing and determine what needs you may have. My job is to help connect you to services, complete an assessment, and develop a care plan with you. There is no charge to you for the care coordination and assessment services. Our goal is to keep you as healthy and independent as possible.     [facility] combines the benefits you may already receive from Medical Assistance, Medicare and the Prescription Drug Coverage Program.    Soon you will receive a new [facility] member identification (ID) card from [facility]. When you receive it, please use this card where you get your health services.]    If you have questions, please call me at [phone]. If you reach my voice mail, leave a message and your phone number. If you are hearing impaired, please call the [place] Relay at [phone] or [phone] (xrnuka-yu-soqade relay service).    Sincerely,    Merari Reyna RN, PHN    E-mail: [email or web address]  Phone: [phone]      [facility] is a health plan that contracts with both Medicare and the [place] Medical Assistance (Medicaid) program to provide benefits of both programs to enrollees. Enrollment in [facility] depends on contract renewal.    [facility] [id number] DHS Approved ([id number])  [id number] (11/18)

## 2019-05-05 DIAGNOSIS — E78.5 DYSLIPIDEMIA: ICD-10-CM

## 2019-05-06 RX ORDER — PRAVASTATIN SODIUM 80 MG/1
TABLET ORAL
Qty: 90 TABLET | Refills: 0 | Status: SHIPPED | OUTPATIENT
Start: 2019-05-06 | End: 2019-08-14

## 2019-05-06 NOTE — TELEPHONE ENCOUNTER
Please refer to RN refill guidelines. Refilled per protocol.    Madeline Brown RN   [facility]

## 2019-05-16 ENCOUNTER — ANTICOAGULATION THERAPY VISIT (OUTPATIENT)
Dept: PHARMACY | Facility: CLINIC | Age: 79
End: 2019-05-16

## 2019-05-16 DIAGNOSIS — I48.20 CHRONIC ATRIAL FIBRILLATION (H): ICD-10-CM

## 2019-05-16 DIAGNOSIS — Z79.01 LONG TERM CURRENT USE OF ANTICOAGULANT THERAPY: ICD-10-CM

## 2019-05-21 ENCOUNTER — ANTICOAGULATION THERAPY VISIT (OUTPATIENT)
Dept: PEDIATRICS | Facility: CLINIC | Age: 79
End: 2019-05-21
Payer: COMMERCIAL

## 2019-05-21 ENCOUNTER — DOCUMENTATION ONLY (OUTPATIENT)
Dept: CARE COORDINATION | Facility: CLINIC | Age: 79
End: 2019-05-21

## 2019-05-21 DIAGNOSIS — I48.20 CHRONIC ATRIAL FIBRILLATION (H): ICD-10-CM

## 2019-05-21 DIAGNOSIS — Z79.01 LONG TERM CURRENT USE OF ANTICOAGULANT THERAPY: ICD-10-CM

## 2019-05-21 LAB — INR PPP: 2.2 (ref 0.8–1.1)

## 2019-05-21 PROCEDURE — 99207 ZZC NO CHARGE NURSE ONLY: CPT | Performed by: NURSE PRACTITIONER

## 2019-05-21 NOTE — PROGRESS NOTES
ANTICOAGULATION FOLLOW-UP CLINIC VISIT    Patient Name:  Carlton Bravo  Date:  2019  Contact Type:  Telephone/ PAMELLA Mcgrath Home Care Nurse    SUBJECTIVE:  Patient Findings         Clinical Outcomes     Negatives:   Major bleeding event, Thromboembolic event, Anticoagulation-related hospital admission, Anticoagulation-related ED visit, Anticoagulation-related fatality           OBJECTIVE    INR   Date Value Ref Range Status   2019 2.2 (A) 0.8 - 1.1 Final       ASSESSMENT / PLAN  INR assessment THER    Recheck INR In: 3 WEEKS    INR Location Homecare INR      Anticoagulation Summary  As of 2019    INR goal:   2.0-3.0   TTR:   62.7 % (3.4 y)   INR used for dosin.2 (2019)   Warfarin maintenance plan:   4 mg (2 mg x 2) every Tue, Thu, Sat; 2 mg (2 mg x 1) all other days   Full warfarin instructions:   4 mg every Tue, Thu, Sat; 2 mg all other days   Weekly warfarin total:   20 mg   No change documented:   Wilda Metcalf RN   Plan last modified:   Nisha Blanco RN (2019)   Next INR check:   2019   Priority:   INR   Target end date:       Indications    Atrial fibrillation (HCC) [I48.91] [I48.91]  Long term current use of anticoagulant therapy [Z79.01]             Anticoagulation Episode Summary     INR check location:       Preferred lab:       Send INR reminders to:   [facility]    Comments:   Take in AM. possible chromogenic 10/antiphosholipid syndrome            See the Encounter Report to view Anticoagulation Flowsheet and Dosing Calendar (Go to Encounters tab in chart review, and find the Anticoagulation Therapy Visit)    The patient was assessed for diet, medication, and activity level changes, missed or extra doses, bruising or bleeding, with no problem findings.      Wilda Metcalf RN

## 2019-05-21 NOTE — PROGRESS NOTES
[facility] utilizes an encounter to take the place of a direct phone call to your office. Please take a moment to review the below request. Please reply or route message to author of this encounter.  Message will act as a verbal OK of orders requested below. Thank you.    ORDER  SN [id number], [id number], 4 prn  EL schneider MD SUMMARY/PLAN OF CARE  SUMMARY TO MD  S/  78 yr old Male being recertified for home care services for ongoing coordination of care and INR/labs. Over past 60 days, Pt has remains stable and has had no falls.  Pt reports it being very taxing to leave home and difficult to get to clinic for INR checks. Patient continues to drink a   c to 3/4 cup of alcohol each day.  Education has been provided on the risks of drinking, such as increase fall risk, increase for bleeding, and effect it has on coumadin. Patient continues to wish to drink therefore education was provided on trying to keep alcohol consumption equal each day, so alcohol does not interfere with Coumadin as much.  Denies any issues with anxiety or depression. IADL assistance is provided by delivery systems such as [employer] and will have [facility] deliver medications. Pt was using [employer] mail order,  but was having difficulty with their online format, which is why we are switching back to [facility] If patient is in need of leaving home, has close friend that assistd with tiffany rea/ MD appointments. Medication reconciliation was completed patient is knowledgeable of medication dosing, frequency, route and purpose of all meds. Pt is stable at this time.  Pt received unexpected bill from homecare of >$1500.  Even though MA authorized all SN visits, they denied payment due to spend down.  Will need to get non-billable service for SW to see if Community care mikayla for both homecare and clinic/hospital.   B/  Lives alone in [place] with a fair support system in place.  history of GI bleed, falls, traumatic wounds,  idiopathic cardiomyopathy.  Pt refusing to see cardiologist at this time.  States had a cardiac ECHO at [facility] [place] a few months ago.  A/  Pt is at risk for hospitalization r/t Chronic disease mgmt, hx of falls/fall risk, ETOH consumption on a daily basis and  dependent on others for IADL needs.  R/  SN visits [id number], [id number], 4 prn visits,  Coordination of care, INRs, and vulnerability assessment d/t ETOH consumption. non billable SW to help pt fill out community care application for home care and clinics/hospitals.     Ramila Valverde RN,   [facility]  [phone]  [email or web address]

## 2019-05-23 DIAGNOSIS — I48.20 CHRONIC ATRIAL FIBRILLATION (H): ICD-10-CM

## 2019-05-23 RX ORDER — WARFARIN SODIUM 2 MG/1
TABLET ORAL
Qty: 180 TABLET | Refills: 0 | Status: SHIPPED | OUTPATIENT
Start: 2019-05-23 | End: 2020-02-25

## 2019-05-23 NOTE — TELEPHONE ENCOUNTER
Last INR was on 5/21. LOV- 2/14. Prescription approved per refill protocol.    Tiana Joseph RN

## 2019-05-23 NOTE — TELEPHONE ENCOUNTER
Hello,    Pt wants to  rx here now, need new rx    Thank You,  Dania Carrizales  Pharmacy Technician  [facility]  [phone]

## 2019-05-30 NOTE — PROGRESS NOTES
5/8/19: Care Coordinator spoke with member.  He states that he received a bill from [facility].   He is confused as he was told he would not receive any bills.  He has also called KACI Mcgrath with [facility] re: this bill.     Care Coordinator placed call to  Billing - spoke with Ruthy.   She reviewed bills and states that it is member MA spenddown so therefore she believes he needs to pay these bills.  She referred Care Coordinator to Notrees at [phone] to confirm.  Care Coordinator left vm.     Care Coordinator placed call to [facility] - spoke with Roger - confirmed that MA spenddown of $766 and Ew obligation of $622.       Member also asked about have medications mailed.  Care Coordinator explained that Reggie and Care Coordinator discussed this and plan is for Kaci Mcgrath with [facility] to talk to member and assist.      Per Reggie he wants Care Coordinator to discuss everything with Reggie, Friend, as he knows more of what is going on.   Care Coordinator notified Reggie of bill and pharmacy via email.     Care Coordinator left  for Ramila re: bills and pharmacy.     Care Coordinator received vm back stating she would discuss with member at next visit.     Merari Reyna RN, N  [facility]

## 2019-05-30 NOTE — PROGRESS NOTES
5/23/19: Care Coordinator reached out to Reggie regarding bathroom remodel bids - no response back.     Merari Reyna RN, PHN  [facility]

## 2019-05-30 NOTE — PROGRESS NOTES
5/6/19: Care Coordinator has not received call back from friend, Reggie.  Care Coordinator placed call to Reggie.  Spoke with him and introduced self and role and that member preferred Care Coordinator speak with Reggie as member gets overwhelmed.   Per Reggie and member they do not want new assessment or a visit at this time as just assessment in March.   Reviewed asessment with both Reggie and member.  Priority is bathroom modifications, then possibly , and wondering if Antonio Mcgrath with [facility] can assist with move to pharmacy that can mail medications.  Per discussion at this time we will pursue obtaining bathroom bids.  Member has difficulty getting into tub and would like walk out shower. He does not want a HHA at this time to assist.  Member has a $622 EW obligation so we discussed the bathroom remodel, cleaning etc.  At this time will pursue the bathroom bids and discuss cleaning/homemaking after.   Reggie prefers providers to call him.     Care Coordinator made referral to:   1. [facility] - [phone]  2. [employer]  - Víctor Starks  [phone]      They will contact Reggie.     Care Coordinator completed Trans HRA and will end CM change into MMIS.     Merari Reyna, RN, N  [facility]

## 2019-05-30 NOTE — PROGRESS NOTES
5/29/19: Care Coordinator placed call to [employer] re: bid - left voice message.   Spoke with Víctor Starks - he states he reached out but has not heard back - he will f/u today.     Merari Reyna RN, PHN  [facility]

## 2019-05-31 DIAGNOSIS — Z53.9 DIAGNOSIS NOT YET DEFINED: Primary | ICD-10-CM

## 2019-05-31 PROCEDURE — G0179 MD RECERTIFICATION HHA PT: HCPCS | Performed by: INTERNAL MEDICINE

## 2019-05-31 NOTE — PROGRESS NOTES
5/31/19: Care Coordinator received call back from Anderson at [employer] stating that he has called a few times but has not heard back from member/Reggie.   He will try again.     Merari Reyna RN, PHN  [facility]

## 2019-06-04 ENCOUNTER — TELEPHONE (OUTPATIENT)
Dept: PEDIATRICS | Facility: CLINIC | Age: 79
End: 2019-06-04

## 2019-06-04 NOTE — TELEPHONE ENCOUNTER
[facility] Call Center    Phone Message    May a detailed message be left on voicemail: yes    Reason for Call: Order(s): Home Care Orders: Other: Social Work Order Extension (Modesta states it typically gets extended for a month)    Action Taken: Message routed to:  Primary Care p [phone]

## 2019-06-05 NOTE — PROGRESS NOTES
6/3/19: Care Coordinator sent f/u email to POA/friend, Pradeep, re: bathroom remodel.  As well as placed phone call - unable to leave .     Merari Reyna RN, PHN  [facility]

## 2019-06-06 NOTE — PROGRESS NOTES
6/5/19: Care Coordinator received vm from Modesta GALLEGOS, with [facility] assisting member with [facility] bills prior to member enrollment into [facility] - Jan Feb March.   Looking for information re: spend down.  Care Coordinator placed call back and left vm for Modesta.     Merari Reyna, RN, N  [facility]

## 2019-06-11 ENCOUNTER — ANTICOAGULATION THERAPY VISIT (OUTPATIENT)
Dept: PEDIATRICS | Facility: CLINIC | Age: 79
End: 2019-06-11
Payer: COMMERCIAL

## 2019-06-11 DIAGNOSIS — I48.20 CHRONIC ATRIAL FIBRILLATION (H): ICD-10-CM

## 2019-06-11 DIAGNOSIS — Z79.01 LONG TERM CURRENT USE OF ANTICOAGULANT THERAPY: ICD-10-CM

## 2019-06-11 LAB — INR PPP: 4.2 (ref 0.8–1.1)

## 2019-06-11 PROCEDURE — 99207 ZZC NO CHARGE NURSE ONLY: CPT | Performed by: NURSE PRACTITIONER

## 2019-06-11 NOTE — PROGRESS NOTES
ANTICOAGULATION FOLLOW-UP CLINIC VISIT    Patient Name:  Carlton Bravo  Date:  2019  Contact Type:  Telephone/ PAMELLA Mcgrath home health nurse    SUBJECTIVE:  Patient Findings     Positives:   Change in alcohol use    Comments:   Went from drinking 1/2 a cup of alcohol to 1 cup of alcohol. Plan to resume increase.        Clinical Outcomes     Negatives:   Major bleeding event, Thromboembolic event, Anticoagulation-related hospital admission, Anticoagulation-related ED visit, Anticoagulation-related fatality    Comments:   Went from drinking 1/2 a cup of alcohol to 1 cup of alcohol. Plan to resume increase.           OBJECTIVE    INR   Date Value Ref Range Status   2019 4.2 (A) 0.8 - 1.1 Final       ASSESSMENT / PLAN  INR assessment SUPRA    Recheck INR In: 1 WEEK    INR Location Home INR      Anticoagulation Summary  As of 2019    INR goal:   2.0-3.0   TTR:   62.3 % (3.4 y)   INR used for dosin.2! (2019)   Warfarin maintenance plan:   3 mg (2 mg x 1.5) every Mon, Wed, Fri; 2 mg (2 mg x 1) all other days   Full warfarin instructions:   : Hold; Otherwise 3 mg every Mon, Wed, Fri; 2 mg all other days   Weekly warfarin total:   17 mg   Plan last modified:   Wilda Metcalf RN (2019)   Next INR check:   2019   Priority:   INR   Target end date:       Indications    Atrial fibrillation (HCC) [I48.91] [I48.91]  Long term current use of anticoagulant therapy [Z79.01]             Anticoagulation Episode Summary     INR check location:       Preferred lab:       Send INR reminders to:   [facility]    Comments:   Take in AM. possible chromogenic 10/antiphosholipid syndrome            See the Encounter Report to view Anticoagulation Flowsheet and Dosing Calendar (Go to Encounters tab in chart review, and find the Anticoagulation Therapy Visit)    Patient increased his alcohol consumption from 1/2 a glass daily to 1 full glass. He plans to continue this change. Hold tonight's dose and  then decrease maintenance plan by 15%.  (3 mg MWF and 2 mg ROW)    Wilda Metcalf RN

## 2019-06-18 ENCOUNTER — ANTICOAGULATION THERAPY VISIT (OUTPATIENT)
Dept: PEDIATRICS | Facility: CLINIC | Age: 79
End: 2019-06-18
Payer: COMMERCIAL

## 2019-06-18 DIAGNOSIS — Z79.01 LONG TERM CURRENT USE OF ANTICOAGULANT THERAPY: ICD-10-CM

## 2019-06-18 DIAGNOSIS — I48.20 CHRONIC ATRIAL FIBRILLATION (H): ICD-10-CM

## 2019-06-18 LAB — INR PPP: 3.2

## 2019-06-18 PROCEDURE — 99207 ZZC NO CHARGE NURSE ONLY: CPT | Performed by: NURSE PRACTITIONER

## 2019-06-18 NOTE — PROGRESS NOTES
ANTICOAGULATION FOLLOW-UP CLINIC VISIT    Patient Name:  Carlton Bravo  Date:  6/18/2019  Contact Type:  Telephone/ [facility] Nurse, Ramila    SUBJECTIVE:  Patient Findings         Clinical Outcomes     Negatives:   Major bleeding event, Thromboembolic event, Anticoagulation-related hospital admission, Anticoagulation-related ED visit, Anticoagulation-related fatality           OBJECTIVE    INR   Date Value Ref Range Status   06/18/2019 3.2  Final       ASSESSMENT / PLAN  No question data found.  Anticoagulation Summary  As of 6/18/2019    INR goal:   2.0-3.0   TTR:   62.0 % (3.5 y)   INR used for dosing:   3.2! (6/18/2019)   Warfarin maintenance plan:   3 mg (2 mg x 1.5) every Mon; 2 mg (2 mg x 1) all other days   Full warfarin instructions:   3 mg every Mon; 2 mg all other days   Weekly warfarin total:   15 mg   Plan last modified:   Wilda Metcalf RN (6/18/2019)   Next INR check:   6/25/2019   Priority:   INR   Target end date:       Indications    Atrial fibrillation (HCC) [I48.91] [I48.91]  Long term current use of anticoagulant therapy [Z79.01]             Anticoagulation Episode Summary     INR check location:       Preferred lab:       Send INR reminders to:   [facility]    Comments:   Take in AM. possible chromogenic 10/antiphosholipid syndrome            See the Encounter Report to view Anticoagulation Flowsheet and Dosing Calendar (Go to Encounters tab in chart review, and find the Anticoagulation Therapy Visit)    The patient was assessed for diet, medication, and activity level changes, missed or extra doses, bruising or bleeding, with no problem findings.  Was taking 3mg MF and 2 mg ROW. Lower dose to 3 mg M and 2 mg ROW. Recheck in 1 week.    Wilda Metcalf, KACI

## 2019-06-25 ENCOUNTER — ANTICOAGULATION THERAPY VISIT (OUTPATIENT)
Dept: PEDIATRICS | Facility: CLINIC | Age: 79
End: 2019-06-25
Payer: COMMERCIAL

## 2019-06-25 DIAGNOSIS — Z79.01 LONG TERM CURRENT USE OF ANTICOAGULANT THERAPY: ICD-10-CM

## 2019-06-25 DIAGNOSIS — I48.20 CHRONIC ATRIAL FIBRILLATION (H): ICD-10-CM

## 2019-06-25 LAB — INR PPP: 2.6

## 2019-06-25 PROCEDURE — 99207 ZZC NO CHARGE NURSE ONLY: CPT | Performed by: NURSE PRACTITIONER

## 2019-06-25 NOTE — PROGRESS NOTES
6/7/19: Care Coordinator received email from friend/Reggie MCDANIELS, apologizing stating he has been swamped at work the past few weeks and didn't get back to this Care Coordinator.   He states they have a contractor scheduled to visit and will keep Care Coordinator up to date.   He states he is also aware of the bills and spenddown.      Merari Reyna RN, N  [facility]

## 2019-06-25 NOTE — PROGRESS NOTES
ANTICOAGULATION FOLLOW-UP CLINIC VISIT    Patient Name:  Carlton Bravo  Date:  2019  Contact Type:  Telephone/ PAMELLA Mcgrath homecare Nurse    SUBJECTIVE:  Patient Findings         Clinical Outcomes     Negatives:   Major bleeding event, Thromboembolic event, Anticoagulation-related hospital admission, Anticoagulation-related ED visit, Anticoagulation-related fatality           OBJECTIVE    INR   Date Value Ref Range Status   2019 2.6  Final       ASSESSMENT / PLAN  INR assessment THER    Recheck INR In: 2 WEEKS    INR Location Homecare INR      Anticoagulation Summary  As of 2019    INR goal:   2.0-3.0   TTR:   62.0 % (3.5 y)   INR used for dosin.6 (2019)   Warfarin maintenance plan:   3 mg (2 mg x 1.5) every Mon; 2 mg (2 mg x 1) all other days   Full warfarin instructions:   3 mg every Mon; 2 mg all other days   Weekly warfarin total:   15 mg   No change documented:   Wilda Metcalf, RN   Plan last modified:   Wilda Metcalf RN (2019)   Next INR check:   2019   Priority:   INR   Target end date:       Indications    Atrial fibrillation (HCC) [I48.91] [I48.91]  Long term current use of anticoagulant therapy [Z79.01]             Anticoagulation Episode Summary     INR check location:       Preferred lab:       Send INR reminders to:   MIGUEL ANGEL DALEY    Comments:   Take in AM. possible chromogenic 10/antiphosholipid syndrome            See the Encounter Report to view Anticoagulation Flowsheet and Dosing Calendar (Go to Encounters tab in chart review, and find the Anticoagulation Therapy Visit)    Patient cannot afford to pay for home care services and has transportation issues. Interested in pursuing a home monitor.  Initiated the process for [employer]. They will contact him after verifying plan to see if the patient would like to pursue.   Addendum: Ramila  Nurse, called back.  They think he may be covered for HC under another carrier. They will call back  if they want to pursue [employer] home monitoring services.     Wilda Metcalf RN

## 2019-06-25 NOTE — PROGRESS NOTES
6/25/19: Care Coordinator received notification re: members bills from Jan, Feb, March prior to EW and prior to enrollment with [facility].  Per Supervisor, Jeannette Schilling, and per conversation with [facility] RNRamila, home care may end due to bills.   Care Coordinator explained past conversations re: billing with Jeannette.  Care Coordinator placed call to [facility] at [phone] - spoke with Unique.   She verified that outstanding bills are from Jan, Feb, and March.  She shows that member was with [facility] 4/1.   She states that April 2019 bills were paid in full.  May bill still pending with [facility] and June has not been billed.       Care Coordinator placed call and left vm for [facility] RNRamila, for call back regarding this.     Care Coordinator received call back from KACI Mcgrath - she will not discharge from home care.  She will f/u with EL Byers, re: oni application for previous bills and assist him with this as prior to [facility] and [facility] enrollment.   Ramila will relay to member.      Merari Reyna, RN, PHN  [facility]

## 2019-06-27 NOTE — PROGRESS NOTES
Care Coordinator was cc'd on text message from EL Byers, to Reggie, friend/POA stating that further financial information is needed to complete the home care oni application.  Per Modesta - member asked her to reach out to Reggie.     Merari Reyna RN, N  [facility]

## 2019-07-09 ENCOUNTER — ANTICOAGULATION THERAPY VISIT (OUTPATIENT)
Dept: PHARMACY | Facility: CLINIC | Age: 79
End: 2019-07-09
Payer: COMMERCIAL

## 2019-07-09 DIAGNOSIS — I48.20 CHRONIC ATRIAL FIBRILLATION (H): ICD-10-CM

## 2019-07-09 DIAGNOSIS — Z79.01 LONG TERM CURRENT USE OF ANTICOAGULANT THERAPY: ICD-10-CM

## 2019-07-09 LAB — INR PPP: 2.4

## 2019-07-09 PROCEDURE — 99207 ZZC NO CHARGE NURSE ONLY: CPT | Performed by: NURSE PRACTITIONER

## 2019-07-09 NOTE — PROGRESS NOTES
ANTICOAGULATION FOLLOW-UP CLINIC VISIT    Patient Name:  Carlton Bravo  Date:  2019  Contact Type:  Telephone/ [facility] RN    SUBJECTIVE:  Patient Findings         Clinical Outcomes     Negatives:   Major bleeding event, Thromboembolic event, Anticoagulation-related hospital admission, Anticoagulation-related ED visit, Anticoagulation-related fatality           OBJECTIVE    INR   Date Value Ref Range Status   2019 2.4  Final       ASSESSMENT / PLAN  INR assessment THER    Recheck INR In: 2 WEEKS    INR Location Homecare INR      Anticoagulation Summary  As of 2019    INR goal:   2.0-3.0   TTR:   62.4 % (3.5 y)   INR used for dosin.4 (2019)   Warfarin maintenance plan:   3 mg (2 mg x 1.5) every Mon; 2 mg (2 mg x 1) all other days   Full warfarin instructions:   3 mg every Mon; 2 mg all other days   Weekly warfarin total:   15 mg   No change documented:   Nisha Blanco RN   Plan last modified:   Wilda Metcalf RN (2019)   Next INR check:   2019   Priority:   INR   Target end date:       Indications    Atrial fibrillation (HCC) [I48.91] [I48.91]  Long term current use of anticoagulant therapy [Z79.01]             Anticoagulation Episode Summary     INR check location:       Preferred lab:       Send INR reminders to:   MIGUEL ANGEL DALEY    Comments:   Take in AM. possible chromogenic 10/antiphosholipid syndrome            See the Encounter Report to view Anticoagulation Flowsheet and Dosing Calendar (Go to Encounters tab in chart review, and find the Anticoagulation Therapy Visit)        Nisha Blanco RN

## 2019-07-19 ENCOUNTER — ANTICOAGULATION THERAPY VISIT (OUTPATIENT)
Dept: PEDIATRICS | Facility: CLINIC | Age: 79
End: 2019-07-19
Payer: COMMERCIAL

## 2019-07-19 ENCOUNTER — TELEPHONE (OUTPATIENT)
Dept: PEDIATRICS | Facility: CLINIC | Age: 79
End: 2019-07-19

## 2019-07-19 ENCOUNTER — DOCUMENTATION ONLY (OUTPATIENT)
Dept: CARE COORDINATION | Facility: CLINIC | Age: 79
End: 2019-07-19

## 2019-07-19 DIAGNOSIS — I48.20 CHRONIC ATRIAL FIBRILLATION (H): Primary | ICD-10-CM

## 2019-07-19 DIAGNOSIS — Z79.01 LONG TERM CURRENT USE OF ANTICOAGULANT THERAPY: ICD-10-CM

## 2019-07-19 DIAGNOSIS — I48.20 CHRONIC ATRIAL FIBRILLATION (H): ICD-10-CM

## 2019-07-19 LAB — INR PPP: 4.7

## 2019-07-19 PROCEDURE — 99207 ZZC NO CHARGE NURSE ONLY: CPT | Performed by: NURSE PRACTITIONER

## 2019-07-19 NOTE — TELEPHONE ENCOUNTER
INR Referral pended for NI Lan, CNP, to review and sign.    Wilda Metcalf RN on 7/19/2019 at 10:18 AM

## 2019-07-19 NOTE — PROGRESS NOTES
ANTICOAGULATION FOLLOW-UP CLINIC VISIT    Patient Name:  Carlton Bravo  Date:  2019  Contact Type:  Telephone- Ramila CAN Home Care RN    SUBJECTIVE:  Patient Findings     Positives:   Change in diet/appetite    Comments:   Has not been eating any greens for a few weeks.  Normally consumes alcohol daily, states no increase in amount.        Clinical Outcomes     Negatives:   Major bleeding event, Thromboembolic event, Anticoagulation-related hospital admission, Anticoagulation-related ED visit, Anticoagulation-related fatality    Comments:   Has not been eating any greens for a few weeks.  Normally consumes alcohol daily, states no increase in amount.           OBJECTIVE    INR   Date Value Ref Range Status   2019 4.7  Final       ASSESSMENT / PLAN  INR assessment THER    Recheck INR In: 3 WEEKS    INR Location Homecare INR      Anticoagulation Summary  As of 2019    INR goal:   2.0-3.0   TTR:   62.1 % (3.6 y)   INR used for dosin.7! (2019)   Warfarin maintenance plan:   3 mg (2 mg x 1.5) every Mon; 2 mg (2 mg x 1) all other days   Full warfarin instructions:   : Hold; : Hold; : 2 mg; Otherwise 3 mg every Mon; 2 mg all other days   Weekly warfarin total:   15 mg   Plan last modified:   Wilda Metcalf, RN (2019)   Next INR check:   2019   Priority:   INR   Target end date:       Indications    Atrial fibrillation (HCC) [I48.91] [I48.91]  Long term current use of anticoagulant therapy [Z79.01]             Anticoagulation Episode Summary     INR check location:       Preferred lab:       Send INR reminders to:   MIGUEL ANGEL DALEY    Comments:   Take in AM. possible chromogenic 10/antiphosholipid syndrome            See the Encounter Report to view Anticoagulation Flowsheet and Dosing Calendar (Go to Encounters tab in chart review, and find the Anticoagulation Therapy Visit)    Patient eating less greens than normal. No other problem findings. Continues to consume  alcohol daily, no increase in amount per patient.    Already took dose today. Hold Sat/Sun then 2 mg daily. Recheck Thursday 7/25.      Wilda Metcalf RN

## 2019-07-19 NOTE — PROGRESS NOTES
[facility] utilizes an encounter to take the place of a direct phone call to your office. Please take a moment to review the below request. Please reply or route message to author of this encounter.  Message will act as a verbal OK of orders requested below. Thank you.    ORDER, SN 1xmx1m, 4jgnt4yi, 4 as needed    MD SUMMARY/PLAN OF CARE  SUMMARY TO MD  S/  79 yr old Male being recertified for home care services for ongoing coordination of care and INR/labs. Over past 60 days, Pt has remains stable and has had no falls.  Pt reports it being very taxing to leave home and difficult to get to clinic for INR checks. Patient continues to drink a   c to 3/4 cup of alcohol each day. Denies any issues with anxiety or depression. IADL assistance is provided by delivery systems such as [employer] and will have [facility] deliver medications. Pt has close friend that assistd with tiffany rea/ MD appointments. Medication reconciliation was completed patient is knowledgeable of medication dosing, frequency, route and purpose of all meds. Pt is stable at this time.  This past cert period, SW met with pt and helped him fill out community care application that will hopefully take care of his large bill.  In the meantime, we talked to insurance who will agree to visits with no OOP expense and pt very grateful. Have reminded pt that he needs to go in for cardiology RV/follow up/ yearly exam, but pt currently refusing.   B/  Lives alone in [facility] with a fair support system in place.  history of GI bleed, falls, traumatic wounds, idiopathic cardiomyopathy.  Pt refusing to see cardiologist at this time.  States had a cardiac ECHO at [facility] a few months ago.  A/  Pt is at risk for hospitalization r/t Chronic disease mgmt, hx of falls/fall risk, ETOH consumption on a daily basis and  dependent on others for IADL needs.  R/  SN visits 8buor9qv, 4gosz4ko, 4 prn visits,  Coordination of care, INRs,  and vulnerability assessment d/t ETOH consumption.     Ramila Valverde RN,   [facility]  [phone]  [email or web address]

## 2019-07-25 ENCOUNTER — ANTICOAGULATION THERAPY VISIT (OUTPATIENT)
Dept: PEDIATRICS | Facility: CLINIC | Age: 79
End: 2019-07-25
Payer: COMMERCIAL

## 2019-07-25 DIAGNOSIS — Z79.01 LONG TERM CURRENT USE OF ANTICOAGULANT THERAPY: ICD-10-CM

## 2019-07-25 DIAGNOSIS — I48.20 CHRONIC ATRIAL FIBRILLATION (H): ICD-10-CM

## 2019-07-25 LAB — INR PPP: 3.7

## 2019-07-25 PROCEDURE — 99207 ZZC NO CHARGE NURSE ONLY: CPT | Performed by: NURSE PRACTITIONER

## 2019-07-25 NOTE — PROGRESS NOTES
ANTICOAGULATION FOLLOW-UP CLINIC VISIT    Patient Name:  Carlton Bravo  Date:  7/25/2019  Contact Type:  Telephone    SUBJECTIVE:  Patient Findings         Clinical Outcomes     Negatives:   Major bleeding event, Thromboembolic event, Anticoagulation-related hospital admission, Anticoagulation-related ED visit, Anticoagulation-related fatality           OBJECTIVE    INR   Date Value Ref Range Status   07/25/2019 3.7  Final       ASSESSMENT / PLAN  No question data found.  Anticoagulation Summary  As of 7/25/2019    INR goal:   2.0-3.0   TTR:   61.8 % (3.6 y)   INR used for dosing:   3.7! (7/25/2019)   Warfarin maintenance plan:   1 mg (2 mg x 0.5) every Mon, Fri; 2 mg (2 mg x 1) all other days   Full warfarin instructions:   1 mg every Mon, Fri; 2 mg all other days   Weekly warfarin total:   12 mg   Plan last modified:   Wilda Metcalf RN (7/25/2019)   Next INR check:   7/30/2019   Priority:   INR   Target end date:       Indications    Atrial fibrillation (HCC) [I48.91] [I48.91]  Long term current use of anticoagulant therapy [Z79.01]             Anticoagulation Episode Summary     INR check location:       Preferred lab:       Send INR reminders to:   MIGUEL ANGEL DALEY    Comments:   Take in AM. possible chromogenic 10/antiphosholipid syndrome            See the Encounter Report to view Anticoagulation Flowsheet and Dosing Calendar (Go to Encounters tab in chart review, and find the Anticoagulation Therapy Visit)    The patient was assessed for diet, medication, and activity level changes, missed or extra doses, bruising or bleeding, with no problem findings.    INR remains supra despite 2 dose held. No changes noted. Patient will consume extra vitamin k tonight. Maintenance dose lower by 2 mg (20%). Recheck in 5 days.    Wilda Metcalf, KACI

## 2019-07-29 DIAGNOSIS — Z53.9 DIAGNOSIS NOT YET DEFINED: Primary | ICD-10-CM

## 2019-07-29 PROCEDURE — G0179 MD RECERTIFICATION HHA PT: HCPCS | Performed by: INTERNAL MEDICINE

## 2019-07-30 ENCOUNTER — TELEPHONE (OUTPATIENT)
Dept: PHARMACY | Facility: CLINIC | Age: 79
End: 2019-07-30

## 2019-07-31 LAB — INR PPP: 2.8

## 2019-08-01 ENCOUNTER — ANTICOAGULATION THERAPY VISIT (OUTPATIENT)
Dept: PEDIATRICS | Facility: CLINIC | Age: 79
End: 2019-08-01
Payer: COMMERCIAL

## 2019-08-01 DIAGNOSIS — Z79.01 LONG TERM CURRENT USE OF ANTICOAGULANT THERAPY: ICD-10-CM

## 2019-08-01 DIAGNOSIS — I48.20 CHRONIC ATRIAL FIBRILLATION (H): ICD-10-CM

## 2019-08-01 PROCEDURE — 99207 ZZC NO CHARGE NURSE ONLY: CPT | Performed by: NURSE PRACTITIONER

## 2019-08-01 NOTE — PROGRESS NOTES
ANTICOAGULATION FOLLOW-UP CLINIC VISIT    Patient Name:  Carlton Bravo  Date:  2019  Contact Type:  Telephone/ KELLY [facility] Nurse    SUBJECTIVE:  Patient Findings         Clinical Outcomes     Negatives:   Major bleeding event, Thromboembolic event, Anticoagulation-related hospital admission, Anticoagulation-related ED visit, Anticoagulation-related fatality           OBJECTIVE    INR   Date Value Ref Range Status   2019 2.8  Corrected       ASSESSMENT / PLAN  No question data found.  Anticoagulation Summary  As of 2019    INR goal:   2.0-3.0   TTR:   61.7 % (3.6 y)   INR used for dosin.8 (2019)   Warfarin maintenance plan:   1 mg (2 mg x 0.5) every Mon, Fri; 2 mg (2 mg x 1) all other days   Full warfarin instructions:   1 mg every Mon, Fri; 2 mg all other days   Weekly warfarin total:   12 mg   Plan last modified:   Wilda Metcalf RN (2019)   Next INR check:   2019   Priority:   INR   Target end date:       Indications    Atrial fibrillation (HCC) [I48.91] [I48.91]  Long term current use of anticoagulant therapy [Z79.01]             Anticoagulation Episode Summary     INR check location:       Preferred lab:       Send INR reminders to:   MIGUEL ANGEL DALEY    Comments:   Take in AM. possible chromogenic 10/antiphosholipid syndrome            See the Encounter Report to view Anticoagulation Flowsheet and Dosing Calendar (Go to Encounters tab in chart review, and find the Anticoagulation Therapy Visit)    The patient was assessed for diet, medication, and activity level changes, missed or extra doses, bruising or bleeding, with no problem findings.      Wilda Metcalf RN

## 2019-08-08 ENCOUNTER — ANTICOAGULATION THERAPY VISIT (OUTPATIENT)
Dept: PEDIATRICS | Facility: CLINIC | Age: 79
End: 2019-08-08
Payer: COMMERCIAL

## 2019-08-08 DIAGNOSIS — Z79.01 LONG TERM CURRENT USE OF ANTICOAGULANT THERAPY: ICD-10-CM

## 2019-08-08 DIAGNOSIS — I48.20 CHRONIC ATRIAL FIBRILLATION (H): ICD-10-CM

## 2019-08-08 LAB — INR PPP: 2.7

## 2019-08-08 PROCEDURE — 99207 ZZC NO CHARGE NURSE ONLY: CPT | Performed by: NURSE PRACTITIONER

## 2019-08-08 NOTE — PROGRESS NOTES
ANTICOAGULATION FOLLOW-UP CLINIC VISIT    Patient Name:  Carlton Bravo  Date:  2019  Contact Type:  Ramila CAN HC Nurse    SUBJECTIVE:  Patient Findings         Clinical Outcomes     Negatives:   Major bleeding event, Thromboembolic event, Anticoagulation-related hospital admission, Anticoagulation-related ED visit, Anticoagulation-related fatality           OBJECTIVE    INR   Date Value Ref Range Status   2019 2.7  Final       ASSESSMENT / PLAN  No question data found.  Anticoagulation Summary  As of 2019    INR goal:   2.0-3.0   TTR:   61.9 % (3.6 y)   INR used for dosin.7 (2019)   Warfarin maintenance plan:   1 mg (2 mg x 0.5) every Mon, Fri; 2 mg (2 mg x 1) all other days   Full warfarin instructions:   1 mg every Mon, Fri; 2 mg all other days   Weekly warfarin total:   12 mg   No change documented:   Wilda Metcalf RN   Plan last modified:   Wilda Metcalf RN (2019)   Next INR check:   2019   Priority:   INR   Target end date:       Indications    Atrial fibrillation (HCC) [I48.91] [I48.91]  Long term current use of anticoagulant therapy [Z79.01]             Anticoagulation Episode Summary     INR check location:       Preferred lab:       Send INR reminders to:   MIGUEL ANGEL DALEY    Comments:   Take in AM. possible chromogenic 10/antiphosholipid syndrome            See the Encounter Report to view Anticoagulation Flowsheet and Dosing Calendar (Go to Encounters tab in chart review, and find the Anticoagulation Therapy Visit)    The patient was assessed for diet, medication, and activity level changes, missed or extra doses, bruising or bleeding, with no problem findings.      Wilda Metcalf RN

## 2019-08-14 DIAGNOSIS — E78.5 DYSLIPIDEMIA: ICD-10-CM

## 2019-08-14 RX ORDER — PRAVASTATIN SODIUM 80 MG/1
TABLET ORAL
Qty: 30 TABLET | Refills: 0 | Status: SHIPPED | OUTPATIENT
Start: 2019-08-14 | End: 2020-02-25

## 2019-08-14 NOTE — LETTER
August 14, 2019      Carlton Bravo  [address]  [place] [facility] [facility] [address]              Dear Carlton,    We recently received a refill request from your pharmacy requesting a refill of : Pravachol.    A review of your chart indicates that your last office visit was on 2/14.  A wellness visit (physical) with fasting labs is required every year with your provider. We have authorized a one time 30 day refill of your medication to allow time for you to schedule.     Please call the clinic at [phone], or log in to your [employer] account at [email or web address] to schedule your appointment within that time frame so there is no delay in next month's refill.    Thank you for taking an active role in your healthcare.    Sincerely,        Maurice Duggan MD    If you need assistance with your [employer] log in, please call [phone].

## 2019-08-19 ENCOUNTER — TELEPHONE (OUTPATIENT)
Dept: PEDIATRICS | Facility: CLINIC | Age: 79
End: 2019-08-19

## 2019-08-19 DIAGNOSIS — I10 HTN, GOAL BELOW 140/90: ICD-10-CM

## 2019-08-19 DIAGNOSIS — Z13.29 SCREENING FOR THYROID DISORDER: ICD-10-CM

## 2019-08-19 DIAGNOSIS — Z13.1 SCREENING FOR DIABETES MELLITUS (DM): ICD-10-CM

## 2019-08-19 DIAGNOSIS — E78.5 DYSLIPIDEMIA: ICD-10-CM

## 2019-08-19 DIAGNOSIS — I48.20 CHRONIC ATRIAL FIBRILLATION (H): Primary | ICD-10-CM

## 2019-08-19 DIAGNOSIS — R73.09 ELEVATED GLUCOSE: ICD-10-CM

## 2019-08-19 NOTE — TELEPHONE ENCOUNTER
[facility] Call Center    Phone Message    May a detailed message be left on voicemail: yes    Reason for Call: Other: Other: patient received a letter from dr dacosta re yearly exam and labs needing to be done in clinic. However, patient only sees lyubov jeffrey, not dr dacosta so will not be following up with olesya. patient is a home care patient so is not able to come into clinic anyways. Also, wanted to mention that he wanted a 90 day supply of the prevastatin so would like the rest of that request to please be filled with the  specialty pharmacy - he was only ordered 30 days supply. please call patient to confirm this is being done. writer routing message to rachele and olesya both.      Action Taken: Message routed to:  Primary Care p [phone]

## 2019-08-19 NOTE — TELEPHONE ENCOUNTER
Can not refill for year until his labs are up to date so we could fasting labs he is due for otherwise I can place order for him to come to our lab for fasting labs

## 2019-08-19 NOTE — TELEPHONE ENCOUNTER
[facility] Call Center    Phone Message    May a detailed message be left on voicemail: yes    Reason for Call: Other: patient received a letter from dr dacosta re yearly exam and labs needing to be done in clinic. However, patient only sees lyubov jeffrey, not dr dacosta so will not be following up with olesya. patient is a home care patient so is not able to come into clinic anyways. Also, wanted to mention that he wanted a 90 day supply of the prevastatin so would like the rest of that request to please be filled with the  specialty pharmacy - he was only ordered 30 days supply. please call patient to confirm this is being done. writer routing message to rachele and olesya both.     Action Taken: Message routed to:  Primary Care p [phone]

## 2019-08-19 NOTE — TELEPHONE ENCOUNTER
Routing to Dania Pettit NP to review and advise when able.  Will call patient with recommendations.  Last office visit with Dania Pettit NP was 2/14/19. Lipids were 5/2018.  Option, patient may be able to have fasting labs through home care RN?    Lab Results   Component Value Date    CHOL 166 05/09/2018     Lab Results   Component Value Date    HDL 94 05/09/2018     Lab Results   Component Value Date    LDL 56 05/09/2018     Lab Results   Component Value Date    TRIG 80 05/09/2018     Lab Results   Component Value Date    CHOLHDLRATIO 3.2 10/20/2015     Nisha Blanco RN, [facility]

## 2019-08-20 NOTE — TELEPHONE ENCOUNTER
See home care encounter 7/19/19.  Have reminded pt that he needs to go in for cardiology RV/follow up/ yearly exam, but pt currently refusing.     Ramila Valverde RN,   [facility]  [phone]    Called and left message for Ramila informing of need for fasting lab draw in near future at home visit. Faxed lab orders to [phone]. Provided clinic call back number for any questions.  Alexander CARTY, CMA

## 2019-08-20 NOTE — TELEPHONE ENCOUNTER
Called patient to inform of provider response. Patient agreeable to plan to complete fasting labs via home care nurse. He states he's never had it done this way and unsure how to coordinate this. Advised will route to Lona Pettit CNP for fasting lab orders.   Alexander CARTY CMA

## 2019-08-20 NOTE — TELEPHONE ENCOUNTER
Lab orders placed. Can we discuss with home health or can he get done at clinic  Also he is due for his year follow up appointment with cardiology

## 2019-08-22 ENCOUNTER — ANTICOAGULATION THERAPY VISIT (OUTPATIENT)
Dept: PEDIATRICS | Facility: CLINIC | Age: 79
End: 2019-08-22
Payer: COMMERCIAL

## 2019-08-22 DIAGNOSIS — Z79.01 LONG TERM CURRENT USE OF ANTICOAGULANT THERAPY: ICD-10-CM

## 2019-08-22 DIAGNOSIS — I48.20 CHRONIC ATRIAL FIBRILLATION (H): ICD-10-CM

## 2019-08-22 LAB
ALBUMIN SERPL-MCNC: 2.6 G/DL (ref 3.4–5)
ALP SERPL-CCNC: 111 U/L (ref 40–150)
ALT SERPL W P-5'-P-CCNC: 37 U/L (ref 0–70)
ANION GAP SERPL CALCULATED.3IONS-SCNC: 10 MMOL/L (ref 3–14)
AST SERPL W P-5'-P-CCNC: 79 U/L (ref 0–45)
BASOPHILS # BLD AUTO: 0.1 10E9/L (ref 0–0.2)
BASOPHILS NFR BLD AUTO: 2 %
BILIRUB SERPL-MCNC: 1 MG/DL (ref 0.2–1.3)
BUN SERPL-MCNC: 6 MG/DL (ref 7–30)
CALCIUM SERPL-MCNC: 7.9 MG/DL (ref 8.5–10.1)
CHLORIDE SERPL-SCNC: 110 MMOL/L (ref 94–109)
CHOLEST SERPL-MCNC: 133 MG/DL
CO2 SERPL-SCNC: 25 MMOL/L (ref 20–32)
CREAT SERPL-MCNC: 0.67 MG/DL (ref 0.66–1.25)
DIFFERENTIAL METHOD BLD: ABNORMAL
EOSINOPHIL # BLD AUTO: 0.1 10E9/L (ref 0–0.7)
EOSINOPHIL NFR BLD AUTO: 3 %
ERYTHROCYTE [DISTWIDTH] IN BLOOD BY AUTOMATED COUNT: 21.1 % (ref 10–15)
GFR SERPL CREATININE-BSD FRML MDRD: >90 ML/MIN/{1.73_M2}
GLUCOSE SERPL-MCNC: 74 MG/DL (ref 70–99)
HBA1C MFR BLD: 4.8 % (ref 0–5.6)
HCT VFR BLD AUTO: 37.4 % (ref 40–53)
HDLC SERPL-MCNC: 57 MG/DL
HGB BLD-MCNC: 11.7 G/DL (ref 13.3–17.7)
IMM GRANULOCYTES # BLD: 0 10E9/L (ref 0–0.4)
IMM GRANULOCYTES NFR BLD: 0.2 %
INR PPP: 3
LDLC SERPL CALC-MCNC: 61 MG/DL
LYMPHOCYTES # BLD AUTO: 1.1 10E9/L (ref 0.8–5.3)
LYMPHOCYTES NFR BLD AUTO: 24.3 %
MCH RBC QN AUTO: 30.2 PG (ref 26.5–33)
MCHC RBC AUTO-ENTMCNC: 31.3 G/DL (ref 31.5–36.5)
MCV RBC AUTO: 96 FL (ref 78–100)
MONOCYTES # BLD AUTO: 0.7 10E9/L (ref 0–1.3)
MONOCYTES NFR BLD AUTO: 14.8 %
NEUTROPHILS # BLD AUTO: 2.5 10E9/L (ref 1.6–8.3)
NEUTROPHILS NFR BLD AUTO: 55.7 %
NONHDLC SERPL-MCNC: 75 MG/DL
NRBC # BLD AUTO: 0 10*3/UL
NRBC BLD AUTO-RTO: 0 /100
PLATELET # BLD AUTO: 204 10E9/L (ref 150–450)
POTASSIUM SERPL-SCNC: 3.8 MMOL/L (ref 3.4–5.3)
PROT SERPL-MCNC: 5.8 G/DL (ref 6.8–8.8)
RBC # BLD AUTO: 3.88 10E12/L (ref 4.4–5.9)
SODIUM SERPL-SCNC: 146 MMOL/L (ref 133–144)
T4 FREE SERPL-MCNC: 1.21 NG/DL (ref 0.76–1.46)
TRIGL SERPL-MCNC: 70 MG/DL
TSH SERPL DL<=0.005 MIU/L-ACNC: 1.68 MU/L (ref 0.4–4)
WBC # BLD AUTO: 4.4 10E9/L (ref 4–11)

## 2019-08-22 PROCEDURE — 84443 ASSAY THYROID STIM HORMONE: CPT | Performed by: INTERNAL MEDICINE

## 2019-08-22 PROCEDURE — 80061 LIPID PANEL: CPT | Performed by: INTERNAL MEDICINE

## 2019-08-22 PROCEDURE — 85025 COMPLETE CBC W/AUTO DIFF WBC: CPT | Performed by: INTERNAL MEDICINE

## 2019-08-22 PROCEDURE — 80053 COMPREHEN METABOLIC PANEL: CPT | Performed by: INTERNAL MEDICINE

## 2019-08-22 PROCEDURE — 83036 HEMOGLOBIN GLYCOSYLATED A1C: CPT | Performed by: INTERNAL MEDICINE

## 2019-08-22 PROCEDURE — 84439 ASSAY OF FREE THYROXINE: CPT | Performed by: INTERNAL MEDICINE

## 2019-08-22 PROCEDURE — 99207 ZZC NO CHARGE NURSE ONLY: CPT | Performed by: NURSE PRACTITIONER

## 2019-08-22 NOTE — PROGRESS NOTES
ANTICOAGULATION FOLLOW-UP CLINIC VISIT    Patient Name:  Carlton Bravo  Date:  8/22/2019  Contact Type:  Telephone    SUBJECTIVE:  Patient Findings         Clinical Outcomes     Negatives:   Major bleeding event, Thromboembolic event, Anticoagulation-related hospital admission, Anticoagulation-related ED visit, Anticoagulation-related fatality           OBJECTIVE    INR   Date Value Ref Range Status   08/22/2019 3.0  Final       ASSESSMENT / PLAN  No question data found.  Anticoagulation Summary  As of 8/22/2019    INR goal:   2.0-3.0   TTR:   62.3 % (3.6 y)   INR used for dosing:   3.0 (8/22/2019)   Warfarin maintenance plan:   1 mg (2 mg x 0.5) every Mon, Fri; 2 mg (2 mg x 1) all other days   Full warfarin instructions:   1 mg every Mon, Fri; 2 mg all other days   Weekly warfarin total:   12 mg   Plan last modified:   Wilda Metcalf RN (7/25/2019)   Next INR check:   9/12/2019   Priority:   INR   Target end date:       Indications    Atrial fibrillation (HCC) [I48.91] [I48.91]  Long term current use of anticoagulant therapy [Z79.01]             Anticoagulation Episode Summary     INR check location:       Preferred lab:       Send INR reminders to:   MIGUEL ANGEL DALEY    Comments:   Take in AM. possible chromogenic 10/antiphosholipid syndrome            See the Encounter Report to view Anticoagulation Flowsheet and Dosing Calendar (Go to Encounters tab in chart review, and find the Anticoagulation Therapy Visit)    The patient was assessed for diet, medication, and activity level changes, missed or extra doses, bruising or bleeding, with no problem findings.      Wilda Metcalf RN

## 2019-08-23 NOTE — RESULT ENCOUNTER NOTE
Please call patient: his hemoglobin is reduced some from last year, borderline elevated sodium, liver function showed elevation in the AST, similar to last year and low protein level. Diabetes marker, thyroid test and lipid panel were normal. Please follow up with Dania next week to evaluate the abnormalities seen.

## 2019-09-12 ENCOUNTER — ANTICOAGULATION THERAPY VISIT (OUTPATIENT)
Dept: PEDIATRICS | Facility: CLINIC | Age: 79
End: 2019-09-12
Payer: COMMERCIAL

## 2019-09-12 ENCOUNTER — ANTICOAGULATION THERAPY VISIT (OUTPATIENT)
Dept: PEDIATRICS | Facility: CLINIC | Age: 79
End: 2019-09-12

## 2019-09-12 DIAGNOSIS — Z79.01 LONG TERM CURRENT USE OF ANTICOAGULANT THERAPY: ICD-10-CM

## 2019-09-12 DIAGNOSIS — I48.20 CHRONIC ATRIAL FIBRILLATION (H): ICD-10-CM

## 2019-09-12 LAB — INR PPP: 3.2

## 2019-09-12 PROCEDURE — 99207 ZZC NO CHARGE NURSE ONLY: CPT | Performed by: NURSE PRACTITIONER

## 2019-09-12 NOTE — PROGRESS NOTES
ANTICOAGULATION FOLLOW-UP CLINIC VISIT    Patient Name:  Carlton Bravo  Date:  9/12/2019  Contact Type:  Telephone/ PAMELLA Mcgrath HC nurse, and Carlton    SUBJECTIVE:  Patient Findings     Positives:   Change in diet/appetite    Comments:   Eating less the past few weeks        Clinical Outcomes     Negatives:   Major bleeding event, Thromboembolic event, Anticoagulation-related hospital admission, Anticoagulation-related ED visit, Anticoagulation-related fatality    Comments:   Eating less the past few weeks           OBJECTIVE    INR   Date Value Ref Range Status   09/12/2019 3.2  Final       ASSESSMENT / PLAN  No question data found.  Anticoagulation Summary  As of 9/12/2019    INR goal:   2.0-3.0   TTR:   61.3 % (3.7 y)   INR used for dosing:   3.2! (9/12/2019)   Warfarin maintenance plan:   1 mg (2 mg x 0.5) every Mon, Fri; 2 mg (2 mg x 1) all other days   Full warfarin instructions:   1 mg every Mon, Fri; 2 mg all other days   Weekly warfarin total:   12 mg   No change documented:   Wilda Metcalf RN   Plan last modified:   Wilda Metcalf RN (7/25/2019)   Next INR check:   9/19/2019   Priority:   INR   Target end date:       Indications    Atrial fibrillation (HCC) [I48.91] [I48.91]  Long term current use of anticoagulant therapy [Z79.01]             Anticoagulation Episode Summary     INR check location:       Preferred lab:       Send INR reminders to:   MIGUEL ANGEL DALEY    Comments:   Take in AM. possible chromogenic 10/antiphosholipid syndrome            See the Encounter Report to view Anticoagulation Flowsheet and Dosing Calendar (Go to Encounters tab in chart review, and find the Anticoagulation Therapy Visit)    Continue maintenance dose per policy and recheck in 2 weeks. If still supra reduce dose.    Wilda Metcalf RN

## 2019-09-19 ENCOUNTER — DOCUMENTATION ONLY (OUTPATIENT)
Dept: CARE COORDINATION | Facility: CLINIC | Age: 79
End: 2019-09-19

## 2019-09-19 ENCOUNTER — ANTICOAGULATION THERAPY VISIT (OUTPATIENT)
Dept: PEDIATRICS | Facility: CLINIC | Age: 79
End: 2019-09-19
Payer: COMMERCIAL

## 2019-09-19 DIAGNOSIS — Z79.01 LONG TERM CURRENT USE OF ANTICOAGULANT THERAPY: ICD-10-CM

## 2019-09-19 DIAGNOSIS — I48.20 CHRONIC ATRIAL FIBRILLATION (H): ICD-10-CM

## 2019-09-19 LAB — INR POINT OF CARE: 1.9 (ref 0.86–1.14)

## 2019-09-19 PROCEDURE — 85610 PROTHROMBIN TIME: CPT | Mod: QW | Performed by: NURSE PRACTITIONER

## 2019-09-19 PROCEDURE — 99207 ZZC NO CHARGE NURSE ONLY: CPT | Performed by: NURSE PRACTITIONER

## 2019-09-19 PROCEDURE — 36416 COLLJ CAPILLARY BLOOD SPEC: CPT | Performed by: NURSE PRACTITIONER
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
## 2019-09-19 NOTE — PROGRESS NOTES
ANTICOAGULATION FOLLOW-UP CLINIC VISIT    Patient Name:  Carlton Bravo  Date:  2019  Contact Type:  Telephone/ Ramila CAN HC nurse    SUBJECTIVE:  Patient Findings     Positives:   Change in diet/appetite    Comments:   Drank less alcohol  Ate arugula         Clinical Outcomes     Negatives:   Major bleeding event, Thromboembolic event, Anticoagulation-related hospital admission, Anticoagulation-related ED visit, Anticoagulation-related fatality    Comments:   Drank less alcohol  Ate arugula            OBJECTIVE    INR Protime   Date Value Ref Range Status   2019 1.9 (A) 0.86 - 1.14 Corrected       ASSESSMENT / PLAN  INR assessment SUB    Recheck INR In: 1 WEEK    INR Location [facility]      Anticoagulation Summary  As of 2019    INR goal:   2.0-3.0   TTR:   61.4 % (3.7 y)   INR used for dosin.9! (2019)   Warfarin maintenance plan:   1 mg (2 mg x 0.5) every Mon, Fri; 2 mg (2 mg x 1) all other days   Full warfarin instructions:   1 mg every Mon, Fri; 2 mg all other days   Weekly warfarin total:   12 mg   Plan last modified:   Wilda Metcalf RN (2019)   Next INR check:   2019   Priority:   INR   Target end date:       Indications    Atrial fibrillation (HCC) [I48.91] [I48.91]  Long term current use of anticoagulant therapy [Z79.01]             Anticoagulation Episode Summary     INR check location:       Preferred lab:       Send INR reminders to:   MIGUEL ANGEL DALEY    Comments:   Take in AM. possible chromogenic 10/antiphosholipid syndrome      Anticoagulation Care Providers     Provider Role Specialty Phone number    Lona Pettit, NI [facility] [phone]            See the Encounter Report to view Anticoagulation Flowsheet and Dosing Calendar (Go to Encounters tab in chart review, and find the Anticoagulation Therapy Visit)        Wilda Metcalf RN

## 2019-09-23 DIAGNOSIS — E78.5 DYSLIPIDEMIA: ICD-10-CM

## 2019-09-23 RX ORDER — PRAVASTATIN SODIUM 80 MG/1
TABLET ORAL
Qty: 90 TABLET | Refills: 1 | Status: SHIPPED | OUTPATIENT
Start: 2019-09-23 | End: 2020-02-25

## 2019-09-26 ENCOUNTER — ANTICOAGULATION THERAPY VISIT (OUTPATIENT)
Dept: PEDIATRICS | Facility: CLINIC | Age: 79
End: 2019-09-26
Payer: COMMERCIAL

## 2019-09-26 ENCOUNTER — TELEPHONE (OUTPATIENT)
Dept: PEDIATRICS | Facility: CLINIC | Age: 79
End: 2019-09-26

## 2019-09-26 DIAGNOSIS — I48.20 CHRONIC ATRIAL FIBRILLATION (H): ICD-10-CM

## 2019-09-26 DIAGNOSIS — Z79.01 LONG TERM CURRENT USE OF ANTICOAGULANT THERAPY: ICD-10-CM

## 2019-09-26 LAB — INR PPP: 2.8

## 2019-09-26 PROCEDURE — 99207 ZZC NO CHARGE NURSE ONLY: CPT | Performed by: NURSE PRACTITIONER

## 2019-09-26 NOTE — TELEPHONE ENCOUNTER
[facility] Call Center    Phone Message    May a detailed message be left on voicemail: no    Reason for Call: Order(s): Other:   Reason for requested:Home care nurse needs Approval for flu injection please call [phone] ok to leave v/msg  Date needed: asap  Provider name: lyubov jeffrey      Action Taken: Message routed to:  Primary Care p [phone]

## 2019-09-26 NOTE — PROGRESS NOTES
ANTICOAGULATION FOLLOW-UP CLINIC VISIT    Patient Name:  Carlton Bravo  Date:  2019  Contact Type:  Telephone/ Ramila CAN HC, left voicemail    SUBJECTIVE:  Patient Findings         Clinical Outcomes     Negatives:   Major bleeding event, Thromboembolic event, Anticoagulation-related hospital admission, Anticoagulation-related ED visit, Anticoagulation-related fatality           OBJECTIVE    INR   Date Value Ref Range Status   2019 2.8  Final       ASSESSMENT / PLAN  INR assessment THER    Recheck INR In: 2 WEEKS    INR Location [facility]R      Anticoagulation Summary  As of 2019    INR goal:   2.0-3.0   TTR:   61.5 % (3.7 y)   INR used for dosin.8 (2019)   Warfarin maintenance plan:   1 mg (2 mg x 0.5) every Mon, Fri; 2 mg (2 mg x 1) all other days   Full warfarin instructions:   1 mg every Mon, Fri; 2 mg all other days   Weekly warfarin total:   12 mg   No change documented:   Wilda Metcalf RN   Plan last modified:   Wilda Metcalf RN (2019)   Next INR check:   10/10/2019   Priority:   INR   Target end date:       Indications    Atrial fibrillation (HCC) [I48.91] [I48.91]  Long term current use of anticoagulant therapy [Z79.01]             Anticoagulation Episode Summary     INR check location:       Preferred lab:       Send INR reminders to:   MIGUEL ANGEL DALEY    Comments:   Take in AM. possible chromogenic 10/antiphosholipid syndrome      Anticoagulation Care Providers     Provider Role Specialty Phone number    WilverLona APRN CNP Responsible Family Practice [phone]            See the Encounter Report to view Anticoagulation Flowsheet and Dosing Calendar (Go to Encounters tab in chart review, and find the Anticoagulation Therapy Visit)        Wilda Metcalf RN

## 2019-09-30 ENCOUNTER — PATIENT OUTREACH (OUTPATIENT)
Dept: GERIATRIC MEDICINE | Facility: CLINIC | Age: 79
End: 2019-09-30
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
## 2019-10-04 DIAGNOSIS — Z53.9 DIAGNOSIS NOT YET DEFINED: Primary | ICD-10-CM

## 2019-10-04 PROCEDURE — G0179 MD RECERTIFICATION HHA PT: HCPCS | Performed by: INTERNAL MEDICINE

## 2019-10-10 ENCOUNTER — ANTICOAGULATION THERAPY VISIT (OUTPATIENT)
Dept: PHARMACY | Facility: CLINIC | Age: 79
End: 2019-10-10
Payer: COMMERCIAL

## 2019-10-10 DIAGNOSIS — Z79.01 LONG TERM CURRENT USE OF ANTICOAGULANT THERAPY: ICD-10-CM

## 2019-10-10 DIAGNOSIS — I48.91 ATRIAL FIBRILLATION (H): ICD-10-CM

## 2019-10-10 LAB — INR PPP: 3.2

## 2019-10-10 NOTE — PROGRESS NOTES
ANTICOAGULATION FOLLOW-UP CLINIC VISIT    Patient Name:  Carlton Bravo  Date:  10/10/2019  Contact Type:  Telephone/ Radha Mcgrath [facility] [phone]    SUBJECTIVE:  Patient Findings         Clinical Outcomes     Negatives:   Major bleeding event, Thromboembolic event, Anticoagulation-related hospital admission, Anticoagulation-related ED visit, Anticoagulation-related fatality           OBJECTIVE    INR   Date Value Ref Range Status   10/10/2019 3.2  Final       ASSESSMENT / PLAN  INR assessment SUPRA    Recheck INR In: 8 DAYS    [facility]R Location [facility]      Anticoagulation Summary  As of 10/10/2019    INR goal:   2.0-3.0   TTR:   61.4 % (3.8 y)   INR used for dosing:   3.2! (10/10/2019)   Warfarin maintenance plan:   1 mg (2 mg x 0.5) every Mon, Fri; 2 mg (2 mg x 1) all other days   Full warfarin instructions:   10/10: 1 mg; Otherwise 1 mg every Mon, Fri; 2 mg all other days   Weekly warfarin total:   12 mg   Plan last modified:   Wilda Metcalf RN (7/25/2019)   Next INR check:   10/18/2019   Priority:   INR   Target end date:       Indications    Atrial fibrillation (HCC) [I48.91] [I48.91]  Long term current use of anticoagulant therapy [Z79.01]             Anticoagulation Episode Summary     INR check location:       Preferred lab:       Send INR reminders to:   MIGUEL ANGEL DALEY    Comments:   Take in AM. possible chromogenic 10/antiphosholipid syndrome      Anticoagulation Care Providers     Provider Role Specialty Phone number    Lona Pettit, APRN [facility] [phone]            See the Encounter Report to view Anticoagulation Flowsheet and Dosing Calendar (Go to Encounters tab in chart review, and find the Anticoagulation Therapy Visit)        Nisha Blanco RN

## 2019-10-18 ENCOUNTER — ANTICOAGULATION THERAPY VISIT (OUTPATIENT)
Dept: PEDIATRICS | Facility: CLINIC | Age: 79
End: 2019-10-18
Payer: COMMERCIAL

## 2019-10-18 DIAGNOSIS — Z79.01 LONG TERM CURRENT USE OF ANTICOAGULANT THERAPY: ICD-10-CM

## 2019-10-18 DIAGNOSIS — I48.91 ATRIAL FIBRILLATION (H): ICD-10-CM

## 2019-10-18 LAB — INR PPP: 3.4

## 2019-10-18 PROCEDURE — 99207 ZZC NO CHARGE NURSE ONLY: CPT | Performed by: NURSE PRACTITIONER

## 2019-10-18 NOTE — PROGRESS NOTES
ANTICOAGULATION FOLLOW-UP CLINIC VISIT    Patient Name:  Carlton Bravo  Date:  10/18/2019  Contact Type:  Face to Face    SUBJECTIVE:  Patient Findings         Clinical Outcomes     Negatives:   Major bleeding event, Thromboembolic event, Anticoagulation-related hospital admission, Anticoagulation-related ED visit, Anticoagulation-related fatality           OBJECTIVE    INR   Date Value Ref Range Status   10/18/2019 3.4  Final       ASSESSMENT / PLAN  INR assessment SUPRA    Recheck INR In: 10 DAYS    [facility] Location [facility]      Anticoagulation Summary  As of 10/18/2019    INR goal:   2.0-3.0   TTR:   61.1 % (3.8 y)   INR used for dosing:   3.4! (10/18/2019)   Warfarin maintenance plan:   1 mg (2 mg x 0.5) every Mon, Wed, Fri; 2 mg (2 mg x 1) all other days   Full warfarin instructions:   1 mg every Mon, Wed, Fri; 2 mg all other days   Weekly warfarin total:   11 mg   Plan last modified:   Wilda Metcalf RN (10/18/2019)   Next INR check:   10/29/2019   Priority:   INR   Target end date:       Indications    Atrial fibrillation (HCC) [I48.91] [I48.91]  Long term current use of anticoagulant therapy [Z79.01]             Anticoagulation Episode Summary     INR check location:       Preferred lab:       Send INR reminders to:   MIGUEL ANGEL DALEY    Comments:   Take in AM. possible chromogenic 10/antiphosholipid syndrome      Anticoagulation Care Providers     Provider Role Specialty Phone number    Lona Pettit, NI [facility] [phone]            See the Encounter Report to view Anticoagulation Flowsheet and Dosing Calendar (Go to Encounters tab in chart review, and find the Anticoagulation Therapy Visit)    INR trending up. Lowered maintenance dose by 1 mg (8.3%). Recheck in 10 days.    Wilda Metcalf RN

## 2019-10-29 ENCOUNTER — TELEPHONE (OUTPATIENT)
Dept: PEDIATRICS | Facility: CLINIC | Age: 79
End: 2019-10-29
Payer: COMMERCIAL

## 2019-10-29 DIAGNOSIS — I48.91 ATRIAL FIBRILLATION (H): ICD-10-CM

## 2019-10-29 DIAGNOSIS — Z79.01 LONG TERM CURRENT USE OF ANTICOAGULANT THERAPY: ICD-10-CM

## 2019-10-29 LAB — INR PPP: 3.9

## 2019-10-29 PROCEDURE — 99207 ZZC NO CHARGE NURSE ONLY: CPT | Performed by: NURSE PRACTITIONER

## 2019-11-04 ENCOUNTER — TELEPHONE (OUTPATIENT)
Dept: PEDIATRICS | Facility: CLINIC | Age: 79
End: 2019-11-04
Payer: COMMERCIAL

## 2019-11-04 DIAGNOSIS — Z79.01 LONG TERM CURRENT USE OF ANTICOAGULANT THERAPY: ICD-10-CM

## 2019-11-04 DIAGNOSIS — I48.91 ATRIAL FIBRILLATION (H): ICD-10-CM

## 2019-11-04 PROCEDURE — 99207 ZZC NO CHARGE NURSE ONLY: CPT | Performed by: NURSE PRACTITIONER

## 2019-11-04 NOTE — TELEPHONE ENCOUNTER
ANTICOAGULATION FOLLOW-UP CLINIC VISIT    Patient Name:  Carlton Bravo  Date:  11/4/2019  Contact Type:  Telephone/ Ramila, KELLY nurse/Carlton    SUBJECTIVE:  Patient Findings         Clinical Outcomes     Negatives:   Major bleeding event, Thromboembolic event, Anticoagulation-related hospital admission, Anticoagulation-related ED visit, Anticoagulation-related fatality           OBJECTIVE    INR   Date Value Ref Range Status   10/29/2019 3.9  Final       ASSESSMENT / PLAN      Anticoagulation Summary  As of 11/4/2019    INR goal:   2.0-3.0   TTR:   60.6 % (3.8 y)   INR used for dosing:      Warfarin maintenance plan:   2 mg (2 mg x 1) every Mon, Wed, Fri; 1 mg (2 mg x 0.5) all other days   Full warfarin instructions:   2 mg every Mon, Wed, Fri; 1 mg all other days   Weekly warfarin total:   10 mg   Plan last modified:   Wilda Metcalf RN (11/4/2019)   Next INR check:   11/14/2019   Priority:   INR   Target end date:       Indications    Atrial fibrillation (HCC) [I48.91] [I48.91]  Long term current use of anticoagulant therapy [Z79.01]             Anticoagulation Episode Summary     INR check location:       Preferred lab:       Send INR reminders to:   MIGUEL ANGEL DALEY    Comments:   Take in AM. possible chromogenic 10/antiphosholipid syndrome      Anticoagulation Care Providers     Provider Role Specialty Phone number    Lona Pettit, NI [facility] [phone]            See the Encounter Report to view Anticoagulation Flowsheet and Dosing Calendar (Go to Encounters tab in chart review, and find the Anticoagulation Therapy Visit)    Because there was no indication for supra INR last week and it has been running slightly above goal will lower maintenance by 1 mg (9.1%), 10 mg weekly. Took 9 mg last weekly. Previous maintenance was 11 mg.     Wilda Metcalf RN

## 2019-11-06 DIAGNOSIS — Z79.01 LONG TERM CURRENT USE OF ANTICOAGULANT THERAPY: ICD-10-CM

## 2019-11-06 DIAGNOSIS — I48.91 ATRIAL FIBRILLATION (H): Primary | ICD-10-CM

## 2019-11-06 RX ORDER — WARFARIN SODIUM 2 MG/1
TABLET ORAL
Qty: 90 TABLET | Refills: 1 | Status: SHIPPED | OUTPATIENT
Start: 2019-11-06 | End: 2020-02-25

## 2019-11-06 NOTE — TELEPHONE ENCOUNTER
Warfarin 2 mg  Current Dosing Instructions: Take 1 tablet (2 mg) Mon, Wed, Fri, and 1/2 tablet (1 mg) all other days or as directed by INR clinic.  Tabs: 90  Refills: 1  Next INR due: 11/14

## 2019-11-14 ENCOUNTER — TELEPHONE (OUTPATIENT)
Dept: PEDIATRICS | Facility: CLINIC | Age: 79
End: 2019-11-14
Payer: COMMERCIAL

## 2019-11-14 DIAGNOSIS — Z79.01 LONG TERM CURRENT USE OF ANTICOAGULANT THERAPY: ICD-10-CM

## 2019-11-14 DIAGNOSIS — I48.91 ATRIAL FIBRILLATION (H): ICD-10-CM

## 2019-11-14 LAB — INR PPP: 1.9

## 2019-11-14 PROCEDURE — 99207 ZZC NO CHARGE NURSE ONLY: CPT | Performed by: NURSE PRACTITIONER

## 2019-11-14 NOTE — TELEPHONE ENCOUNTER
ANTICOAGULATION FOLLOW-UP CLINIC VISIT    Patient Name:  Carlton Bravo  Date:  2019  Contact Type:  Telephone    SUBJECTIVE:  Patient Findings         Clinical Outcomes     Negatives:   Major bleeding event, Thromboembolic event, Anticoagulation-related hospital admission, Anticoagulation-related ED visit, Anticoagulation-related fatality           OBJECTIVE    INR   Date Value Ref Range Status   2019 1.9  Final       ASSESSMENT / PLAN      Anticoagulation Summary  As of 2019    INR goal:   2.0-3.0   TTR:   60.5 % (3.9 y)   INR used for dosin.9! (2019)   Warfarin maintenance plan:   1 mg (2 mg x 0.5) every Mon, Wed, Fri; 2 mg (2 mg x 1) all other days   Full warfarin instructions:   1 mg every Mon, Wed, Fri; 2 mg all other days   Weekly warfarin total:   11 mg   Plan last modified:   Wilda Metcalf RN (2019)   Next INR check:   2019   Priority:   INR   Target end date:       Indications    Atrial fibrillation (HCC) [I48.91] [I48.91]  Long term current use of anticoagulant therapy [Z79.01]             Anticoagulation Episode Summary     INR check location:       Preferred lab:       Send INR reminders to:   MIGUEL ANGEL DALEY    Comments:   Take in AM. possible chromogenic 10/antiphosholipid syndrome      Anticoagulation Care Providers     Provider Role Specialty Phone number    Kimberly PettitNI [facility] [phone]            See the Encounter Report to view Anticoagulation Flowsheet and Dosing Calendar (Go to Encounters tab in chart review, and find the Anticoagulation Therapy Visit)    INR trending down on new lower dose. Will add back 1 mg. If INR 3.0-3.3 encourage patient to consume a little more greens and continue same dose then recheck in 1 week.  If therapeutic continue dose and recheck in 2 weeks.    Wilda Metcalf RN

## 2019-11-15 ENCOUNTER — DOCUMENTATION ONLY (OUTPATIENT)
Dept: CARE COORDINATION | Facility: CLINIC | Age: 79
End: 2019-11-15

## 2019-11-15 NOTE — PROGRESS NOTES
[facility] utilizes an encounter to take the place of a direct phone call to your office. Please take a moment to review the below request. Please reply or route message to author of this encounter.  Message will act as a verbal OK of orders requested below. Thank you.    ORDER  SUMMARY TO MD  S/  79 yr old Male being recertified for home care services for ongoing coordination of care and INR/labs. Over past 60 days, Pt has remains stable and has had no falls.  Pt reports it being very taxing to leave home and difficult to get to clinic for INR checks. 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 Denies any issues with anxiety or depression. IADL assistance is provided by delivery systems such as [facility] and will have [facility] deliver medications. Pt has close friend that assists with stephanieand álvaro/ MD appointments. Medication reconciliation was completed patient is knowledgeable of medication dosing, frequency, route and purpose of all meds. Pt is stable at this time. INRs have been a bit unstable, and we do not know why he has had these changes.  He reports eating the same amt of greens/food and drinking the same amt of alcohol each day. Have also reminded pt that he needs to go in for cardiology RV/follow up/ yearly exam, but pt currently refusing.   B/  Lives alone in [facility] [facility] with a fair support system in place.  history of GI bleed, falls, traumatic wounds, idiopathic cardiomyopathy.  Pt refusing to see cardiologist at this time.  States had a cardiac ECHO at [facility] a few months ago.  A/  Pt is at risk for hospitalization r/t Chronic disease mgmt, hx of falls/fall risk, ETOH consumption on a daily basis and  dependent on others for IADL needs.  R/  SN visits 1x mo x1xmo 3ngde6jm, 3aioo4gd, 6 prn visits,  Coordination of care, INRs, and vulnerability assessment d/t ETOH consumption.     Ramila  KACI Valverde,   [facility]  [phone]  [email or web address]

## 2019-11-16 NOTE — PROGRESS NOTES
Agree with plan   Is overdue for follow up with cardiology   Needs to make follow up appointment with cardiology   Should be able to do this at the [facility]   Echo was last December 2018 at [facility]

## 2019-11-25 ENCOUNTER — TELEPHONE (OUTPATIENT)
Dept: PEDIATRICS | Facility: CLINIC | Age: 79
End: 2019-11-25
Payer: COMMERCIAL

## 2019-11-25 DIAGNOSIS — I48.91 ATRIAL FIBRILLATION (H): ICD-10-CM

## 2019-11-25 DIAGNOSIS — Z79.01 LONG TERM CURRENT USE OF ANTICOAGULANT THERAPY: ICD-10-CM

## 2019-11-25 DIAGNOSIS — Z53.9 DIAGNOSIS NOT YET DEFINED: Primary | ICD-10-CM

## 2019-11-25 PROCEDURE — G0179 MD RECERTIFICATION HHA PT: HCPCS | Performed by: INTERNAL MEDICINE

## 2019-11-25 PROCEDURE — 99207 ZZC NO CHARGE NURSE ONLY: CPT | Performed by: NURSE PRACTITIONER

## 2019-11-25 NOTE — TELEPHONE ENCOUNTER
ANTICOAGULATION FOLLOW-UP CLINIC VISIT    Patient Name:  Carlton Bravo  Date:  2019  Contact Type:  Telephone    SUBJECTIVE:  Patient Findings     Comments:   INR trending down despite increased dose. Will increase further by 1 mg (9.1%). Recheck in 1 week.        Clinical Outcomes     Negatives:   Major bleeding event, Thromboembolic event, Anticoagulation-related hospital admission, Anticoagulation-related ED visit, Anticoagulation-related fatality    Comments:   INR trending down despite increased dose. Will increase further by 1 mg (9.1%). Recheck in 1 week.           OBJECTIVE    INR Protime   Date Value Ref Range Status   2019 1.8 (A) 0.86 - 1.14 Final       ASSESSMENT / PLAN      Anticoagulation Summary  As of 2019    INR goal:   2.0-3.0   TTR:   58.3 % (1 y)   INR used for dosin.8! (2019)   Warfarin maintenance plan:   1 mg (2 mg x 0.5) every Sun, Thu; 2 mg (2 mg x 1) all other days   Full warfarin instructions:   1 mg every Sun, Thu; 2 mg all other days   Weekly warfarin total:   12 mg   Plan last modified:   Wilda Metcalf RN (2019)   Next INR check:   2019   Priority:   INR   Target end date:       Indications    Atrial fibrillation (HCC) [I48.91] [I48.91]  Long term current use of anticoagulant therapy [Z79.01]             Anticoagulation Episode Summary     INR check location:       Preferred lab:       Send INR reminders to:   MIGUEL ANGEL DALEY    Comments:   Take in AM. possible chromogenic 10/antiphosholipid syndrome      Anticoagulation Care Providers     Provider Role Specialty Phone number    Lona Pettit APRN [facility] [phone]            See the Encounter Report to view Anticoagulation Flowsheet and Dosing Calendar (Go to Encounters tab in chart review, and find the Anticoagulation Therapy Visit)        Wilda Metcalf RN

## 2019-11-25 NOTE — ADDENDUM NOTE
Addended by: HENRI DONAHUE on: 11/25/2019 10:50 AM     Modules accepted: Level of Service, SmartSet

## 2019-11-25 NOTE — TELEPHONE ENCOUNTER
[facility] Call Center    Phone Message    May a detailed message be left on voicemail: yes    Reason for Call: Audrey from  Home Care called to report Carlton's  INR results. His INR is 1.8. Thank you.    Action Taken: Message routed to:  INR clinic/nurse p [phone]

## 2019-12-02 ENCOUNTER — ANTICOAGULATION THERAPY VISIT (OUTPATIENT)
Dept: PEDIATRICS | Facility: CLINIC | Age: 79
End: 2019-12-02
Payer: COMMERCIAL

## 2019-12-02 DIAGNOSIS — Z79.01 LONG TERM CURRENT USE OF ANTICOAGULANT THERAPY: ICD-10-CM

## 2019-12-02 DIAGNOSIS — I48.91 ATRIAL FIBRILLATION (H): ICD-10-CM

## 2019-12-02 LAB — INR PPP: 2.2

## 2019-12-02 PROCEDURE — 99207 ZZC NO CHARGE NURSE ONLY: CPT | Performed by: NURSE PRACTITIONER

## 2019-12-02 NOTE — PROGRESS NOTES
ANTICOAGULATION FOLLOW-UP CLINIC VISIT    Patient Name:  Carlton Bravo  Date:  2019  Contact Type:  Face to Face    SUBJECTIVE:  Patient Findings         Clinical Outcomes     Negatives:   Major bleeding event, Thromboembolic event, Anticoagulation-related hospital admission, Anticoagulation-related ED visit, Anticoagulation-related fatality           OBJECTIVE    INR   Date Value Ref Range Status   2019 2.2  Final       ASSESSMENT / PLAN  INR assessment THER    Recheck INR In: 2 WEEKS    [facility]R Location [facility]      Anticoagulation Summary  As of 2019    INR goal:   2.0-3.0   TTR:   61.1 % (1 y)   INR used for dosin.2 (2019)   Warfarin maintenance plan:   1 mg (2 mg x 0.5) every Sun, Thu; 2 mg (2 mg x 1) all other days   Full warfarin instructions:   1 mg every Sun, Thu; 2 mg all other days   Weekly warfarin total:   12 mg   No change documented:   Wilda Metcalf RN   Plan last modified:   Wilda Metcalf RN (2019)   Next INR check:      Priority:   INR   Target end date:       Indications    Atrial fibrillation (HCC) [I48.91] [I48.91]  Long term current use of anticoagulant therapy [Z79.01]             Anticoagulation Episode Summary     INR check location:       Preferred lab:       Send INR reminders to:   MIGUEL ANGEL DALEY    Comments:   Take in AM. possible chromogenic 10/antiphosholipid syndrome      Anticoagulation Care Providers     Provider Role Specialty Phone number    Lona PettitNI [facility] [phone]            See the Encounter Report to view Anticoagulation Flowsheet and Dosing Calendar (Go to Encounters tab in chart review, and find the Anticoagulation Therapy Visit)        Wilda Metcalf RN

## 2019-12-16 ENCOUNTER — ANTICOAGULATION THERAPY VISIT (OUTPATIENT)
Dept: PEDIATRICS | Facility: CLINIC | Age: 79
End: 2019-12-16
Payer: COMMERCIAL

## 2019-12-16 DIAGNOSIS — I48.91 ATRIAL FIBRILLATION (H): ICD-10-CM

## 2019-12-16 DIAGNOSIS — I42.9 IDIOPATHIC CARDIOMYOPATHY (H): ICD-10-CM

## 2019-12-16 DIAGNOSIS — I10 HTN, GOAL BELOW 140/90: ICD-10-CM

## 2019-12-16 DIAGNOSIS — Z79.01 LONG TERM CURRENT USE OF ANTICOAGULANT THERAPY: ICD-10-CM

## 2019-12-16 LAB — INR POINT OF CARE: 1.7 (ref 0.86–1.14)

## 2019-12-16 PROCEDURE — 85610 PROTHROMBIN TIME: CPT | Mod: QW | Performed by: NURSE PRACTITIONER

## 2019-12-16 PROCEDURE — 99207 ZZC NO CHARGE NURSE ONLY: CPT | Performed by: NURSE PRACTITIONER

## 2019-12-16 PROCEDURE — 36416 COLLJ CAPILLARY BLOOD SPEC: CPT | Performed by: NURSE PRACTITIONER

## 2019-12-16 NOTE — PROGRESS NOTES
ANTICOAGULATION FOLLOW-UP CLINIC VISIT    Patient Name:  Carlton Bravo  Date:  2019  Contact Type:  Telephone    SUBJECTIVE:  Patient Findings     Positives:   Change in diet/appetite    Comments:   Eating more liang-high vitamin k        Clinical Outcomes     Negatives:   Major bleeding event, Thromboembolic event, Anticoagulation-related hospital admission, Anticoagulation-related ED visit, Anticoagulation-related fatality    Comments:   Eating more liang-high vitamin k           OBJECTIVE    INR Protime   Date Value Ref Range Status   2019 1.7 (A) 0.86 - 1.14 Final       ASSESSMENT / PLAN  INR assessment SUB    Recheck INR In: 1 WEEK    [facility]R Location [facility]      Anticoagulation Summary  As of 2019    INR goal:   2.0-3.0   TTR:   61.3 % (1 y)   INR used for dosin.7! (2019)   Warfarin maintenance plan:   1 mg (2 mg x 0.5) every Fri; 2 mg (2 mg x 1) all other days   Full warfarin instructions:   : 3 mg; Otherwise 1 mg every Fri; 2 mg all other days   Weekly warfarin total:   13 mg   Plan last modified:   Wilda Metcalf RN (2019)   Next INR check:   2019   Priority:   INR   Target end date:       Indications    Atrial fibrillation (HCC) [I48.91] [I48.91]  Long term current use of anticoagulant therapy [Z79.01]             Anticoagulation Episode Summary     INR check location:       Preferred lab:       Send INR reminders to:   MIGUEL ANGEL DALEY    Comments:   Take in AM. possible chromogenic 10/antiphosholipid syndrome      Anticoagulation Care Providers     Provider Role Specialty Phone number    Lona Pettit Nathalie, APRN [facility] [phone]            See the Encounter Report to view Anticoagulation Flowsheet and Dosing Calendar (Go to Encounters tab in chart review, and find the Anticoagulation Therapy Visit)    Loaded dose tonight (extra 1 mg) and increase maintenance dose by 1 mg (8.3%).    Wilda Metcalf RN

## 2019-12-17 RX ORDER — CARVEDILOL 6.25 MG/1
TABLET ORAL
Qty: 360 TABLET | Refills: 0 | Status: SHIPPED | OUTPATIENT
Start: 2019-12-17 | End: 2020-02-25

## 2019-12-17 NOTE — TELEPHONE ENCOUNTER
LOV- 2/14. Per last refill, patient should have ran out of Coreg in April. [facility] doesn't set up his medications. Patient states he has been taking it. Prescription approved per refill protocol.    Tiana Joseph RN

## 2019-12-23 ENCOUNTER — ANTICOAGULATION THERAPY VISIT (OUTPATIENT)
Dept: PEDIATRICS | Facility: CLINIC | Age: 79
End: 2019-12-23
Payer: COMMERCIAL

## 2019-12-23 DIAGNOSIS — I48.91 ATRIAL FIBRILLATION (H): ICD-10-CM

## 2019-12-23 DIAGNOSIS — Z79.01 LONG TERM CURRENT USE OF ANTICOAGULANT THERAPY: ICD-10-CM

## 2019-12-23 LAB — INR PPP: 2.3 (ref 0.9–1.1)

## 2019-12-23 PROCEDURE — 99207 ZZC NO CHARGE NURSE ONLY: CPT | Performed by: NURSE PRACTITIONER

## 2019-12-23 NOTE — PROGRESS NOTES
ANTICOAGULATION FOLLOW-UP CLINIC VISIT    Patient Name:  Carlton Bravo  Date:  2019  Contact Type:  Telephone    SUBJECTIVE:  Patient Findings     Positives:   Change in diet/appetite    Comments:   No liang this week, usually doesn't consume but had some last week causing subtherapeutic INR.        Clinical Outcomes     Negatives:   Major bleeding event, Thromboembolic event, Anticoagulation-related hospital admission, Anticoagulation-related ED visit, Anticoagulation-related fatality    Comments:   No liang this week, usually doesn't consume but had some last week causing subtherapeutic INR.           OBJECTIVE    INR   Date Value Ref Range Status   2019 2.3 (A) 0.90 - 1.10 Final       ASSESSMENT / PLAN  INR assessment THER    Recheck INR In: 1 WEEK    [facility]R Location [facility]      Anticoagulation Summary  As of 2019    INR goal:   2.0-3.0   TTR:   61.7 % (1 y)   INR used for dosin.3 (2019)   Warfarin maintenance plan:   2 mg (2 mg x 1) every day   Full warfarin instructions:   2 mg every day   Weekly warfarin total:   14 mg   Plan last modified:   Wilda Metcalf RN (2019)   Next INR check:   2019   Priority:   INR   Target end date:       Indications    Atrial fibrillation (HCC) [I48.91] [I48.91]  Long term current use of anticoagulant therapy [Z79.01]             Anticoagulation Episode Summary     INR check location:       Preferred lab:       Send INR reminders to:   MIGUEL ANGEL DALEY    Comments:   Take in AM. possible chromogenic 10/antiphosholipid syndrome      Anticoagulation Care Providers     Provider Role Specialty Phone number    PettitLona, APRN [facility] [phone]            See the Encounter Report to view Anticoagulation Flowsheet and Dosing Calendar (Go to Encounters tab in chart review, and find the Anticoagulation Therapy Visit)        Wilda Metcalf RN

## 2019-12-30 ENCOUNTER — TELEPHONE (OUTPATIENT)
Dept: PEDIATRICS | Facility: CLINIC | Age: 79
End: 2019-12-30

## 2019-12-30 ENCOUNTER — PATIENT OUTREACH (OUTPATIENT)
Dept: GERIATRIC MEDICINE | Facility: CLINIC | Age: 79
End: 2019-12-30

## 2019-12-30 ENCOUNTER — TELEPHONE (OUTPATIENT)
Dept: PEDIATRICS | Facility: CLINIC | Age: 79
End: 2019-12-30
Payer: COMMERCIAL

## 2019-12-30 DIAGNOSIS — Z79.01 LONG TERM CURRENT USE OF ANTICOAGULANT THERAPY: ICD-10-CM

## 2019-12-30 DIAGNOSIS — I48.91 ATRIAL FIBRILLATION (H): ICD-10-CM

## 2019-12-30 LAB — INR PPP: 2.6 (ref 0.9–1.1)

## 2019-12-30 PROCEDURE — 99207 ZZC NO CHARGE NURSE ONLY: CPT | Performed by: NURSE PRACTITIONER

## 2019-12-30 NOTE — TELEPHONE ENCOUNTER
"Called Ramila, left message with phone number to call back.     Called patient and informed of Dania's message.  He became very upset and stated that he is confined to his home, he cannot get to the clinic for appointments.  He states \"if she cannot appreciate that fact...\"    Suggested a medical van to bring him to the clinic, he said \"NO! We are not doing that!\"    He states if he cannot get PT as suggested due to office visit is needed,  then that's how it'll be.      When Ramila calls back, please inform and route to Dania Pettit NP.    Nisha Blanco RN, [facility]    "

## 2019-12-30 NOTE — TELEPHONE ENCOUNTER
[facility] Call Center    Phone Message    May a detailed message be left on voicemail: yes    Reason for Call: Order(s): Home Care Orders: Physical Therapy (PT): Home care nurse calling for verbal orders to obtain PT Eval care for pt.     Please call Ramila home care nurse back.    Action Taken: Message routed to:  Primary Care p [phone]

## 2019-12-30 NOTE — TELEPHONE ENCOUNTER
LOV- 2/14. ER on 12/24.   Patient was contacted earlier today by gerontology CC to schedule with PCP but patient has not.  Tiana Joseph RN

## 2019-12-30 NOTE — PROGRESS NOTES
12/27/19:  [facility] Care Coordination Contact  CC received notification of Emergency Room visit.  ER visit occurred on 12/24/19 at [facility]  with Dx of weakness and alcohol intoxification.    CC contacted member and left a message requesting a return call.  Member has a follow-up appointment with PCP: No: Offered Assistance with setting up a follow up appointment  Member has had a change in condition: No - per review of ED note member has had a few falls that he has needed to call police for assistance up d/t weakness  - waiting to f/u with member to assess needs and assistance needed.   New referrals placed: No  Home Visit Needed: No - possibly - waiting to discuss with member.   Care plan reviewed and updated.  PCP notified of ED visit via EMR.    Merari Reyna RN, PHN  [facility]

## 2019-12-30 NOTE — TELEPHONE ENCOUNTER
He has to be seen at least once a year so will need an appointment at minimum by February for us to continue to refill his medications   So he needs to make an appointment at minimum by next month  I am fine with the physical therapy in the meantime

## 2019-12-30 NOTE — TELEPHONE ENCOUNTER
Spoke to [facility] RN.  The patient had called her to report the conversation.  He states he is basically not  Planning on coming to the clinic anytime in the near future.    Patient is on [employer] MA,  does not require face to face.    Ramila states patient is using Coreg is 6.35 mg once per day instead of 2 times per day, but his BP is low to normal and HR is stable.      He had ED visit and had an EKG which was normal.    Patient is basically saying that if he cannot get his medications due to not coming in for office visits, then he simply won't take any anymore    Routing to Dania Pettit NP to please review when able.    Nisha Blanco RN, [facility]

## 2019-12-30 NOTE — PROGRESS NOTES
Care Coordinator received vm from Antonio Mcgrath with [facility] letting Care Coordinator know about ED visit.  She states he had 4 falls recently  - Member received walker from ED.  She feels that member is weaker so would like to do PT eval for home safety.  She states they did talk about alcohol use and member is not willing to change.      Merari Reyna RN, N  [facility]

## 2019-12-30 NOTE — TELEPHONE ENCOUNTER
Please let patient know he needs follow up appointment as we can not sign off his orders without an appointment as he is Medicare and he has not been in office since February   Also his hospital  should have his appointment be with MD as he is home care with Medicare orders.     He also has to make an appointment with cardiology in order to get his medications refilled

## 2019-12-30 NOTE — TELEPHONE ENCOUNTER
ANTICOAGULATION FOLLOW-UP CLINIC VISIT    Patient Name:  Carlton Bravo  Date:  2019  Contact Type:  Face to Face    SUBJECTIVE:  Patient Findings     Positives:   Emergency department visit, Other complaints    Comments:   Falls x 4 over the past week. Seen at ED on . Negative work up. Currently using a walker. Did not hit head or seriously injure himself. He will meet with PT. If falls again and it is serious or hits head should be taken to ED for eval. If unsure if the fall requires an ED visit should contact home RN or INR nurse to discuss.        Clinical Outcomes     Negatives:   Major bleeding event, Thromboembolic event, Anticoagulation-related hospital admission, Anticoagulation-related ED visit, Anticoagulation-related fatality    Comments:   Falls x 4 over the past week. Seen at ED on . Negative work up. Currently using a walker. Did not hit head or seriously injure himself. He will meet with PT. If falls again and it is serious or hits head should be taken to ED for eval. If unsure if the fall requires an ED visit should contact home RN or INR nurse to discuss.           OBJECTIVE    INR   Date Value Ref Range Status   2019 2.6 (A) 0.90 - 1.10 Final       ASSESSMENT / PLAN      Anticoagulation Summary  As of 2019    INR goal:   2.0-3.0   TTR:   62.9 % (1 y)   INR used for dosin.6 (2019)   Warfarin maintenance plan:   2 mg (2 mg x 1) every day   Full warfarin instructions:   2 mg every day   Weekly warfarin total:   14 mg   No change documented:   Wilda Metcalf RN   Plan last modified:   Wilda Metcalf RN (2019)   Next INR check:   2020   Priority:   INR   Target end date:       Indications    Atrial fibrillation (HCC) [I48.91] [I48.91]  Long term current use of anticoagulant therapy [Z79.01]             Anticoagulation Episode Summary     INR check location:       Preferred lab:       Send INR reminders to:   MIGUEL ANGEL DALEY    Comments:    Take in AM. possible chromogenic 10/antiphosholipid syndrome      Anticoagulation Care Providers     Provider Role Specialty Phone number    Lona Pettit APRN CNP Responsible Family Practice [phone]            See the Encounter Report to view Anticoagulation Flowsheet and Dosing Calendar (Go to Encounters tab in chart review, and find the Anticoagulation Therapy Visit)        Wilda Metcalf RN

## 2020-01-01 ENCOUNTER — TELEPHONE (OUTPATIENT)
Dept: CARE COORDINATION | Facility: CLINIC | Age: 80
End: 2020-01-01

## 2020-01-01 ENCOUNTER — PATIENT OUTREACH (OUTPATIENT)
Dept: GERIATRIC MEDICINE | Facility: CLINIC | Age: 80
End: 2020-01-01

## 2020-01-01 ENCOUNTER — VIRTUAL VISIT (OUTPATIENT)
Dept: PEDIATRICS | Facility: CLINIC | Age: 80
End: 2020-01-01
Payer: COMMERCIAL

## 2020-01-01 DIAGNOSIS — I48.11 LONGSTANDING PERSISTENT ATRIAL FIBRILLATION (H): Primary | ICD-10-CM

## 2020-01-01 DIAGNOSIS — F10.10 ALCOHOL ABUSE: ICD-10-CM

## 2020-01-01 DIAGNOSIS — I10 HTN, GOAL BELOW 140/90: ICD-10-CM

## 2020-01-01 DIAGNOSIS — I42.9 IDIOPATHIC CARDIOMYOPATHY (H): ICD-10-CM

## 2020-01-01 DIAGNOSIS — S06.5XAA SUBDURAL HEMATOMA (H): ICD-10-CM

## 2020-01-01 DIAGNOSIS — E78.5 HYPERLIPIDEMIA LDL GOAL <100: ICD-10-CM

## 2020-01-01 DIAGNOSIS — R26.89 POOR BALANCE: ICD-10-CM

## 2020-01-01 DIAGNOSIS — Z53.9 DIAGNOSIS NOT YET DEFINED: Primary | ICD-10-CM

## 2020-01-01 PROCEDURE — 99214 OFFICE O/P EST MOD 30 MIN: CPT | Mod: 95 | Performed by: INTERNAL MEDICINE

## 2020-01-01 PROCEDURE — G0179 MD RECERTIFICATION HHA PT: HCPCS | Performed by: INTERNAL MEDICINE

## 2020-01-06 NOTE — PROGRESS NOTES
Care Coordinator received call from MATEUSZ Porras with [facility].  He was at member's home - member managing well with his walker that he was given at ED.   Member would like to look into a walker tray as well as toilet safety frame.  Member would need install as well.   Care Coordinator will call [employer] and get quote as EW items and member has EW obligation.  Member would like to know cost before ordering.    Care Coordinator placed call to [employer] - spoke with Te.  He will get quotes to Care Coordinator.     Merari Reyna RN, PHN  [facility]

## 2020-01-08 DIAGNOSIS — K25.0 ACUTE GASTRIC ULCER WITH HEMORRHAGE: ICD-10-CM

## 2020-01-08 RX ORDER — PANTOPRAZOLE SODIUM 40 MG/1
TABLET, DELAYED RELEASE ORAL
Qty: 90 TABLET | Refills: 0 | Status: SHIPPED | OUTPATIENT
Start: 2020-01-08 | End: 2020-01-13

## 2020-01-08 NOTE — LETTER
January 8, 2020      Carlton Bravo  [address]  [place] [address]              Dear Carlton,      We recently received a refill request from your pharmacy requesting a refill of : Protonix.    A review of your chart indicates that your last office visit was on 2/14.  An appointment is required every year with your provider with labs. We have authorized a one time refill of your medication to allow time for you to schedule.     Please call the clinic at [phone], or log in to your Epoq account at [email or web address] to schedule your appointment within that time frame so there is no delay in next month's refill.    Thank you for taking an active role in your healthcare.    Sincerely,        Maurice Duggan MD    If you need assistance with your Epoq log in, please call [phone].

## 2020-01-13 ENCOUNTER — TELEPHONE (OUTPATIENT)
Dept: PEDIATRICS | Facility: CLINIC | Age: 80
End: 2020-01-13
Payer: COMMERCIAL

## 2020-01-13 DIAGNOSIS — Z79.01 LONG TERM CURRENT USE OF ANTICOAGULANT THERAPY: ICD-10-CM

## 2020-01-13 DIAGNOSIS — I48.91 ATRIAL FIBRILLATION (H): ICD-10-CM

## 2020-01-13 PROCEDURE — 99207 ZZC NO CHARGE NURSE ONLY: CPT | Performed by: NURSE PRACTITIONER

## 2020-01-13 RX ORDER — PANTOPRAZOLE SODIUM 40 MG/1
40 TABLET, DELAYED RELEASE ORAL DAILY
Qty: 90 TABLET | Refills: 2 | Status: SHIPPED | OUTPATIENT
Start: 2020-01-13 | End: 2020-02-25

## 2020-01-13 NOTE — TELEPHONE ENCOUNTER
Refill completed.   Has to be seen at least once a year for us to prescribe medications  Needs to be seen by February with MD

## 2020-01-13 NOTE — TELEPHONE ENCOUNTER
Called Ramila [facility] RN, to see if INR was checked as of yet. TCB.    Wilda Metcalf, RN on 1/13/2020 at 2:14 PM

## 2020-01-13 NOTE — TELEPHONE ENCOUNTER
Letter was sent that patient is due yearly per protocol on 1/8/2020.  Conversation over the phone multiple times discussing this with same response, that he will not be coming in, he verbalized he would be fine with running out of medications.    Nisha Blanco RN, [facility]

## 2020-01-13 NOTE — TELEPHONE ENCOUNTER
Hang called back. She will check the patient's INR tomorrow. She knows the INR nurse will not call back until after normal clinic hours to discuss results.  Call patient and Ramila to discuss.    Wilda Metcalf RN on 1/13/2020 at 2:24 PM

## 2020-01-13 NOTE — TELEPHONE ENCOUNTER
[facility] Call Center    Phone Message    May a detailed message be left on voicemail: yes    Reason for Call: Other: patient states that he has home care nurses that come see him. He cannot come into the clinic, can the nurse take the lab work thats needed and they bring it in. He states he was already in the hopsital recently too.  He does not want to come in.    Action Taken: Message routed to:  Primary Care p [phone]

## 2020-01-14 ENCOUNTER — DOCUMENTATION ONLY (OUTPATIENT)
Dept: CARE COORDINATION | Facility: CLINIC | Age: 80
End: 2020-01-14

## 2020-01-14 LAB — INR PPP: 1.9 (ref 0.9–1.1)

## 2020-01-14 NOTE — TELEPHONE ENCOUNTER
ANTICOAGULATION FOLLOW-UP CLINIC VISIT    Patient Name:  Carlton Bravo  Date:  2020  Contact Type:  Telephone/ Ramila unavailable, directions given to the patient.    SUBJECTIVE:  Patient Findings     Positives:   Change in diet/appetite    Comments:   Frank diet green tea every other day half cup.        Clinical Outcomes     Negatives:   Major bleeding event, Thromboembolic event, Anticoagulation-related hospital admission, Anticoagulation-related ED visit, Anticoagulation-related fatality    Comments:   Frank diet green tea every other day half cup.           OBJECTIVE    INR   Date Value Ref Range Status   2020 1.9 (A) 0.90 - 1.10 Final       ASSESSMENT / PLAN      Anticoagulation Summary  As of 2020    INR goal:   2.0-3.0   TTR:   63.5 % (1 y)   INR used for dosin.9! (2020)   Warfarin maintenance plan:   3 mg (2 mg x 1.5) every Wed; 2 mg (2 mg x 1) all other days   Full warfarin instructions:   3 mg every Wed; 2 mg all other days   Weekly warfarin total:   15 mg   Plan last modified:   Wilda Metcalf RN (2020)   Next INR check:   2020   Priority:   INR   Target end date:       Indications    Atrial fibrillation (HCC) [I48.91] [I48.91]  Long term current use of anticoagulant therapy [Z79.01]             Anticoagulation Episode Summary     INR check location:       Preferred lab:       Send INR reminders to:   MIGUEL ANGEL DALEY    Comments:   Take in AM. possible chromogenic 10/antiphosholipid syndrome      Anticoagulation Care Providers     Provider Role Specialty Phone number    Kimberly PettitNI Matos CNP Responsible Family Practice [phone]            See the Encounter Report to view Anticoagulation Flowsheet and Dosing Calendar (Go to Encounters tab in chart review, and find the Anticoagulation Therapy Visit)        Wilda Metcalf RN

## 2020-01-14 NOTE — PROGRESS NOTES
[facility] utilizes an encounter to take the place of a direct phone call to your office. Please take a moment to review the below request. Please reply or route message to author of this encounter.  Message will act as a verbal OK of orders requested below. Thank you.    ORDER    MD SUMMARY/PLAN OF CARE    SUMMARY TO MD  S/  79 yr old Male being recertified for home care services for ongoing coordination of care and INR/labs. Over past 60 days, patient had 4 falls within 2 days requiring ER visit where pt diagnosed with degenerative joint disease of left knee. Pt was given a walker and states that has helped immensely. Also ordered PT eval where they came out, evaluated him, gave recommendations. Pt refused any additional visits.  Pt reports it being very taxing to leave home and difficult to get to clinic for INR checks. 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 Denies any issues with anxiety or depression. IADL assistance is provided by delivery systems such as [employer] and will have [facility] deliver medications. Pt has close friend that assists with tiffany rea/ MD appointments. Medication reconciliation was completed patient is knowledgeable of medication dosing, frequency, route and purpose of all meds. Unable to find a provider that will see the patient in his home, and patient reports that he absolutely will not leave his condo to go to MD under any circumstances, even if he can no longer have homecare and prescriptions will not be renewed. PT still having some orthostatic hypotension. Seated BP today 114/72. Standing is 90/60. Pulse remains stable around 80. Pt is working on increasing fluids, but is unable to get to 64 ounce/day. Reports he is still working on this.   B/  Lives alone in [place] with a fair support system in place.  history of GI bleed, falls, traumatic wounds, idiopathic  cardiomyopathy, alcoholism, HTN                   A/  Pt is at risk for hospitalization r/t Chronic disease mgmt, hx of falls/fall risk, ETOH consumption on a daily basis and  dependent on others for IADL needs. Pt needs nursing mainly for INR draws/Coumadin management.   R/  SN visits 2x mo x2xmo [id number], 5 prn visits    Ramila Valverde RN,   [facility]  [phone]  [email or web address]

## 2020-01-15 ENCOUNTER — DOCUMENTATION ONLY (OUTPATIENT)
Dept: CARE COORDINATION | Facility: CLINIC | Age: 80
End: 2020-01-15

## 2020-01-15 NOTE — PROGRESS NOTES
[facility] utilizes an encounter to take the place of a direct phone call to your office. Please take a moment to review the below request. Please reply or route message to author of this encounter.  Message will act as a verbal OK of orders requested below. Thank you.    MD SUMMARY/PLAN OF CARE    Will you approve his homecare orders? This was not addressed on last note response.     Also, he does not want to come into the clinic because it is very difficult for him and he is homebound. He had 4 recent falls which landed him in the ER and he does not think he can physically make it to the clinic.  Since I am seeing him, he feels that all cares can be done through homecare (labs etc) and since he had a recent ER visit where they did a complete workup, that should work for seeing a provider recently. He is quite stubborn, but he is trying to make some changes including using the walker and working on increasing fluids. He is alert and oriented and shows no signs of confusion and is capable of making his own decisions. I have checked with the complex care team to see if they can see him in his own home, but unfortunately they are not taking any new patients right now.     Ramila Valverde RN,   [facility]  [phone]  [email or web address]

## 2020-01-15 NOTE — PROGRESS NOTES
Would he qualify for palliative care as sounds like does not want anything invasive and do they do home visits?  If he home care is not under Medicare at this time can continue through February hoping we can help him understand he needs a follow up appointment

## 2020-01-20 ENCOUNTER — DOCUMENTATION ONLY (OUTPATIENT)
Dept: CARE COORDINATION | Facility: CLINIC | Age: 80
End: 2020-01-20

## 2020-01-20 NOTE — PROGRESS NOTES
[facility] utilizes an encounter to take the place of a direct phone call to your office. Please take a moment to review the below request. Please reply or route message to author of this encounter.  Message will act as a verbal OK of orders requested below. Thank you.    ORDER  SN [id number], [id number]. 4 prn visits. Pt will need to be seen in clinic no later than the end of February to continue with homecare services/medications.     Ramila Valverde RN,   [facility]  [phone]  [email or web address]

## 2020-01-20 NOTE — PROGRESS NOTES
Care Coordinator received quote from [employer] for member's toilet safety frame + install and walker tray.   Care Coordinator placed call to member with quotes - he states that EW obligation is too complicated and he can more easily order off [employer].  He states he plans to do this and then hung up on Care Coordinator.  Care Coordinator called back and left a vm. Care Coordinator left vm for Vern PT.     Care Coordinator also received vm from KACI Mcgrath at [facility] that member is do for annual exam and also due for recert and med refills.   Member is refusing to be seen and Ramila states she has called complex care clinic but they are not seeing new patients.  Care Coordinator left vm for member.       Member called Care Coordinator back.  He states it's not that he doesn't want to go see PCP - it's that he can't get out of his home.  Care Coordinator educated member on w/c van service - he declined.  Care Coordinator asked about his friend, Reggie.  He states he can't help him.  He stated that Care Coordinator and Ramila could talk but he wasn't going to see PCP and if they won't refill meds he will just stay at home and not take.  He also states Ramila has taken his labs in past plus just had labs last ED.  He feels that seeing PCP is waste of his time.       Care Coordinator received another call back from member stating that discussion on w/c van/special transportation intrigued him.  He states that he would possibly do that as last resort and would discuss with Ramila.     Care Coordinator relayed above information to Ramila.  She will talk to member and get back to Care Coordinator.     Merari Reyna RN, PHN  [facility]

## 2020-01-27 ENCOUNTER — TELEPHONE (OUTPATIENT)
Dept: PEDIATRICS | Facility: CLINIC | Age: 80
End: 2020-01-27
Payer: COMMERCIAL

## 2020-01-27 DIAGNOSIS — I48.91 ATRIAL FIBRILLATION (H): ICD-10-CM

## 2020-01-27 DIAGNOSIS — Z79.01 LONG TERM CURRENT USE OF ANTICOAGULANT THERAPY: ICD-10-CM

## 2020-01-27 LAB — INR PPP: 1.9 (ref 0.9–1.1)

## 2020-01-27 PROCEDURE — 99207 ZZC NO CHARGE NURSE ONLY: CPT | Performed by: NURSE PRACTITIONER

## 2020-01-27 NOTE — TELEPHONE ENCOUNTER
ANTICOAGULATION FOLLOW-UP CLINIC VISIT    Patient Name:  Carlton Bravo  Date:  2020  Contact Type:  Telephone    SUBJECTIVE:  Patient Findings         Clinical Outcomes     Negatives:   Major bleeding event, Thromboembolic event, Anticoagulation-related hospital admission, Anticoagulation-related ED visit, Anticoagulation-related fatality           OBJECTIVE    INR   Date Value Ref Range Status   2020 1.9 (A) 0.90 - 1.10 Final       ASSESSMENT / PLAN      Anticoagulation Summary  As of 2020    INR goal:   2.0-3.0   TTR:   60.0 % (1 y)   INR used for dosin.9! (2020)   Warfarin maintenance plan:   3 mg (2 mg x 1.5) every Mon, Fri; 2 mg (2 mg x 1) all other days   Full warfarin instructions:   3 mg every Mon, Fri; 2 mg all other days   Weekly warfarin total:   16 mg   Plan last modified:   Wilda Metcalf RN (2020)   Next INR check:   2/10/2020   Priority:   INR   Target end date:       Indications    Atrial fibrillation (HCC) [I48.91] [I48.91]  Long term current use of anticoagulant therapy [Z79.01]             Anticoagulation Episode Summary     INR check location:       Preferred lab:       Send INR reminders to:   MIGUEL ANGEL DALEY    Comments:   Take in AM. possible chromogenic 10/antiphosholipid syndrome      Anticoagulation Care Providers     Provider Role Specialty Phone number    Lona Pettit APRN [facility] [phone]            See the Encounter Report to view Anticoagulation Flowsheet and Dosing Calendar (Go to Encounters tab in chart review, and find the Anticoagulation Therapy Visit)        Wilda Metcalf RN

## 2020-01-27 NOTE — PROGRESS NOTES
1/27/20: Care Coordinator received vm from Antonio Mcgrath with [facility] stating that her and member made pcp visit for 2/18 starting at 8:50 for labs.   Requesting special transportation - Ramila states member does not have own w/c - only has walker and is wondering if trans company provides the w/c. Care Coordinator sent ride request to [employer] to arrange and see if company can provide the w/c.      Merari Reyna RN, N  [facility]

## 2020-01-28 NOTE — PROGRESS NOTES
1/28/20: Care Coordinator spoke with KACI Mcgrath with [facility] - she states that she believes member will want w/c door to door.   Care Coordinator placed call to member  - he states that he declines to use his walker and needs w/c door to door.  Sent request to CMS to schedule with provider that has w/c door to door if possible otherwise Care Coordinator will have to find one to borrow.     Care Coordinator also discussed annual HRA visit with member - he would like Care Coordinator to come with Ramila on 2/10.  Care Coordinator left Ramila  re: time on 2/10.     Merari Reyna RN, PHN  [facility]

## 2020-01-30 ENCOUNTER — PATIENT OUTREACH (OUTPATIENT)
Dept: GERIATRIC MEDICINE | Facility: CLINIC | Age: 80
End: 2020-01-30

## 2020-01-30 NOTE — PROGRESS NOTES
1/30/20: [employer], scheduled ride - they will provide w/c.  Care Coordinator notified member.       Merari Reyna RN, N  [facility]

## 2020-01-30 NOTE — PROGRESS NOTES
[facility] Care Coordination Contact    Arranged transportation thru [facility] for the below appt:  Appt Date & Time: 2/18/20 at 8:50AM  Clinic Name & Address:  [facility]  Transportation Provider:  ([phone])   time:  7:50-8:15AM (return ride scheduled for 10:15AM)    This was scheduled as [facility], door to door, and  is providing the WC.     Notified member of  time.    Lulu Bean  Care Management Specialist  [facility]  [phone]

## 2020-02-06 DIAGNOSIS — Z53.9 DIAGNOSIS NOT YET DEFINED: Primary | ICD-10-CM

## 2020-02-06 PROCEDURE — G0179 MD RECERTIFICATION HHA PT: HCPCS | Performed by: INTERNAL MEDICINE

## 2020-02-10 ENCOUNTER — TELEPHONE (OUTPATIENT)
Dept: PEDIATRICS | Facility: CLINIC | Age: 80
End: 2020-02-10
Payer: COMMERCIAL

## 2020-02-10 ENCOUNTER — PATIENT OUTREACH (OUTPATIENT)
Dept: GERIATRIC MEDICINE | Facility: CLINIC | Age: 80
End: 2020-02-10

## 2020-02-10 DIAGNOSIS — Z76.89 HEALTH CARE HOME: ICD-10-CM

## 2020-02-10 DIAGNOSIS — I48.91 ATRIAL FIBRILLATION (H): ICD-10-CM

## 2020-02-10 DIAGNOSIS — Z79.01 LONG TERM CURRENT USE OF ANTICOAGULANT THERAPY: ICD-10-CM

## 2020-02-10 LAB — INR PPP: 2.8 (ref 0.9–1.1)

## 2020-02-10 PROCEDURE — 99207 ZZC NO CHARGE NURSE ONLY: CPT | Performed by: NURSE PRACTITIONER

## 2020-02-10 NOTE — PROGRESS NOTES
[facility] Care Coordination Contact    [facility] Home Visit Assessment     Home visit for Health Risk Assessment with Carlton Bravo completed on February 10, 2020    Type of residence:: Town home  Current living arrangement:: I live alone     Assessment completed with:: Patient    Current Care Plan  Member currently receiving the following home care services: Skilled Nursing   Member currently receiving the following community resources:        Medication Review  Medication reconciliation completed in Epic: Yes  Medication set-up & administration: Independent and sets up on own daily.  Self-administers medications.  Medication Risk Assessment Medication (1 or more, place referral to MTM): N/A: No risk factors identified  MTM Referral Placed: No: No risk factors idenified    Mental/Behavioral Health   Depression Screening: See PHQ assessment flowsheet.   Mental health DX:: No      Mental Health Diagnosis: No  Mental Health Services: None: No further intervention needed at this time.    Falls Assessment:   Fallen 2 or more times in the past year?: Yes   Any fall with injury in the past year?: Yes    ADL/IADL Dependencies:   Dependent ADLs:: Bathing  Dependent IADLs:: Cleaning, Shopping, Meal Preparation, Money Management, Transportation    [facility] sponsored benefits: Shared information re: Silver Sneakers/gym memberships, ASA, Calcium +D.    PCA Assessment completed at visit: Not applicable     Elderly Waiver Eligibility: Yes-will continue on EW    Care Plan & Recommendations: RN visit.    Keep EW open at this time for equip and possible bathroom remodel.    See CC for detailed assessment information.    Follow-Up Plan: Member informed of future contact, plan to f/u with member with a 6 month telephone assessment.  Contact information shared with member and family, encouraged member to call with any questions or concerns at any time.    [place] care continuum providers: Please refer to  [facility] on the [facility] Problem List to view this patient's [facility] Care Plan Summary.    Merari Reyna RN, PHN  [facility]

## 2020-02-10 NOTE — TELEPHONE ENCOUNTER
ANTICOAGULATION FOLLOW-UP CLINIC VISIT    Patient Name:  Carlton Bravo  Date:  2/10/2020  Contact Type:  Telephone    SUBJECTIVE:  Patient Findings         Clinical Outcomes     Negatives:   Major bleeding event, Thromboembolic event, Anticoagulation-related hospital admission, Anticoagulation-related ED visit, Anticoagulation-related fatality           OBJECTIVE    INR   Date Value Ref Range Status   02/10/2020 2.8 (A) 0.90 - 1.10 Final       ASSESSMENT / PLAN      Anticoagulation Summary  As of 2/10/2020    INR goal:   2.0-3.0   TTR:   61.0 % (1 y)   INR used for dosin.8 (2/10/2020)   Warfarin maintenance plan:   3 mg (2 mg x 1.5) every Mon, Fri; 2 mg (2 mg x 1) all other days   Full warfarin instructions:   3 mg every Mon, Fri; 2 mg all other days   Weekly warfarin total:   16 mg   Plan last modified:   Wilda Metcalf RN (2020)   Next INR check:   2020   Priority:   INR   Target end date:       Indications    Atrial fibrillation (HCC) [I48.91] [I48.91]  Long term current use of anticoagulant therapy [Z79.01]             Anticoagulation Episode Summary     INR check location:       Preferred lab:       Send INR reminders to:   MIGUEL ANGEL DALEY    Comments:   Take in AM. possible chromogenic 10/antiphosholipid syndrome      Anticoagulation Care Providers     Provider Role Specialty Phone number    Lona Pettit APRN [facility] [phone]            See the Encounter Report to view Anticoagulation Flowsheet and Dosing Calendar (Go to Encounters tab in chart review, and find the Anticoagulation Therapy Visit)        Wilda Metcalf RN

## 2020-02-12 NOTE — PROGRESS NOTES
Care Coordinator received call from member - he wants to look into incontinence pads/briefs - he would like Care Coordinator to email him pics of the different types.     Care Coordinator emailed him pics of pullups, tabbed briefs, pads.     Member would like pull ups size med 3/day monthly supply.   Placed order with [facility] - recommended size large based on ht/wt.   Member aware.     Merari Reyna RN, PHN  [facility]

## 2020-02-14 ENCOUNTER — PATIENT OUTREACH (OUTPATIENT)
Dept: GERIATRIC MEDICINE | Facility: CLINIC | Age: 80
End: 2020-02-14

## 2020-02-14 NOTE — PROGRESS NOTES
Care Coordinator called [employer] to confirm ride for 2/18 and door to door with w/c services.  Confirmed that they will arrive and go to member's door in [facility].     Merari Reyna RN, N  [facility]

## 2020-02-14 NOTE — LETTER
February 14, 2020      CARLTON FERRELL  [address]  [place] [address]      Dear Carlton:    At [facility], we are dedicated to improving your health and well-being. Enclosed is the Comprehensive Care Plan that we developed with you on 2/10/2020. Please review the Care Plan carefully.    As a reminder, some of the things we discussed at your visit include:    Your physical and mental health    Ways to reduce falls    Health care needs you may have    Don t forget to contact your care coordinator if you:    Have been hospitalized or plan to be hospitalized     Have had a fall     Have experienced a change in physical health    Are experiencing emotional problems     If you do not agree with your Care Plan, have questions about it, or have experienced a change in your needs, please call me at [phone]. If you are hearing impaired, please call the [place] Relay at 423 or [phone] (okttak-dq-uvhjry relay service).    Sincerely,    Merari Reyna RN, N    E-mail: [email or web address]  Phone: [phone]      [facility] (hospitals) is a health plan that contracts with both Medicare and the [place] Medical Assistance (Medicaid) program to provide benefits of both programs to enrollees. Enrollment in [facility] depends on contract renewal.    [id number])     [id number] (11/18)

## 2020-02-14 NOTE — PROGRESS NOTES
[facility] Care Coordination Contact    Received after visit chart from care coordinator.  Completed following tasks: Mailed copy of care plan to client and Updated services in access  Chart was returned to CC.    and Provider Signature - No POC Shared:  Member indicates that they do not want their POC shared with any EW providers.     Lulu Bean  Care Management Specialist  [facility]  [phone]

## 2020-02-18 ENCOUNTER — PATIENT OUTREACH (OUTPATIENT)
Dept: GERIATRIC MEDICINE | Facility: CLINIC | Age: 80
End: 2020-02-18

## 2020-02-18 NOTE — PROGRESS NOTES
[facility] Care Coordination Contact     Arranged transportation thru [facility] PAR for the below appt:  Appt Date & Time: 2/25/20 at 8:50AM  Clinic Name & Address:  [facility]  Transportation Provider:  ([phone])   time:  7:50-8:15AM (return ride scheduled for 10:15AM)     This was scheduled as STS, door to door, and  is providing the WC.      Notified member of  time.     Lulu Bean  Care Management Specialist  [facility]  [phone]

## 2020-02-18 NOTE — PROGRESS NOTES
Care Coordinator received call from [employer] that they were running late due to weather - they have been in contact with member but also wanted Care Coordinator to know.      Care Coordinator received call from member that ride was not there yet and appt time was already past - Care Coordinator called [employer] and canceled ride.   Care Coordinator also called clinic to reschedule - Care Coordinator discussed with member re: NP vs. MD.  He was in agreement to reschedule with Dr. Santana.      Care Coordinator rescheduled appt to 2/25 - member aware and Care Coordinator sent to CMS to reschedule ride for 2/25.     Merari Reyna, RN, PHN  [facility]

## 2020-02-24 ENCOUNTER — TELEPHONE (OUTPATIENT)
Dept: PEDIATRICS | Facility: CLINIC | Age: 80
End: 2020-02-24
Payer: COMMERCIAL

## 2020-02-24 DIAGNOSIS — Z79.01 LONG TERM CURRENT USE OF ANTICOAGULANT THERAPY: ICD-10-CM

## 2020-02-24 DIAGNOSIS — I48.91 ATRIAL FIBRILLATION, UNSPECIFIED TYPE (H): ICD-10-CM

## 2020-02-24 LAB — INR PPP: 2.6 (ref 0.9–1.1)

## 2020-02-24 PROCEDURE — 99207 ZZC NO CHARGE NURSE ONLY: CPT | Performed by: NURSE PRACTITIONER

## 2020-02-24 NOTE — TELEPHONE ENCOUNTER
ANTICOAGULATION FOLLOW-UP CLINIC VISIT    Patient Name:  Carlton Bravo  Date:  2020  Contact Type:  Telephone/ TANNER Mcgrath RN    SUBJECTIVE:  Patient Findings         Clinical Outcomes     Negatives:   Major bleeding event, Thromboembolic event, Anticoagulation-related hospital admission, Anticoagulation-related ED visit, Anticoagulation-related fatality           OBJECTIVE    INR   Date Value Ref Range Status   2020 2.6 (A) 0.90 - 1.10 Final       ASSESSMENT / PLAN      Anticoagulation Summary  As of 2020    INR goal:   2.0-3.0   TTR:   63.8 % (1 y)   INR used for dosin.6 (2020)   Warfarin maintenance plan:   3 mg (2 mg x 1.5) every Mon, Fri; 2 mg (2 mg x 1) all other days   Full warfarin instructions:   3 mg every Mon, Fri; 2 mg all other days   Weekly warfarin total:   16 mg   No change documented:   Wilda Metcalf RN   Plan last modified:   Wilda Metcalf RN (2020)   Next INR check:   3/16/2020   Priority:   Maintenance   Target end date:       Indications    Atrial fibrillation (HCC) [I48.91] [I48.91]  Long term current use of anticoagulant therapy [Z79.01]             Anticoagulation Episode Summary     INR check location:       Preferred lab:       Send INR reminders to:   MIGUEL ANGEL DALEY    Comments:   Take in AM. possible chromogenic 10/antiphosholipid syndrome      Anticoagulation Care Providers     Provider Role Specialty Phone number    Lona PettitNI gunn CNP Responsible Family Practice [phone]            See the Encounter Report to view Anticoagulation Flowsheet and Dosing Calendar (Go to Encounters tab in chart review, and find the Anticoagulation Therapy Visit)        Wilda Metcalf RN

## 2020-02-25 ENCOUNTER — PATIENT OUTREACH (OUTPATIENT)
Dept: GERIATRIC MEDICINE | Facility: CLINIC | Age: 80
End: 2020-02-25

## 2020-02-25 ENCOUNTER — DOCUMENTATION ONLY (OUTPATIENT)
Dept: CARE COORDINATION | Facility: CLINIC | Age: 80
End: 2020-02-25

## 2020-02-25 ENCOUNTER — OFFICE VISIT (OUTPATIENT)
Dept: PEDIATRICS | Facility: CLINIC | Age: 80
End: 2020-02-25
Payer: COMMERCIAL

## 2020-02-25 VITALS
DIASTOLIC BLOOD PRESSURE: 64 MMHG | HEART RATE: 74 BPM | OXYGEN SATURATION: 100 % | BODY MASS INDEX: 24.41 KG/M2 | TEMPERATURE: 96.3 F | WEIGHT: 185 LBS | SYSTOLIC BLOOD PRESSURE: 102 MMHG

## 2020-02-25 DIAGNOSIS — Z13.1 SCREENING FOR DIABETES MELLITUS (DM): ICD-10-CM

## 2020-02-25 DIAGNOSIS — Z00.00 ROUTINE GENERAL MEDICAL EXAMINATION AT A HEALTH CARE FACILITY: ICD-10-CM

## 2020-02-25 DIAGNOSIS — I42.9 IDIOPATHIC CARDIOMYOPATHY (H): ICD-10-CM

## 2020-02-25 DIAGNOSIS — Z00.00 ENCOUNTER FOR MEDICARE ANNUAL WELLNESS EXAM: Primary | ICD-10-CM

## 2020-02-25 DIAGNOSIS — Z79.01 LONG TERM CURRENT USE OF ANTICOAGULANT THERAPY: ICD-10-CM

## 2020-02-25 DIAGNOSIS — I10 HTN, GOAL BELOW 140/90: ICD-10-CM

## 2020-02-25 DIAGNOSIS — R26.89 POOR BALANCE: ICD-10-CM

## 2020-02-25 DIAGNOSIS — M17.0 PRIMARY OSTEOARTHRITIS OF BOTH KNEES: ICD-10-CM

## 2020-02-25 DIAGNOSIS — R73.09 ELEVATED GLUCOSE: ICD-10-CM

## 2020-02-25 DIAGNOSIS — F10.10 ALCOHOL ABUSE: ICD-10-CM

## 2020-02-25 DIAGNOSIS — Z13.29 SCREENING FOR THYROID DISORDER: ICD-10-CM

## 2020-02-25 DIAGNOSIS — E78.5 HYPERLIPIDEMIA LDL GOAL <100: ICD-10-CM

## 2020-02-25 DIAGNOSIS — I48.11 LONGSTANDING PERSISTENT ATRIAL FIBRILLATION (H): ICD-10-CM

## 2020-02-25 DIAGNOSIS — I48.91 ATRIAL FIBRILLATION, UNSPECIFIED TYPE (H): ICD-10-CM

## 2020-02-25 DIAGNOSIS — Z87.11 H/O GASTRIC ULCER: ICD-10-CM

## 2020-02-25 DIAGNOSIS — Z13.6 CARDIOVASCULAR SCREENING; LDL GOAL LESS THAN 130: ICD-10-CM

## 2020-02-25 DIAGNOSIS — R60.0 BILATERAL LEG EDEMA: ICD-10-CM

## 2020-02-25 LAB
ALBUMIN SERPL-MCNC: 2.7 G/DL (ref 3.4–5)
ALP SERPL-CCNC: 111 U/L (ref 40–150)
ALT SERPL W P-5'-P-CCNC: 25 U/L (ref 0–70)
ANION GAP SERPL CALCULATED.3IONS-SCNC: 10 MMOL/L (ref 3–14)
AST SERPL W P-5'-P-CCNC: 40 U/L (ref 0–45)
BILIRUB SERPL-MCNC: 2 MG/DL (ref 0.2–1.3)
BUN SERPL-MCNC: 8 MG/DL (ref 7–30)
CALCIUM SERPL-MCNC: 8.6 MG/DL (ref 8.5–10.1)
CHLORIDE SERPL-SCNC: 108 MMOL/L (ref 94–109)
CHOLEST SERPL-MCNC: 154 MG/DL
CO2 SERPL-SCNC: 24 MMOL/L (ref 20–32)
CREAT SERPL-MCNC: 0.56 MG/DL (ref 0.66–1.25)
ERYTHROCYTE [DISTWIDTH] IN BLOOD BY AUTOMATED COUNT: 19.8 % (ref 10–15)
GFR SERPL CREATININE-BSD FRML MDRD: >90 ML/MIN/{1.73_M2}
GLUCOSE SERPL-MCNC: 72 MG/DL (ref 70–99)
HBA1C MFR BLD: 4.5 % (ref 0–5.6)
HCT VFR BLD AUTO: 36.5 % (ref 40–53)
HDLC SERPL-MCNC: 81 MG/DL
HGB BLD-MCNC: 12.7 G/DL (ref 13.3–17.7)
LDLC SERPL CALC-MCNC: 59 MG/DL
MCH RBC QN AUTO: 36.5 PG (ref 26.5–33)
MCHC RBC AUTO-ENTMCNC: 34.8 G/DL (ref 31.5–36.5)
MCV RBC AUTO: 105 FL (ref 78–100)
NONHDLC SERPL-MCNC: 73 MG/DL
PLATELET # BLD AUTO: 171 10E9/L (ref 150–450)
POTASSIUM SERPL-SCNC: 4.7 MMOL/L (ref 3.4–5.3)
PROT SERPL-MCNC: 6.5 G/DL (ref 6.8–8.8)
RBC # BLD AUTO: 3.48 10E12/L (ref 4.4–5.9)
SODIUM SERPL-SCNC: 142 MMOL/L (ref 133–144)
TRIGL SERPL-MCNC: 70 MG/DL
TSH SERPL DL<=0.005 MIU/L-ACNC: 2.68 MU/L (ref 0.4–4)
WBC # BLD AUTO: 3.9 10E9/L (ref 4–11)

## 2020-02-25 PROCEDURE — 80053 COMPREHEN METABOLIC PANEL: CPT | Performed by: NURSE PRACTITIONER

## 2020-02-25 PROCEDURE — 80061 LIPID PANEL: CPT | Performed by: NURSE PRACTITIONER

## 2020-02-25 PROCEDURE — 83036 HEMOGLOBIN GLYCOSYLATED A1C: CPT | Performed by: NURSE PRACTITIONER

## 2020-02-25 PROCEDURE — 99387 INIT PM E/M NEW PAT 65+ YRS: CPT | Performed by: INTERNAL MEDICINE

## 2020-02-25 PROCEDURE — 85027 COMPLETE CBC AUTOMATED: CPT | Performed by: NURSE PRACTITIONER

## 2020-02-25 PROCEDURE — 84443 ASSAY THYROID STIM HORMONE: CPT | Performed by: NURSE PRACTITIONER

## 2020-02-25 PROCEDURE — 36415 COLL VENOUS BLD VENIPUNCTURE: CPT | Performed by: NURSE PRACTITIONER

## 2020-02-25 PROCEDURE — 99213 OFFICE O/P EST LOW 20 MIN: CPT | Mod: 25 | Performed by: INTERNAL MEDICINE

## 2020-02-25 RX ORDER — WARFARIN SODIUM 2 MG/1
2 TABLET ORAL DAILY
Qty: 100 TABLET | Refills: 1 | Status: SHIPPED | OUTPATIENT
Start: 2020-02-25 | End: 2020-06-26

## 2020-02-25 RX ORDER — PANTOPRAZOLE SODIUM 40 MG/1
40 TABLET, DELAYED RELEASE ORAL DAILY
Qty: 90 TABLET | Refills: 1 | Status: SHIPPED | OUTPATIENT
Start: 2020-02-25 | End: 2021-01-01

## 2020-02-25 RX ORDER — PRAVASTATIN SODIUM 80 MG/1
80 TABLET ORAL DAILY
Qty: 90 TABLET | Refills: 1 | Status: SHIPPED | OUTPATIENT
Start: 2020-02-25 | End: 2020-10-09

## 2020-02-25 RX ORDER — WARFARIN SODIUM 2 MG/1
2 TABLET ORAL DAILY
COMMUNITY
End: 2020-02-25

## 2020-02-25 RX ORDER — CARVEDILOL 6.25 MG/1
3.12 TABLET ORAL 2 TIMES DAILY WITH MEALS
Qty: 180 TABLET | Refills: 1 | Status: SHIPPED | OUTPATIENT
Start: 2020-02-25

## 2020-02-25 NOTE — PATIENT INSTRUCTIONS
Patient Education   Personalized Prevention Plan  You are due for the preventive services outlined below.  Your care team is available to assist you in scheduling these services.  If you have already completed any of these items, please share that information with your care team to update in your medical record.  Health Maintenance Due   Topic Date Due     Diptheria Tetanus Pertussis (DTAP/TDAP/TD) Vaccine (1 - Tdap) 05/31/1950     Zoster (Shingles) Vaccine (1 of 2) 05/31/1989     Annual Wellness Visit  10/20/2016     Flu Vaccine (1) 09/01/2019        Patient Education   Personalized Prevention Plan  You are due for the preventive services outlined below.  Your care team is available to assist you in scheduling these services.  If you have already completed any of these items, please share that information with your care team to update in your medical record.  Health Maintenance Due   Topic Date Due     Diptheria Tetanus Pertussis (DTAP/TDAP/TD) Vaccine (1 - Tdap) 05/31/1950     Zoster (Shingles) Vaccine (1 of 2) 05/31/1989     Annual Wellness Visit  10/20/2016     Flu Vaccine (1) 09/01/2019

## 2020-02-25 NOTE — PROGRESS NOTES
SUBJECTIVE:   Carlton Bravo is a 79 year old male who presents for Preventive Visit.    79-year-old gentleman comes in to establish care and for an annual physical examination.  The patient is homebound.  He uses a walker at home for short distances.  He came in a wheelchair.  He is unable to get onto my examination table.  States that he is feeling fine and on his own he cut back carvedilol 6.25 mg twice a day to once a day because his blood pressure was getting low.  Denies dizziness or lightheadedness.  He has chronic atrial fibrillation and is anticoagulated with Coumadin.  He has had falls at home.  There is a history is indicating alcohol abuse.  He tells me that he enjoys his alcohol and drinks at least 8 ounce of vodka a day.  He understands the that the combination of anticoagulation therapy with Coumadin and alcohol.  He is not willing to change.  Also informs me that he does not like to come for clinic visits and gradually comes once a year.  He has his pro times drawn at home.  He rarely steps out of the home.  He has his groceries delivered and he has a person that takes care of his finances.  Indicates he is able to manage at home.  His history is significant for having nonischemic idiopathic cardiomyopathy in 2007.  Alcohol could be a contributing factor.  He is EF at that time was 25% and his coronary arteries are normal.  Soon after he went into atrial fibrillation.  He was cardioverted once unsuccessfully.  He has remained anticoagulated with rate control.  He denies dizziness or lightheadedness.  He is EF 40 years have improved and his last echocardiogram few years ago had shown an EF of 50 to 55% with biatrial enlargement.  He refuses to go back to cardiology clinic.        Are you in the first 12 months of your Medicare Part B coverage?  No    Physical Health:    In general, how would you rate your overall physical health? poor    Outside of work, how many days during the week do you  "exercise? none    Outside of work, approximately how many minutes a day do you exercise?not applicable    If you drink alcohol do you typically have >3 drinks per day or >7 drinks per week? No    Do you usually eat at least 4 servings of fruit and vegetables a day, include whole grains & fiber and avoid regularly eating high fat or \"junk\" foods? NO    Do you have any problems taking medications regularly?  No    Do you have any side effects from medications? none    Needs assistance for the following daily activities: transportation    Which of the following safety concerns are present in your home?  none identified     Hearing impairment: No    In the past 6 months, have you been bothered by leaking of urine? Yes, using incontinence supplies    Mental Health:    In general, how would you rate your overall mental or emotional health? excellent  PHQ-2 Score:      Do you feel safe in your environment? Yes    Have you ever done Advance Care Planning? (For example, a Health Directive, POLST, or a discussion with a medical provider or your loved ones about your wishes): Yes, advance care planning is on file.    Additional concerns to address?      Fall risk: Not completed due to medical reasons    Cognitive Screenin) Repeat 3 items (Leader, Season, Table)    2) Clock draw: NORMAL  3) 3 item recall: Recalls 1 object   Results: NORMAL clock, 1-2 items recalled: COGNITIVE IMPAIRMENT LESS LIKELY    Mini-CogTM Copyright MARIBEL Morelos. Licensed by the author for use in [facility]; reprinted with permission ([email or web address]). All rights reserved.      Do you have sleep apnea, excessive snoring or daytime drowsiness?: no      Reviewed and updated as needed this visit by clinical staff  Tobacco  Allergies  Meds  Problems  Med Hx  Surg Hx  Fam Hx  Soc Hx          Reviewed and updated as needed this visit by Provider  Tobacco  Allergies  Meds  Problems  Med Hx  Surg Hx  Fam Hx        Social History "     Tobacco Use     Smoking status: Never Smoker     Smokeless tobacco: Never Used   Substance Use Topics     Alcohol use: Yes     Alcohol/week: 0.0 standard drinks     Comment: was drinking about 5 drinks a night but quit 7/2015 due to GI bleed.                            Current providers sharing in care for this patient include:   Patient Care Team:  Lona Pettit APRN CNP as PCP - General (Family Practice)  Sergey Ross MD as Assigned PCP  Merari Reyna RN as Lead Care Coordinator (Primary Care - CC)    The following health maintenance items are reviewed in Epic and correct as of today:  Health Maintenance   Topic Date Due     DTAP/TDAP/TD IMMUNIZATION (1 - Tdap) 05/31/1950     ZOSTER IMMUNIZATION (1 of 2) 05/31/1989     MEDICARE ANNUAL WELLNESS VISIT  10/20/2016     INFLUENZA VACCINE (1) 09/01/2019     ADVANCE CARE PLANNING  10/20/2020     FALL RISK ASSESSMENT  02/10/2021     PHQ-2  Completed     PNEUMOCOCCAL IMMUNIZATION 65+ LOW/MEDIUM RISK  Completed     IPV IMMUNIZATION  Aged Out     MENINGITIS IMMUNIZATION  Aged Out     BP Readings from Last 3 Encounters:   02/25/20 102/64   02/28/19 117/73   02/14/19 146/70    Wt Readings from Last 3 Encounters:   02/25/20 83.9 kg (185 lb)   02/14/19 94.7 kg (208 lb 12.8 oz)   01/02/19 (P) 93.7 kg (206 lb 9.6 oz)                  Patient Active Problem List   Diagnosis     HTN, goal below 140/90     Idiopathic cardiomyopathy (H)     Hemorrhage of gastrointestinal tract     Long term current use of anticoagulant therapy     Acute posthemorrhagic anemia     Alcohol abuse     History of colonic polyps     Benign prostatic hyperplasia with urinary obstruction     Microalbuminuria     H/O gastric ulcer     Health Care Home     Primary osteoarthritis of both knees     Bilateral leg edema     Longstanding persistent atrial fibrillation     Poor balance     Hyperlipidemia LDL goal <100     Past Surgical History:   Procedure Laterality Date     CATARACT IOL,  RT/LT Left 02/14/2019     CATARACT IOL, RT/LT Right 02/28/2019     KNEE SURGERY  1990    bilateral arthroscopic     PHACOEMULSIFICATION WITH STANDARD INTRAOCULAR LENS IMPLANT Left 2/14/2019    Procedure: LEFT PHACOEMULSIFICATION WITH STANDARD INTRAOCULAR LENS IMPLANT;  Surgeon: Rojas Brown MD;  Location: [facility] OR     PHACOEMULSIFICATION WITH STANDARD INTRAOCULAR LENS IMPLANT Right 2/28/2019    Procedure: RIGHT PHACOEMULSIFICATION WITH STANDARD INTRAOCULAR LENS IMPLANT;  Surgeon: Rojas Brown MD;  Location: [facility] OR       Social History     Tobacco Use     Smoking status: Never Smoker     Smokeless tobacco: Never Used   Substance Use Topics     Alcohol use: Yes     Alcohol/week: 0.0 standard drinks     Comment: was drinking about 5 drinks a night but quit 7/2015 due to GI bleed.      Family History   Problem Relation Age of Onset     Myocardial Infarction Other      Cerebrovascular Disease Father      Hypertension No family hx of      Diabetes No family hx of      Colon Cancer No family hx of      Prostate Cancer No family hx of          Current Outpatient Medications   Medication Sig Dispense Refill     carvedilol (COREG) 6.25 MG tablet Take 0.5 tablets (3.125 mg) by mouth 2 times daily (with meals) Patient is taking 6.25 mg once daily states he changed the dosing himself 180 tablet 1     pantoprazole (PROTONIX) 40 MG EC tablet Take 1 tablet (40 mg) by mouth daily 90 tablet 1     pravastatin (PRAVACHOL) 80 MG tablet Take 1 tablet (80 mg) by mouth daily 90 tablet 1     warfarin ANTICOAGULANT (COUMADIN) 2 MG tablet Take 1 tablet (2 mg) by mouth daily Patient is taking 3 mg Monday and Friday, 2 mg all other days 100 tablet 1     No Known Allergies      ROS:  Constitutional, HEENT, cardiovascular, pulmonary, GI, , musculoskeletal, neuro, skin, endocrine and psych systems are negative, except as otherwise noted.    OBJECTIVE:   /64 (BP Location: Right arm, Patient Position: Sitting, Cuff Size:  "Adult Large)   Pulse 74   Temp 96.3  F (35.7  C) (Temporal)   Wt 83.9 kg (185 lb)   SpO2 100%   BMI 24.41 kg/m   Estimated body mass index is 24.41 kg/m  as calculated from the following:    Height as of 2/14/19: 1.854 m (6' 1\").    Weight as of this encounter: 83.9 kg (185 lb).  EXAM:   GENERAL: healthy, alert and no distress  EYES: Eyes grossly normal to inspection, PERRL and conjunctivae and sclerae normal  HENT: ear canals and TM's normal, nose and mouth without ulcers or lesions  NECK: no adenopathy, no asymmetry, masses, or scars and thyroid normal to palpation  RESP: lungs clear to auscultation - no rales, rhonchi or wheezes  CV: irregularly irregular rhythm, normal S1 S2, no S3 or S4, no murmur, click or rub and 2+ bilateral lower extremity pitting edema to knee    ABDOMEN: soft, nontender, no hepatosplenomegaly, no masses and bowel sounds normal  MS: hypertrophy of both knees  SKIN: no suspicious lesions or rashes  NEURO: Normal strength and tone, mentation intact and speech normal  PSYCH: mentation appears normal, affect normal/bright  LYMPH: no cervical, supraclavicular, axillary, or inguinal adenopathy    Diagnostic Test Results:  Labs reviewed in Epic  Results for orders placed or performed in visit on 02/25/20 (from the past 24 hour(s))   Comprehensive metabolic panel   Result Value Ref Range    Sodium 142 133 - 144 mmol/L    Potassium 4.7 3.4 - 5.3 mmol/L    Chloride 108 94 - 109 mmol/L    Carbon Dioxide 24 20 - 32 mmol/L    Anion Gap 10 3 - 14 mmol/L    Glucose 72 70 - 99 mg/dL    Urea Nitrogen 8 7 - 30 mg/dL    Creatinine 0.56 (L) 0.66 - 1.25 mg/dL    GFR Estimate >90 >60 mL/min/[1.73_m2]    GFR Estimate If Black >90 >60 mL/min/[1.73_m2]    Calcium 8.6 8.5 - 10.1 mg/dL    Bilirubin Total 2.0 (H) 0.2 - 1.3 mg/dL    Albumin 2.7 (L) 3.4 - 5.0 g/dL    Protein Total 6.5 (L) 6.8 - 8.8 g/dL    Alkaline Phosphatase 111 40 - 150 U/L    ALT 25 0 - 70 U/L    AST 40 0 - 45 U/L   TSH with free T4 reflex "   Result Value Ref Range    TSH 2.68 0.40 - 4.00 mU/L   CBC with platelets   Result Value Ref Range    WBC 3.9 (L) 4.0 - 11.0 10e9/L    RBC Count 3.48 (L) 4.4 - 5.9 10e12/L    Hemoglobin 12.7 (L) 13.3 - 17.7 g/dL    Hematocrit 36.5 (L) 40.0 - 53.0 %     (H) 78 - 100 fl    MCH 36.5 (H) 26.5 - 33.0 pg    MCHC 34.8 31.5 - 36.5 g/dL    RDW 19.8 (H) 10.0 - 15.0 %    Platelet Count 171 150 - 450 10e9/L   Lipid panel reflex to direct LDL Fasting   Result Value Ref Range    Cholesterol 154 <200 mg/dL    Triglycerides 70 <150 mg/dL    HDL Cholesterol 81 >39 mg/dL    LDL Cholesterol Calculated 59 <100 mg/dL    Non HDL Cholesterol 73 <130 mg/dL   Hemoglobin A1c   Result Value Ref Range    Hemoglobin A1C 4.5 0 - 5.6 %       ASSESSMENT / PLAN:     1.  Encounter for Medicare wellness exam.  2.  Idiopathic cardiomyopathy diagnosed 2007.  Nonischemic with EF of 25% which improved later to 50 to 55%.  Currently on carvedilol but not ACE/ARB.  3.  With a good ventricular rate control on anticoagulant with Coumadin.  longstanding persistent atrial fibrillation INRs followed by clinic.  4.  Alcohol abuse of longstanding.  Patient has no desire to quit.  5.  Essential hypertension at goal.  6.  Long term current use of anticoagulation therapy for atrial fibrillation.  Followed by INR clinic.  7.  Bilateral leg edema could be secondary to dependency and venous incompetence, low albumin and protein.  8.  Primary osteoarthritis of both knees.  Patient's current ambulation is very restricted.  9.  Poor balance.  It is possible that he may have cerebellar ataxia due to his alcohol use.  10.  Low protein both the total protein and albumin.  The low albumin could suggest underlying liver disease due to alcohol use.  Did discuss that with the patient.  11.  Previous history of bleeding gastric ulcer diagnosed 2015.  Been on PPI ever since.  Because of his alcohol history he should continue with PPI.  12.  Hyperlipidemia at goal today  "with pravastatin as mentioned above.    You continue INR checkups.  Recommend that he switch carvedilol to 3.125 mg twice a day instead of taking 6.25 mg once a day.  Return for follow-up in 6 months with CMP and a CBC.      COUNSELING:  Reviewed preventive health counseling, as reflected in patient instructions       Regular exercise       Healthy diet/nutrition    Estimated body mass index is 24.41 kg/m  as calculated from the following:    Height as of 2/14/19: 1.854 m (6' 1\").    Weight as of this encounter: 83.9 kg (185 lb).         reports that he has never smoked. He has never used smokeless tobacco.      Appropriate preventive services were discussed with this patient, including applicable screening as appropriate for cardiovascular disease, diabetes, osteopenia/osteoporosis, and glaucoma.  As appropriate for age/gender, discussed screening for colorectal cancer, prostate cancer, breast cancer, and cervical cancer. Checklist reviewing preventive services available has been given to the patient.    Reviewed patients plan of care and provided an AVS. The Basic Care Plan (routine screening as documented in Health Maintenance) for Carlton meets the Care Plan requirement. This Care Plan has been established and reviewed with the Patient.    Counseling Resources:  ATP IV Guidelines  Pooled Cohorts Equation Calculator  Breast Cancer Risk Calculator  FRAX Risk Assessment  ICSI Preventive Guidelines  Dietary Guidelines for Americans, 2010  USDA's MyPlate  ASA Prophylaxis  Lung CA Screening    Syed Santana MD  [facility]  "

## 2020-02-25 NOTE — PROGRESS NOTES
Care Coordinator received call from member stating that everything went relatively smooth with his appt and ride today - he was very impressed with [employer] and with his   - everything went great.  He states that he felt comfortable and enjoyed Dr. Santana.  He does need to return in 6 mos which he hoped it was 1 year but he is ok with this.    He is hoping to use [employer] again and same .     Mreari Reyna RN, N  [facility]

## 2020-02-25 NOTE — PROGRESS NOTES
[facility] utilizes an encounter to take the place of a direct phone call to your office. Please take a moment to review the below request. Please reply or route message to author of this encounter.  Message will act as a verbal OK of orders requested below. Thank you.    ORDER  [id number], 4 prn for INR draws, chronic care mgmt    Ramila Valverde RN,   [facility]  [phone]  [email or web address]    Agree with the above orders and care plan.  Syed Santana

## 2020-02-28 ENCOUNTER — PATIENT OUTREACH (OUTPATIENT)
Dept: GERIATRIC MEDICINE | Facility: CLINIC | Age: 80
End: 2020-02-28

## 2020-02-28 DIAGNOSIS — Z76.89 HEALTH CARE HOME: ICD-10-CM

## 2020-02-28 NOTE — PROGRESS NOTES
Member has used samples from [facility] of pull ups - he states he would like to go with large - he states that he uses overnight and wondering if there is a heavier absorbency.   Care Coordinator spoke with Pearl at [facility] - there are overnights  - she will change rx to Large overnights and resend to PCP.  Care Coordinator relayed this to member.     Merari Reyna RN, PHN  [facility]

## 2020-03-16 ENCOUNTER — TELEPHONE (OUTPATIENT)
Dept: PEDIATRICS | Facility: CLINIC | Age: 80
End: 2020-03-16

## 2020-03-16 NOTE — TELEPHONE ENCOUNTER
Ramila [facility] called to report that she needs to move patient to Wednesday 3/18/20 instead of Monday 3/16/20 this week, Patient last INR was therapeutic.  VO given to test on Wednesday.    Carol Banuelos RN  Anticoagulation Clinic

## 2020-03-17 NOTE — PROGRESS NOTES
Care Coordinator received call from member that he has decided he does not want the pull ups - he has decided he is going to use a urinal at night so that he doesn't have to get up t/o the night to the bathroom.   Care Coordinator canceled order with Jen at [facility].

## 2020-03-18 ENCOUNTER — TELEPHONE (OUTPATIENT)
Dept: PEDIATRICS | Facility: CLINIC | Age: 80
End: 2020-03-18

## 2020-03-18 ENCOUNTER — TELEPHONE (OUTPATIENT)
Dept: CARE COORDINATION | Facility: CLINIC | Age: 80
End: 2020-03-18

## 2020-03-18 DIAGNOSIS — Z53.9 DIAGNOSIS NOT YET DEFINED: Primary | ICD-10-CM

## 2020-03-18 DIAGNOSIS — Z79.01 LONG TERM CURRENT USE OF ANTICOAGULANT THERAPY: ICD-10-CM

## 2020-03-18 LAB — INR PPP: 1.7 (ref 0.9–1.1)

## 2020-03-18 PROCEDURE — G0179 MD RECERTIFICATION HHA PT: HCPCS | Performed by: INTERNAL MEDICINE

## 2020-03-18 NOTE — TELEPHONE ENCOUNTER
[facility] utilizes an encounter to take the place of a direct phone call to your office. Please take a moment to review the below request. Please reply or route message to author of this encounter.  Message will act as a verbal OK of orders requested below. Thank you.    ORDER  SN visits 2xmox1 mo, [id number], 3 as needed    For ongoing INR rechecks and to monitor chronic conditions.     Ramila Valverde RN,   [facility]  [phone]  [email or web address]

## 2020-03-18 NOTE — TELEPHONE ENCOUNTER
[facility] utilizes an encounter to take the place of a direct phone call to your office. Please take a moment to review the below request. Please reply or route message to author of this encounter.  Message will act as a verbal OK of orders requested below. Thank you.    ORDER    MD SUMMARY/PLAN OF CARE  SUMMARY TO MD  S/ 79 yr old Male being recertified for home care services for ongoing coordination of care and INR/labs. Over past 60 days, patient has had 1 fall without injury. He is using walker at all times. Pt reports it being very taxing to leave home and difficult to get to clinic for INR checks. 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 Denies any issues with anxiety or depression. IADL assistance is provided by delivery systems such as [employer] and will have [facility] deliver medications. Pt has close friend that assists with errand running/ MD appointments. Medication reconciliation was completed patient is knowledgeable of medication dosing, frequency, route and purpose of all meds.  PT has not had any other issues with BP past 2 months. Pt has increased fluids, but is unable to get to 64 ounce/day. Reports he is still working on this.   B/ Lives alone in [facility] with a fair support system in place.  history of GI bleed, falls, traumatic wounds, idiopathic cardiomyopathy, alcoholism, HTN                   A/ Pt is at risk for hospitalization r/t Chronic disease mgmt, hx of falls/fall risk, ETOH consumption on a daily basis and  dependent on others for IADL needs. Pt needs nursing mainly for INR draws/Coumadin management.   R/ SN visits [id number], 5 prn visits    Ramila Valverde RN,   [facility]  [phone]  [email or web address]    Agree with the above orders and care coordination.  Syed Santana MD

## 2020-03-18 NOTE — TELEPHONE ENCOUNTER
ANTICOAGULATION MANAGEMENT     Patient Name:  Carlton Bravo  Date:  3/18/2020    ASSESSMENT /SUBJECTIVE:      Today's INR result of 1.7 is subtherapeutic. Goal INR of 2.0-3.0      Warfarin dose taken: Warfarin taken as previously instructed    Diet: Increased greens/vitamin K intake may be affecting INR    Medication changes/ interactions: No new medications/supplements affecting INR    Previous INR: Therapeutic     S/S of bleeding or thromboembolism: No    New injury or illness:  No    Upcoming surgery, procedure or cardioversion:  No    Additional findings: Patient increased greens, has been stable for quite awhile due to this out 4 weeks for recheck      PLAN:    Spoke with Marcella Mcgrath regarding INR result and instructed:     Warfarin Dosing Instructions: 4 mg  then continue your current warfarin dose of 3 mg on mon/fri and 2 mg all other days    Instructed patient to follow up no later than: 4 weeks  Orders given to  Homecare nurse/facility to recheck    Education provided: Yes: reasons to check sooner      Tavares Mcgrath care verbalizes understanding and agrees to warfarin dosing plan.    Instructed to call the Anticoagulation Clinic for any changes, questions or concerns. (#[phone])        OBJECTIVE:  INR   Date Value Ref Range Status   2020 1.7 (A) 0.90 - 1.10 Final             Anticoagulation Summary  As of 3/18/2020    INR goal:   2.0-3.0   TTR:   61.7 % (1 y)   INR used for dosin.7! (3/18/2020)   Warfarin maintenance plan:   3 mg (2 mg x 1.5) every Mon, Fri; 2 mg (2 mg x 1) all other days   Full warfarin instructions:   3 mg every Mon, Fri; 2 mg all other days   Weekly warfarin total:   16 mg   Plan last modified:   Wilda Metcalf, RN (2020)   Next INR check:      Priority:   Maintenance   Target end date:       Indications    Atrial fibrillation (HCC) [I48.91] (Resolved) [I48.91]  Long term current use of anticoagulant therapy [Z79.01]             Anticoagulation Episode  Summary     INR check location:   Home Draw    Preferred lab:       Send INR reminders to:   MIGUEL ANGEL HERRERA    Comments:   Possible chromogenic 10/antiphosholipid syndrome  FVKELLY-Ramila, [phone]      Anticoagulation Care Providers     Provider Role Specialty Phone number    Lona Pettit APRN [facility] [phone]

## 2020-03-20 DIAGNOSIS — Z79.01 LONG TERM CURRENT USE OF ANTICOAGULANT THERAPY: ICD-10-CM

## 2020-03-20 DIAGNOSIS — I48.11 LONGSTANDING PERSISTENT ATRIAL FIBRILLATION (H): Primary | ICD-10-CM

## 2020-04-07 DIAGNOSIS — Z53.9 DIAGNOSIS NOT YET DEFINED: Primary | ICD-10-CM

## 2020-04-07 PROCEDURE — G0179 MD RECERTIFICATION HHA PT: HCPCS | Performed by: INTERNAL MEDICINE

## 2020-04-13 ENCOUNTER — TELEPHONE (OUTPATIENT)
Dept: PEDIATRICS | Facility: CLINIC | Age: 80
End: 2020-04-13

## 2020-04-13 DIAGNOSIS — Z79.01 LONG TERM CURRENT USE OF ANTICOAGULANT THERAPY: ICD-10-CM

## 2020-04-13 NOTE — TELEPHONE ENCOUNTER
Lurdes home care nurse calling to get coumadin dosing for this evening.  INR will be checked tomorrow.  Lliiana Flynn RN  Anticoagulation Nurse - [facility], [place]

## 2020-04-14 ENCOUNTER — NURSE TRIAGE (OUTPATIENT)
Dept: NURSING | Facility: CLINIC | Age: 80
End: 2020-04-14

## 2020-04-14 ENCOUNTER — TELEPHONE (OUTPATIENT)
Dept: PEDIATRICS | Facility: CLINIC | Age: 80
End: 2020-04-14

## 2020-04-14 DIAGNOSIS — Z79.01 LONG TERM CURRENT USE OF ANTICOAGULANT THERAPY: ICD-10-CM

## 2020-04-14 LAB — INR PPP: 2.5 (ref 0.9–1.1)

## 2020-04-14 NOTE — TELEPHONE ENCOUNTER
home care nurse called in.  The caller states Pt had fall yesterday.  Pt seen by paramedics.  Pt didn't go to ED.  The is skin tear on his left elbow left shine.  No loss of conciseness.  No head injury.  Had little bleeding.  No pain  The bleeding stop.  No other injury.  Caller states she see the Pt today.  Pt decline other medical care related to this fall.  Pt is in stable condition at this time.  No other concern at this time.      Richi Garcia Nurse Advisor 4/14/2020 6:35 PM

## 2020-04-14 NOTE — TELEPHONE ENCOUNTER
ANTICOAGULATION MANAGEMENT     Patient Name:  Carlton Bravo  Date:  2020    ASSESSMENT /SUBJECTIVE:    Today's INR result of 2.5 is therapeutic. Goal INR of 2.0-3.0      Warfarin dose taken: Warfarin taken as previously instructed    Diet: No new diet changes affecting INR    Medication changes/ interactions: No new medications/supplements affecting INR    Previous INR: Therapeutic     S/S of bleeding or thromboembolism: No    New injury or illness: No    Upcoming surgery, procedure or cardioversion: No    Additional findings: None      PLAN:    Spoke with home care nurse Jacqueline regarding INR result and instructed:     Warfarin Dosing Instructions: Continue your current warfarin dose 3mg mon/fri and 2mg rest of the week    Instructed patient to follow up no later than: 3 weeks  Orders given to  Homecare nurse/facility to recheck    Education provided: Target INR goal and significance of current INR result      Jacqueline verbalizes understanding and agrees to warfarin dosing plan.    Instructed to call the Anticoagulation Clinic for any changes, questions or concerns. (#[phone])        OBJECTIVE:  INR   Date Value Ref Range Status   2020 2.5 (A) 0.90 - 1.10 Final             Anticoagulation Summary  As of 2020    INR goal:   2.0-3.0   TTR:   59.0 % (1 y)   INR used for dosin.5 (2020)   Warfarin maintenance plan:   3 mg (2 mg x 1.5) every Mon, Fri; 2 mg (2 mg x 1) all other days   Full warfarin instructions:   3 mg every Mon, Fri; 2 mg all other days   Weekly warfarin total:   16 mg   Plan last modified:   Wilda Metcalf RN (2020)   Next INR check:   2020   Priority:   Maintenance   Target end date:       Indications    Atrial fibrillation (HCC) [I48.91] (Resolved) [I48.91]  Long term current use of anticoagulant therapy [Z79.01]             Anticoagulation Episode Summary     INR check location:   Home Draw    Preferred lab:       Send INR reminders to:   MIGUEL ANGEL HERRERA     Comments:   Possible chromogenic 10/antiphosholipid syndrome  Nomi -[phone]      Anticoagulation Care Providers     Provider Role Specialty Phone number    Lona Pettit APRN [facility] [phone]

## 2020-04-27 ENCOUNTER — PATIENT OUTREACH (OUTPATIENT)
Dept: GERIATRIC MEDICINE | Facility: CLINIC | Age: 80
End: 2020-04-27

## 2020-04-27 NOTE — PROGRESS NOTES
TRANSITIONS OF CARE (LUCERO) LOG   LUCERO tasks should be completed by the CC within one (1) business day of notification of each transition. Follow up contact with member is required after return to their usual care setting.  Note:  If CC finds out about the transitions fifteen (15) days or more after the member has returned to their usual care setting, no LUCERO log is needed. However, the CC should check in with the member to discuss the transition process, any changes needed to the care plan and document it in a case note.    Member Name:  Carlton Bravo [facility] Member ID#: [id number]   Product: [facility] Care Coordinator Contact:  Merari Reyna RN, PHN Agency/County/Care System: [facility]   Transition Communication Actions from Care Management Contact   Transition #1   Notification Date: 4/27/20 Transition Date:   4/24/20 Transition From: Home     Is this the member s usual care setting?               yes Transition To: Hospital, [facility]   Transition Type:  Unplanned  Reason for Admission/Comments:  4/27/20: Care Coordinator received notification that member was admitted on 4/24 for fall and subdural hematoma.  Member had surgery on 4/26 - Right-sided tana holes for drainage of chronic right subdural hematoma.   Care Coordinator placed call to [facility] for SW requesting call back.    4/29/20: Care Coordinator received call from KACI Flowers Care Coordinator at [phone].   Care Coordinator shared services with her.  She states TCU is recommended however member declined and wants to return home.   She states discharge is 1-3 days out.  She will make referral to [facility] for Rn, PT/OT.   Care Coordinator spoke with member  - he was concerned about transport home after discharge - discussed that insurance will cover transport home.  He asked Care Coordinator to notify Kristie re: this.  Care Coordinator called Kristie - she is aware.   Care Coordinator also left  vm for ANTONIO Mcgrath with [facility] to update her.  4/30/20: Care Coordinator received call from ANTONIO Flowers Care Coordinator at [facility].  Member is discharge tomorrow at 10 a.m. with [facility] w/ transport to home with referral to [facility] for SNV, PT/OT.  She states that in discharge orders it does state that sutures need to be removed on 5/8 by TCU. Since member is not going to TCU can be removed by HC RN. If any issues info to [facility] is on orders.  Care Coordinator left vm for Antonio Mcgrath informing her of this.       Shared CC contact info, care plan/services with receiving setting--Date completed: 4/24/20    Notified PCP of transition--Date completed:  4/24/20     via  EMR   Transition #2   Transition #3  (if applicable)   Notification Date: 5/1/20         Transition To:  Home  Transition Date: 5/1/20     Transition Type:    Planned  Notified PCP -- Date completed: 5/1/20              Shared CC contact info, care plan/services with receiving setting or, if applicable, home care agency--Date completed:  5/1/20  *Complete additional tasks below, if this transition is a return to usual care setting.      Comments:  Member was discharge to home on 5/1/20  - member was unable to tx from w/c to [place] and therefore was transferred back to [facility] ED and then transferred to TCU.     Notification Date:  5/4/20        Transition To:  Skilled Nursing Facility, [facility]  Transition Date:   5/1/20           Transition Type:    Planned  Notified PCP--Date completed: 5/4/20         Shared CC contact info, care plan/services with receiving setting or, if applicable, home care agency--Date completed: 5/4/20      *Complete additional tasks below, if this transition is a return to usual care setting.      Comments:  Care Coordinator spoke with member on 5/4/20 - he states he is managing well - just very weak. Care Coordinator left  for EL Li at [phone].      *Complete tasks below when the member  is discharging TO their usual care setting within one (1) business day of notification.  For situations where the Care Coordinator is notified of the discharge prior to the date of discharge, the Care Coordinator must follow up with the member or designated representative to confirm that discharge actually occurred and discuss required LUCERO tasks as outlined in the LUCERO Instructions.  (This includes situations where it may be a  new  usual care setting for the member. (i.e., a community member who decides upon permanent nursing home placement following hospitalization and rehab).    Date completed:   Communicated with member or their designated representative about the following:  care transition process; about changes to the member s health status; plan of care updates; education about transitions and how to prevent unplanned transitions/readmissions  Four Pillars for Optimal Transition:    Check  Yes  - if the member, family member and/or SNF/facility staff manages the following:    If  No  provide explanation in the comments section.          []  Yes     []  No     Does the member have a follow-up appointment scheduled with primary care or specialist? (Mental health hospitalizations--the appt. should be w/in 7 days)   []  Yes     []  No     Can the member manage their medications or is there a system in place to manage medications (e.g. home care set-up)?         []  Yes     []  No     Can the member verbalize warning signs and symptoms to watch for and how to respond?         []  Yes     []  No     Does the member use a Personal Health Care Record?  Check  Yes  if visit summary, discharge summary, and/or healthcare summary are being used as a PHR.                                                                                                                                                                                    [] Yes      [] No      Have you updated the member s care plan?  If  No  provide explanation  in comments.   Comments:

## 2020-05-04 NOTE — PROGRESS NOTES
5/4/20: Care Coordinator received call from member stating that he remember from discharge orders that there was a certain medication that he needed to  at pharmacy that couldn't be delivered.  He cannot recall name.  Care Coordinator explained that TCU would have received discharge orders and he is most likely taking the medications but Care Coordinator will call nurses station and verify.  He will also talk to nurse on floor.   Care Coordinator spoke with KACI Leo.  She verified that they did receive discharge orders and most likely is taking it but will f/u and confirm with the discharge orders and with member.     Merari Reyna RN, PHN  [facility]

## 2020-05-08 DIAGNOSIS — Z53.9 DIAGNOSIS NOT YET DEFINED: Primary | ICD-10-CM

## 2020-05-08 PROCEDURE — G0179 MD RECERTIFICATION HHA PT: HCPCS | Performed by: INTERNAL MEDICINE

## 2020-05-15 NOTE — PROGRESS NOTES
5/14/20: Care Coordinator received call from member stating that he is discharge home on Tues 5/19.  He is in need of a toilet seat riser w/ arms and also needs w/c transport home.   He met with EL today who should be calling Care Coordinator.  Care Coordinator called EL Li, and left kalyn requesting call rick and request for rx for toilet seat riser.  Care Coordinator also called nurses station re: rx.  They stated Jen would need to handle.     Merari Reyna RN, PHN  [facility]

## 2020-05-18 NOTE — PROGRESS NOTES
5/18/20: Care Coordinator received rx for toilet seat riser from EL Li, at [facility].   Care Coordinator sent to Jen Perez at [facility].     Merari Reyna RN, N  [facility]

## 2020-05-19 ENCOUNTER — PATIENT OUTREACH (OUTPATIENT)
Dept: GERIATRIC MEDICINE | Facility: CLINIC | Age: 80
End: 2020-05-19

## 2020-05-19 NOTE — PROGRESS NOTES
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   He plans to schedule f/u appt with you.     Merari Reyna RN, N  [facility]

## 2020-05-19 NOTE — PROGRESS NOTES
5/19/20: Care Coordinator received confirmation from EL Li, at [facility] that member was discharge home via w/c van transport.     Care Coordinator spoke with member - he states that he is home safely and doing very well.   He states that he has an appt on Mariana 3 for neurosurgeon he believes - he is unsure of time and location.  He will figure this out and then notify Care Coordinator for ride.  He also states he needs to see PCP and will have Kaci Mcgrath assist him with scheduling.   Discussed toilet seat riser - he is aware to make sure fits toilet prior to use so it can be exchanged if needed.     Care Coordinator spoke with Jen Perez @ [facility] - she will attempt to have delivered tomorrow.     Care Coordinator sent email to KACI Mcgrath at [facility] - notified her of discharge and she will assist Care Coordinator in obtaining info re: Mariana 3 appt.     Member states he has no other needs or services needed at this time.     Merari Reyna RN, PHN  [employer]

## 2020-05-19 NOTE — PROGRESS NOTES
TRANSITIONS OF CARE (LUCERO) LOG   LUCERO tasks should be completed by the CC within one (1) business day of notification of each transition. Follow up contact with member is required after return to their usual care setting.  Note:  If CC finds out about the transitions fifteen (15) days or more after the member has returned to their usual care setting, no LUCERO log is needed. However, the CC should check in with the member to discuss the transition process, any changes needed to the care plan and document it in a case note.    Member Name:  Carlton Bravo [facility] Member [facility]#:    Product: [facility] Care Coordinator Contact:  Merari Reyna RN, N Agency/County/Care System: [facility]   Transition Communication Actions from Care Management Contact   Transition #1   Notification Date: 5/19/20 Transition Date:   5/19/20 [facility]     Is this the member s usual care setting?               no Transition To: Home   Transition Type:  Planned  Reason for Admission/Comments:  See epic notes from 5/19/20 - member discharge to home.      Shared CC contact info, care plan/services with receiving setting--Date completed: 5/19/20    Notified PCP of transition--Date completed:  5/19/20     via  EMR   Transition #2   Transition #3  (if applicable)   Notification Date:          Transition To:     Transition Date:      Transition Type:    Planned  Notified PCP -- Date completed:               Shared CC contact info, care plan/services with receiving setting or, if applicable, home care agency--Date completed:    *Complete additional tasks below, if this transition is a return to usual care setting.      Comments:      Notification Date:          Transition To:     Transition Date:              Transition Type:    Planned  Notified PCP--Date completed:          Shared CC contact info, care plan/services with receiving setting or, if applicable, home  care agency--Date completed:       *Complete additional tasks below, if this transition is a return to usual care setting.      Comments:       *Complete tasks below when the member is discharging TO their usual care setting within one (1) business day of notification.  For situations where the Care Coordinator is notified of the discharge prior to the date of discharge, the Care Coordinator must follow up with the member or designated representative to confirm that discharge actually occurred and discuss required LUCERO tasks as outlined in the LUCERO Instructions.  (This includes situations where it may be a  new  usual care setting for the member. (i.e., a community member who decides upon permanent nursing home placement following hospitalization and rehab).  Date completed:   Communicated with member or their designated representative about the following:  care transition process; about changes to the member s health status; plan of care updates; education about transitions and how to prevent unplanned transitions/readmissions  Four Pillars for Optimal Transition:    Check  Yes  - if the member, family member and/or SNF/facility staff manages the following:    If  No  provide explanation in the comments section.          [x]  Yes     []  No     Does the member have a follow-up appointment scheduled with primary care or specialist? (Mental health hospitalizations--the appt. should be w/in 7 days)   [x]  Yes     []  No     Can the member manage their medications or is there a system in place to manage medications (e.g. home care set-up)?         [x]  Yes     []  No     Can the member verbalize warning signs and symptoms to watch for and how to respond?         []  Yes     []  No     Does the member use a Personal Health Care Record?  Check  Yes  if visit summary, discharge summary, and/or healthcare summary are being used as a PHR.                                                                                                                                                                                     [x] Yes      [] No      Have you updated the member s care plan?  If  No  provide explanation in comments.   Comments:

## 2020-05-20 NOTE — PROGRESS NOTES
5/20/20: Care Coordinator received call from member - he states that he received call from PCC and has appt with PCP scheduled and would like ride.  Care Coordinator forwarded info to CMS to schedule.      Care Coordinator also reviewed discharge notes in care everywhere - called [facility] and confirmed June 3rd appt.  Also, sent to CMS to schedule STS ride.      Care Coordinator reached out to Jen at [facility]  - toilet seat riser shipped out today.      Merari Reyna RN, PHN  [facility]

## 2020-05-21 ENCOUNTER — PATIENT OUTREACH (OUTPATIENT)
Dept: GERIATRIC MEDICINE | Facility: CLINIC | Age: 80
End: 2020-05-21

## 2020-05-21 ENCOUNTER — DOCUMENTATION ONLY (OUTPATIENT)
Dept: CARE COORDINATION | Facility: CLINIC | Age: 80
End: 2020-05-21

## 2020-05-21 ENCOUNTER — PATIENT OUTREACH (OUTPATIENT)
Dept: CARE COORDINATION | Facility: CLINIC | Age: 80
End: 2020-05-21

## 2020-05-21 NOTE — PROGRESS NOTES
Dear Dr. Santana,  Medicare Home Health regulations requires [facility] to provide an initial assessment visit either within 48 hours of the patient's return home, or on the physician ordered Start of Care date.    There will be a delay in the Initial Assessment for Carlton Bravo; MRN [id number] due to staffing constraints.  We anticipate the Start of care will be 5/22 or 5/23/20.      Sincerely [facility]  Lurdes Flores RN  [phone]      TREE Santana MD

## 2020-05-21 NOTE — PROGRESS NOTES
5/21/20: Care Coordinator received call from EL Kline, at [facility]  - member had called her re: transportation.  Care Coordinator shared with Raisa that currently in process of scheduling.     Care Coordinator called member  - discussed transportation again and that will know after tomorrow but did reiterate to him that we may need to find a transport chair or w/c for him.  Care Coordinator also asked member after speaking with Raisa if possible for STS  to assist him with his walker to/from vehicle and use w/c at clinic.  He declined and stated that he must have w/c service to/from his door.  He state that then he would just miss his appt and not go.  Care Coordinator reiterated to member that Care Coordinator is working on this for member to transport him the safest way possible to his appts but this may mean getting his own equip.   He appreciated Care Coordinator assistance and stated would talk tomorrow. Member did ask Care Coordinator to look up who transported him home from the TCU as they at first didn't have a w/c but then had one in their van that they used.  Care Coordinator relayed this to CMS when scheduling rides.     Merari Reyna RN, PHN  [employer]

## 2020-05-21 NOTE — PROGRESS NOTES
5/21/20: Care Coordinator received email from JAG Cameron, that they are not able to get a hold of [employer] for STS transportation - they have called and unable to get a hold of them.  She left vms and email.  She will wait until tmrw a.m. and if they have not returned call she will use another STS provider.  Unfortunately, all other STS providers do not provide w/c - member must have own.  Care Coordinator gently encouraged member that may need to pursue own w/c or transport chair - member declined stating he does not want them taking room in his apartment.  Care Coordinator let member know she would f/u after tmrw.      Merari Reyna RN, PHN  [facility]

## 2020-05-21 NOTE — PROGRESS NOTES
Social Work Telephone Message Note  [facility]     Patient Name:  Carlton Bravo  /Age:  1940 (79 year old)    Referral Source: self  Reason for Referral:  Transportation issues for upcoming appointment     was contacted by Patient via telephone on 2020.  Patient had left a message with writer regarding transportation issues for an upcoming appointment with Dr Santana on .  Patient shared that he typically works with his [facility] Care Coordinator to arrange transportation to his appointments.      Writer called his [facility] CC - Christiana at [phone], however ended up leaving a message requesting a return call and asking for continued support this patient as he is worried that a ride won't be available when needed.     Informed Carlton, that a message was left with Christiana requesting additional support and of his worries.  Sw will continue to assist as needed.          FLORA Donato, [facility]    [facility]  [phone]  [email or web address]

## 2020-05-22 ENCOUNTER — PATIENT OUTREACH (OUTPATIENT)
Dept: GERIATRIC MEDICINE | Facility: CLINIC | Age: 80
End: 2020-05-22

## 2020-05-22 NOTE — PROGRESS NOTES
5/22/20: Care Coordinator received call from member - he states that Reggie was there to help install toilet seat riser - it does not fit.  Spoke with Jen at [facility] - she states that she will exchange it out for new.  Relayed to member.    Also, relayed ride info for appts to him and emailed him the ride details.     Merari Reyna RN, N  [employer]

## 2020-05-22 NOTE — PROGRESS NOTES
[facility] Care Coordination Contact    Arranged transportation thru [facility] for the below appt:  Appt Date & Time: 6-3-20 9:15AM CT & 10:40AM Dr pollock   Clinic Name & Address:  [facility] - [address] ([place]) and [address] (office)  [address], [place] [address]    Transportation Provider: [employer] [phone]   time:  Between 8:15 am-8:45am    [facility] Care Coordination Contact    Arranged transportation thru [facility] for the below appt:  Appt Date & Time: 6-12-20  Clinic Name & Address:  [facility]   Transportation Provider: [employer] [phone]   time:  9:50 AM-10:15AM    Notified CC of  times.    Informed [facility] that member needs a wheelchair. They will note that for this ride.   They will also let the transportation company know that member would like the  to stay during the appt, but if that is not possible, he will need to call them when the appt is done for .     Nisha Avila  Care Management Specialist   [facility]   [phone]

## 2020-05-23 ENCOUNTER — TELEPHONE (OUTPATIENT)
Dept: PEDIATRICS | Facility: CLINIC | Age: 80
End: 2020-05-23

## 2020-05-23 NOTE — TELEPHONE ENCOUNTER
[facility] Care utilizes an encounter to take the place of a direct phone call to your office. Please take a moment to review the below request. Please reply or route message to author of this encounter.  Message will act as a verbal OK of orders requested below. Thank you.    ORDER  SOC orders requested for patient following discharge from TCU following fall and sudural hematoma with tana procedure.    PCP listed as Lona Pettit CNP - Client has appointment with Dr Santana on 6/12/2020.  Would like 2 SN visits to begin the week of 5/24/2020    SN 2w2, 1w2, 3PRN for med mgmt, fall prevention education, incision assessment to right side of head tana procedure site, home safety, ETOH assess, neurological assess.    PT eval for HSE, fall prevention education, strength/mobility, equip needs.    OT - HHA- SW declined by patient    KACI Hazel  [phone]  [email or web address]    Agree with above orders and care plan.  Syed Santana MD

## 2020-06-02 ENCOUNTER — TELEPHONE (OUTPATIENT)
Dept: PEDIATRICS | Facility: CLINIC | Age: 80
End: 2020-06-02

## 2020-06-02 DIAGNOSIS — N13.8 BPH WITH OBSTRUCTION/LOWER URINARY TRACT SYMPTOMS: Primary | ICD-10-CM

## 2020-06-02 DIAGNOSIS — N40.1 BPH WITH OBSTRUCTION/LOWER URINARY TRACT SYMPTOMS: Primary | ICD-10-CM

## 2020-06-02 RX ORDER — TAMSULOSIN HYDROCHLORIDE 0.4 MG/1
0.4 CAPSULE ORAL DAILY
Qty: 90 CAPSULE | Refills: 3 | Status: SHIPPED | OUTPATIENT
Start: 2020-06-02 | End: 2020-06-26

## 2020-06-02 NOTE — TELEPHONE ENCOUNTER
Pt requesting Rx for tamsulosin, didn't see on med list  Thank you!  [facility]

## 2020-06-02 NOTE — TELEPHONE ENCOUNTER
Talked to patient on the phone. In TCU he was started on Flomax for urinary frequency with slow steam and he has helped. He is requesting refill. Prescription send to his pharmacy.

## 2020-06-03 ENCOUNTER — PATIENT OUTREACH (OUTPATIENT)
Dept: GERIATRIC MEDICINE | Facility: CLINIC | Age: 80
End: 2020-06-03

## 2020-06-03 NOTE — PROGRESS NOTES
6/3/20: Care Coordinator received call from member that  from [employer] arrived bu didn't have w/c and states that they don't provided them.  Member turned the  away and called Care Coordinator.  [facility] and confirmed they have w/c at office and to have member call [employer] to have them come back as Care Coordinator talked to member about using walker and  will assist with ambulation, etc.    Care Coordinator called member back - he states unable to get hold of [employer].  Care Coordinator called and spoke with dispatcher - she verified they do not have w/c.   She will send new  out.  Member aware.       Care Coordinator received call from member after he was home from [facility] - he states everything went relatively well - the drive assisted him and it did give him more confidence with his walker. He states that the  (Sean) did state they do have w/c and they need to be reserved.  Care Coordinator asked CMS to reach out to [facility] re: ride on 6/12 if w/c will be provided or not.     Merari Reyna RN, PHN  [facility]

## 2020-06-09 ENCOUNTER — TELEPHONE (OUTPATIENT)
Dept: PEDIATRICS | Facility: CLINIC | Age: 80
End: 2020-06-09

## 2020-06-09 NOTE — PROGRESS NOTES
6/8/20: Nisha CMS, reached out to [employer] and verified that they do not provide w/c.  They apologized for being told that they do.   Care Coordinator relayed this to member - he understands.  He will use his walker and assistance of .      Member has a neurosurgery f/u appt on 7/15  - details sent to CMS to schedule ride.     Merari Reyna RN, PHN  [facility]

## 2020-06-09 NOTE — TELEPHONE ENCOUNTER
I Have called pt given provider message to pt. Rescheduled for 6/26. Pt agreeable to change of appt.Gila BECK CMA

## 2020-06-09 NOTE — TELEPHONE ENCOUNTER
[facility] Call Center    Phone Message    May a detailed message be left on voicemail: yes     Reason for Call: Other: Patient states that he does not want to come to his appt on friday. that his home care nurse sees him and that should be good enough, but he just wanted to make sure Dr. Santana was okay with that before he canceled. Please advise.     Action Taken: Message routed to:  Primary Care p [phone]

## 2020-06-09 NOTE — TELEPHONE ENCOUNTER
At some point he needs to come in.  He was hospitalized last month with a fall and subdural hematoma following which he was in TCU.  He does need a follow-up with me for that reason.  If he does not want to keep the appointment this Friday and he should set 1 up in the next few weeks.  I do need to see him

## 2020-06-09 NOTE — PROGRESS NOTES
6/9/20: Care Coordinator spoke with member - he has canceled his appt with PCP for 6/12.  He states that he has had too much going on and cannot do the appt.  He has already called to reschedule his appt  - rescheduled to 6/26.  Sent info to CMS, Nisha, to cancel ride and reschedule ride.     Merari Reyna RN, PHN  [facility]

## 2020-06-10 NOTE — PROGRESS NOTES
[facility] Care Coordination Contact    Arranged transportation thru [facility] for the below appt:  Appt Date & Time: 6/26/20 @ 3:30 PM  Clinic Name & Address:  [facility]   Transportation Provider: [employer]     time:  2:30 PM-3 PM    Notified CC of  time.          [facility] Care Coordination Contact    Arranged transportation thru [facility] for the below appt:  Appt Date & Time: 7/15/20 @ 9:15 AM   Clinic Name & Address:  [facility] [address] [place] [address]  Transportation Provider: [employer]    time:  8:15 AM- 8:45 AM    Notified CC of  time.      Nisha Avila  Care Management Specialist   [facility]   [phone]

## 2020-06-16 ENCOUNTER — PATIENT OUTREACH (OUTPATIENT)
Dept: GERIATRIC MEDICINE | Facility: CLINIC | Age: 80
End: 2020-06-16

## 2020-06-16 NOTE — PROGRESS NOTES
6/16/20: Care Coordinator received call from member - he received bill from \[facility]\"" (ED services) for DOS 4/24/20. Care Coordinator placed call to \[facility]\"" at [phone] and provided insurance info - she will resubmit bill - member can disregard.  Relayed this to member.       Also, provided ride info to member for 6/26 and 7/15 appt.     Merari Reyna RN, PHN  [facility]

## 2020-06-19 NOTE — PROGRESS NOTES
6/19/20: Care Coordinator received email from  RN, Ramila, that she changed member's 6/26 appt to a phone visit therefore ride is to be canceled.  Sent to CMS to cancel.     Merari Reyna RN, N  [facility]

## 2020-06-19 NOTE — PROGRESS NOTES
Ride is cancelled.  Nisha Avila  Care Management Specialist   [facility]   [phone]

## 2020-06-26 ENCOUNTER — VIRTUAL VISIT (OUTPATIENT)
Dept: PEDIATRICS | Facility: CLINIC | Age: 80
End: 2020-06-26
Payer: COMMERCIAL

## 2020-06-26 DIAGNOSIS — S06.5XAA SUBDURAL HEMATOMA (H): Primary | ICD-10-CM

## 2020-06-26 DIAGNOSIS — N13.8 BPH WITH OBSTRUCTION/LOWER URINARY TRACT SYMPTOMS: ICD-10-CM

## 2020-06-26 DIAGNOSIS — E78.5 HYPERLIPIDEMIA LDL GOAL <100: ICD-10-CM

## 2020-06-26 DIAGNOSIS — F10.10 ALCOHOL ABUSE: ICD-10-CM

## 2020-06-26 DIAGNOSIS — I48.11 LONGSTANDING PERSISTENT ATRIAL FIBRILLATION (H): ICD-10-CM

## 2020-06-26 DIAGNOSIS — N40.1 BPH WITH OBSTRUCTION/LOWER URINARY TRACT SYMPTOMS: ICD-10-CM

## 2020-06-26 PROCEDURE — 99214 OFFICE O/P EST MOD 30 MIN: CPT | Mod: 95 | Performed by: INTERNAL MEDICINE

## 2020-06-26 RX ORDER — TAMSULOSIN HYDROCHLORIDE 0.4 MG/1
0.4 CAPSULE ORAL DAILY
Qty: 90 CAPSULE | Refills: 3 | Status: SHIPPED | OUTPATIENT
Start: 2020-06-26

## 2020-06-26 NOTE — PROGRESS NOTES
"Carlton Bravo is a 80 year old male who is being evaluated via a billable telephone visit.      The patient has been notified of following: Carlton Bravo    \"This telephone visit will be conducted via a call between you and your physician/provider. We have found that certain health care needs can be provided without the need for a physical exam.  This service lets us provide the care you need with a short phone conversation.  If a prescription is necessary we can send it directly to your pharmacy.  If lab work is needed we can place an order for that and you can then stop by our lab to have the test done at a later time.    Telephone visits are billed at different rates depending on your insurance coverage. During this emergency period, for some insurers they may be billed the same as an in-person visit.  Please reach out to your insurance provider with any questions.    If during the course of the call the physician/provider feels a telephone visit is not appropriate, you will not be charged for this service.\"    Patient has given verbal consent for Telephone visit?  Yes    What phone number would you like to be contacted at? [phone]    How would you like to obtain your AVS? Mail a copy    Subjective     Carlton Bravo is a 80 year old male who presents via phone visit today for the following health issues:    [facility]  ED/ Followup:    Facility:  [facility]  Date of visit: 05/01/2020  Reason for visit: SDH (subdural hematoma) Weakness generalized   Current Status: Pt states that after he was discharged from the ED the above day, he is currently feeling fine and does not have further concerns for today visit.      Patient had fall related subdural hematoma requiring surgical evacuation with tana holes on the right side. Surgery was carried out on 04/26/2020. Patient indicates he has been home and going every well. His coumadin is on hold for Atrial fibrillation. He offers no complaints. "            Patient Active Problem List   Diagnosis     HTN, goal below 140/90     Idiopathic cardiomyopathy (H)     Hemorrhage of gastrointestinal tract     Long term current use of anticoagulant therapy     Acute posthemorrhagic anemia     Alcohol abuse     History of colonic polyps     BPH with obstruction/lower urinary tract symptoms     Microalbuminuria     H/O gastric ulcer     Health Care Home     Primary osteoarthritis of both knees     Bilateral leg edema     Longstanding persistent atrial fibrillation (H)     Poor balance     Hyperlipidemia LDL goal <100     Urinary incontinence     Subdural hematoma (H)     Past Surgical History:   Procedure Laterality Date     JENNA HOLE(S) EVACUATE HEMATOMA SUBDURAL Right 04/24/2020    fall related subdural hematoma     CATARACT IOL, RT/LT Left 02/14/2019     CATARACT IOL, RT/LT Right 02/28/2019     KNEE SURGERY  1990    bilateral arthroscopic     PHACOEMULSIFICATION WITH STANDARD INTRAOCULAR LENS IMPLANT Left 2/14/2019    Procedure: LEFT PHACOEMULSIFICATION WITH STANDARD INTRAOCULAR LENS IMPLANT;  Surgeon: Rojas Brown MD;  Location: [facility] OR     PHACOEMULSIFICATION WITH STANDARD INTRAOCULAR LENS IMPLANT Right 2/28/2019    Procedure: RIGHT PHACOEMULSIFICATION WITH STANDARD INTRAOCULAR LENS IMPLANT;  Surgeon: Rojas Brown MD;  Location: [facility] OR       Social History     Tobacco Use     Smoking status: Never Smoker     Smokeless tobacco: Never Used   Substance Use Topics     Alcohol use: Yes     Alcohol/week: 0.0 standard drinks     Comment: was drinking about 5 drinks a night but quit 7/2015 due to GI bleed.      Family History   Problem Relation Age of Onset     Myocardial Infarction Other      Cerebrovascular Disease Father      Hypertension No family hx of      Diabetes No family hx of      Colon Cancer No family hx of      Prostate Cancer No family hx of          Current Outpatient Medications   Medication Sig Dispense Refill     carvedilol (COREG)  6.25 MG tablet Take 0.5 tablets (3.125 mg) by mouth 2 times daily (with meals) Patient is taking 6.25 mg once daily states he changed the dosing himself 180 tablet 1     pantoprazole (PROTONIX) 40 MG EC tablet Take 1 tablet (40 mg) by mouth daily 90 tablet 1     pravastatin (PRAVACHOL) 80 MG tablet Take 1 tablet (80 mg) by mouth daily 90 tablet 1     tamsulosin (FLOMAX) 0.4 MG capsule Take 1 capsule (0.4 mg) by mouth daily 90 capsule 3     No Known Allergies    Reviewed and updated as needed this visit by Provider         Review of Systems   Constitutional, HEENT, cardiovascular, pulmonary, GI, , musculoskeletal, neuro, skin, endocrine and psych systems are negative, except as otherwise noted.       Objective   Reported vitals:  There were no vitals taken for this visit.   healthy, alert and no distress  PSYCH: Alert and oriented times 3; coherent speech, normal   rate and volume, able to articulate logical thoughts, able   to abstract reason, no tangential thoughts, no hallucinations   or delusions  His affect is normal and pleasant  RESP: No cough, no audible wheezing, able to talk in full sentences  Remainder of exam unable to be completed due to telephone visits    Diagnostic Test Results:  Labs reviewed in Epic        Assessment/Plan:    1. Subdural hematoma - right side secondary to fall surgically evacuated. Has recovered well. Has follow-up CT scan planned by neurosurgery.  2. Longstanding persistent atrial fibrillation. Patient was on coumadin prior to subdural hematoma. He is at high risk for recurrent bleeding, due to falls and alcohol abuse.  3. Alcohol abuse.  4. BPH with obstructive lower urinary tract symptoms. Improved with Tamsulosin. Continue same.  5. Hyperlipidemia treated with Pravastatin.      Phone call duration:  15  minutes    Syed Santana MD

## 2020-06-26 NOTE — PROGRESS NOTES
"Subjective     Carlton Bravo is a 80 year old male who presents to clinic today for the following health issues:    HPI     Hospital Follow-up Visit:    Hospital/Nursing Home/IP Rehab Facility: {INPT AND SNF DISCHARGE:279832}  Date of Admission: ***  Date of Discharge: ***  Reason(s) for Admission: ***      Was your hospitalization related to COVID-19? {Yes add questions_No:795034}  Problems taking medications regularly:  {NONE DEFAULTED:062799::\"None\"}  Medication changes since discharge: {NONE DEFAULTED:321047::\"None\"}  Problems adhering to non-medication therapy:  {NONE DEFAULTED:823339::\"None\"}    Summary of hospitalization:  {HOSPITAL DISCHARGE SUMMARY INFO:843148::\"[place] hospital discharge summary reviewed\"}  Diagnostic Tests/Treatments reviewed.  Follow up needed: {NONE DEFAULTED:113009::\"none\"}  Other Healthcare Providers Involved in Patient s Care:         {those currently involved after discharge:275453::\"None\"}  Update since discharge: {IMPROVED DEFAULT:918362::\"improved.\"} {TIP  Include information from family/caregivers, SNF, Care Coordination :037339}      Post Discharge Medication Reconciliation: {ACO Med Rec (Provider):250988}.  Plan of care communicated with {Communicate Plan to:922569::\"patient\"}     {Reference  Coding guidelines- Moderate Complexity F2F/Video within 7 - 14 days of discharge 66406, High Complexity F2F/Video within 7 days 19029 or twflyw39 days 11937 :631519}         {additonal problems for provider to add (Optional):622058}    {HIST REVIEW/ LINKS 2 (Optional):033114}    {Additional problems for the provider to add (optional):791503}  Reviewed and updated as needed this visit by Provider         Review of Systems   {ROS COMP (Optional):919427}      Objective    There were no vitals taken for this visit.  There is no height or weight on file to calculate BMI.  Physical Exam   {Exam List (Optional):224563}    {Diagnostic Test Results (Optional):429226::\"Diagnostic Test " "Results:\",\"Labs reviewed in Epic\"}        {PROVIDER CHARTING PREFERENCE:353502}      "

## 2020-07-09 ENCOUNTER — PATIENT OUTREACH (OUTPATIENT)
Dept: GERIATRIC MEDICINE | Facility: CLINIC | Age: 80
End: 2020-07-09

## 2020-07-09 ENCOUNTER — TELEPHONE (OUTPATIENT)
Dept: PEDIATRICS | Facility: CLINIC | Age: 80
End: 2020-07-09

## 2020-07-09 DIAGNOSIS — Z79.01 LONG TERM CURRENT USE OF ANTICOAGULANT THERAPY: ICD-10-CM

## 2020-07-09 NOTE — TELEPHONE ENCOUNTER
"TO PCP:    Per virtual Visit notes on 6/26/20, patient coumadin is on hold:     \"Patient had fall related subdural hematoma requiring surgical evacuation with tana holes on the right side. Surgery was carried out on 04/26/2020. Patient indicates he has been home and going every well. His coumadin is on hold for Atrial fibrillation\"        Will patient be off coumadin long term, are we able to close his anticoagulation therapy with the [facility]? The warfarin was not on his discharge paperwork from his emergency stay on 5/1/20.    Thank you,  Carol Banuelos RN    "

## 2020-07-09 NOTE — PROGRESS NOTES
Ride cancelled via [facility].    Nisha Avila  Care Management Specialist   [facility]   [phone]

## 2020-07-09 NOTE — PROGRESS NOTES
7/9/20: Care Coordinator received call from member stating he canceled his appt for next week 7/15. He is requesting ride to be canceled as well.  He states d/t covid he does not feel safe attending the visit.  He f/u with PCP virtually and will do the same again when needed.     Sent to CMS to cancel.     Merari Reyna RN, PHN  [facility]

## 2020-07-17 ENCOUNTER — TELEPHONE (OUTPATIENT)
Dept: CARE COORDINATION | Facility: CLINIC | Age: 80
End: 2020-07-17

## 2020-07-17 NOTE — TELEPHONE ENCOUNTER
[facility] utilizes an encounter to take the place of a direct phone call to your office. Please take a moment to review the below request. Please reply or route message to author of this encounter.  Message will act as a verbal OK of orders requested below. Thank you.    MD summary  S/ Pt admitted to homecare following a recent fall in the home with brain bleed. Pt was hospitalized for over a week with brain surgery to release some of the blood/pressure on the brain. After that, pt was admitted to TCU for strengthening. He is recertified to homecare today for chronic care mgmt. He continues to drink at least 1 cup of Vodka per day and has no plans to quit. He has been using his walker at all times and has a toilet riser that PT installed. PT did one time visit for equip install and to provide exercises and fall prevention plan. Pts surgical incision is now healed. he has no residual affects from brain bleed. Vitals stable. Mood approp, no memory loss noted. PT refusing any further Coumadin use and refused last neurology apt.  B/ idiopathic cardiomyopathy, hypertension, AFib, ETOH with cirrhosis, repeated falls with injury. hx of subdural hematoma, HLD, GERD  A/ Pt high risk for rehospitalization due to high fall risk, yuri abusing ETOH. Lives alone with no close family or friends. Has one friend Reggie that assists if needed.   R/ SN 1xmox1, 2xmox1, 1xmox1, 5 prn    Ramila Valverde RN,   [facility]  [phone]  [email or web address]      Agree with the above orders and care plan.  Syed Santana MD

## 2020-07-31 DIAGNOSIS — Z53.9 DIAGNOSIS NOT YET DEFINED: Primary | ICD-10-CM

## 2020-07-31 PROCEDURE — G0179 MD RECERTIFICATION HHA PT: HCPCS | Performed by: INTERNAL MEDICINE

## 2020-08-04 ENCOUNTER — TELEPHONE (OUTPATIENT)
Dept: PEDIATRICS | Facility: CLINIC | Age: 80
End: 2020-08-04

## 2020-08-04 NOTE — TELEPHONE ENCOUNTER
Left message for patient to return clinic call regarding scheduling. Patient needs a Return  appointment for 6 month recheck with Syed Santana MD on or after 8/25/2020 and  nonfasting labs. Number to clinic and Mychart option given, please assist in scheduling once patient returns clinic call.     Call Center OKAY TO SCHEDULE.    Thanks,   Stacy Loza  Primary Care   [facility]

## 2020-08-05 ENCOUNTER — TELEPHONE (OUTPATIENT)
Dept: CARE COORDINATION | Facility: CLINIC | Age: 80
End: 2020-08-05

## 2020-08-05 NOTE — TELEPHONE ENCOUNTER
[facility] utilizes an encounter to take the place of a direct phone call to your office. Please take a moment to review the below request. Please reply or route message to author of this encounter.  Message will act as a verbal OK of orders requested below. Thank you.    ORDER    Pt received a call yesterday notifying him that he needed labs drawn. Is this something that I can draw at home? And when does he need this drawn. He just had a phone visit with you last month    Ramila Valverde RN,   [facility]  [phone]  [email or web address]       Can be drawn at home. Pending lab orders are for CBC and CMP    Syed Santana MD

## 2020-08-11 DIAGNOSIS — I42.9 IDIOPATHIC CARDIOMYOPATHY (H): ICD-10-CM

## 2020-08-11 DIAGNOSIS — F10.10 ALCOHOL ABUSE: ICD-10-CM

## 2020-08-11 LAB
ALBUMIN SERPL-MCNC: 2.8 G/DL (ref 3.4–5)
ALP SERPL-CCNC: 76 U/L (ref 40–150)
ALT SERPL W P-5'-P-CCNC: 24 U/L (ref 0–70)
ANION GAP SERPL CALCULATED.3IONS-SCNC: 8 MMOL/L (ref 3–14)
AST SERPL W P-5'-P-CCNC: 31 U/L (ref 0–45)
BILIRUB SERPL-MCNC: 0.7 MG/DL (ref 0.2–1.3)
BUN SERPL-MCNC: 13 MG/DL (ref 7–30)
CALCIUM SERPL-MCNC: 8.8 MG/DL (ref 8.5–10.1)
CHLORIDE SERPL-SCNC: 107 MMOL/L (ref 94–109)
CO2 SERPL-SCNC: 25 MMOL/L (ref 20–32)
CREAT SERPL-MCNC: 0.91 MG/DL (ref 0.66–1.25)
ERYTHROCYTE [DISTWIDTH] IN BLOOD BY AUTOMATED COUNT: 15.1 % (ref 10–15)
GFR SERPL CREATININE-BSD FRML MDRD: 79 ML/MIN/{1.73_M2}
GLUCOSE SERPL-MCNC: 89 MG/DL (ref 70–99)
HCT VFR BLD AUTO: 33.2 % (ref 40–53)
HGB BLD-MCNC: 10.6 G/DL (ref 13.3–17.7)
MCH RBC QN AUTO: 28.8 PG (ref 26.5–33)
MCHC RBC AUTO-ENTMCNC: 31.9 G/DL (ref 31.5–36.5)
MCV RBC AUTO: 90 FL (ref 78–100)
PLATELET # BLD AUTO: 235 10E9/L (ref 150–450)
POTASSIUM SERPL-SCNC: 4.1 MMOL/L (ref 3.4–5.3)
PROT SERPL-MCNC: 6.5 G/DL (ref 6.8–8.8)
RBC # BLD AUTO: 3.68 10E12/L (ref 4.4–5.9)
SODIUM SERPL-SCNC: 140 MMOL/L (ref 133–144)
WBC # BLD AUTO: 5.8 10E9/L (ref 4–11)

## 2020-08-11 PROCEDURE — 80053 COMPREHEN METABOLIC PANEL: CPT | Performed by: INTERNAL MEDICINE

## 2020-08-11 PROCEDURE — 36415 COLL VENOUS BLD VENIPUNCTURE: CPT | Performed by: INTERNAL MEDICINE

## 2020-08-11 PROCEDURE — 85027 COMPLETE CBC AUTOMATED: CPT | Performed by: INTERNAL MEDICINE

## 2020-08-19 ENCOUNTER — PATIENT OUTREACH (OUTPATIENT)
Dept: GERIATRIC MEDICINE | Facility: CLINIC | Age: 80
End: 2020-08-19

## 2020-08-28 NOTE — PROGRESS NOTES
8/19/20: Care Coordinator received call from  Member. He was inquiring about PCP appts. He states he spoke with KACI Mcgrath with HC and can do virtual appt with PCP in 10/2020 but thought then he needed to see PCP f2f in 2/2021 for Medicare requirements and was questioning this d/t Covid as he states he does not feel comfortable going out.   Care Coordinator understood member's concerns and discussed that would talk about as gets closer to 2/2021 and what recommendations are at that time.  He agreed.     [employer] Six-Month Telephone Assessment    6 month telephone assessment completed on 8/19/20.    ER visits: Yes -  [facility]  Hospitalizations: Yes -  [facility]  TCU stays: Yes - [facility]  Significant health status changes: none  Falls/Injuries: No  ADL/IADL changes: No  Changes in services: No    Caregiver Assessment follow up:  N/A    Goals: See POC in chart for goal progress documentation.      Will see member in 6 months for an annual health risk assessment.   Encouraged member to call CC with any questions or concerns in the meantime.     Merari Reyna RN, PHN  [employer]

## 2020-09-15 ENCOUNTER — TELEPHONE (OUTPATIENT)
Dept: CARE COORDINATION | Facility: CLINIC | Age: 80
End: 2020-09-15

## 2020-09-15 NOTE — TELEPHONE ENCOUNTER
[facility] utilizes an encounter to take the place of a direct phone call to your office. Please take a moment to review the below request. Please reply or route message to author of this encounter.  Message will act as a verbal OK of orders requested below. Thank you.    ORDER    SN [id number], 3 prn to assess chronic medical conditions, ETOH use, falls prevention planning, care coordination, give flu shot.     Ramila Valverde RN,   [facility]  [phone]  [email or web address]      Agree with the above orders and care plan  Syed Santana MD

## 2020-10-05 DIAGNOSIS — E78.5 DYSLIPIDEMIA: Primary | ICD-10-CM

## 2020-10-05 DIAGNOSIS — E78.5 HYPERLIPEMIA: ICD-10-CM

## 2020-10-09 DIAGNOSIS — Z53.9 DIAGNOSIS NOT YET DEFINED: Primary | ICD-10-CM

## 2020-10-09 PROCEDURE — G0179 MD RECERTIFICATION HHA PT: HCPCS | Performed by: INTERNAL MEDICINE

## 2020-10-09 RX ORDER — PRAVASTATIN SODIUM 80 MG/1
80 TABLET ORAL DAILY
Qty: 90 TABLET | Refills: 1 | Status: SHIPPED | OUTPATIENT
Start: 2020-10-09 | End: 2021-01-01

## 2020-10-09 NOTE — TELEPHONE ENCOUNTER
PRAVASTATIN SODIUM 80MG TABS  Last Written Prescription Date:  2/25/2020  Last Fill Quantity: 90,   # refills: 1  Last Office Visit : 6/26/2020  Future Office visit:  10/19/2020  90 Tabs, 1 Refills sent to pharm       Nita Roa RN  Central Triage Red Flags/Med Refills

## 2020-10-19 NOTE — PROGRESS NOTES
"Carlton Bravo is a 80 year old male who is being evaluated via a billable telephone visit.      The patient has been notified of following:     \"This telephone visit will be conducted via a call between you and your physician/provider. We have found that certain health care needs can be provided without the need for a physical exam.  This service lets us provide the care you need with a short phone conversation.  If a prescription is necessary we can send it directly to your pharmacy.  If lab work is needed we can place an order for that and you can then stop by our lab to have the test done at a later time.    Telephone visits are billed at different rates depending on your insurance coverage. During this emergency period, for some insurers they may be billed the same as an in-person visit.  Please reach out to your insurance provider with any questions.    If during the course of the call the physician/provider feels a telephone visit is not appropriate, you will not be charged for this service.\"    Patient has given verbal consent for Telephone visit?  Yes    What phone number would you like to be contacted at? [phone]    How would you like to obtain your AVS? Mail a copy    Subjective     Carlton Bravo is a 80 year old male who presents via phone visit today for the following health issues:    HPI     Hyperlipidemia Follow-Up      Are you regularly taking any medication or supplement to lower your cholesterol?   Yes- pravastatin 80 mg    Are you having muscle aches or other side effects that you think could be caused by your cholesterol lowering medication?  No    Hypertension Follow-up      Do you check your blood pressure regularly outside of the clinic? Yes     Are you following a low salt diet? No    Are your blood pressures ever more than 140 on the top number (systolic) OR more   than 90 on the bottom number (diastolic), for example 140/90? Yes    Patient being seen for follow-up    Patient being " seen for follow-up regarding hypertension, chronic atrial fibrillation, idiopathic cardiomyopathy and other problems listed in the problem list.  The patient indicates he is doing fine at home.  His blood pressure when checked by home health nurse has been good.  He is now using a wheeled walker and has not fallen since he started using it.  He continues to drink alcohol and does not want to give it up.  He is not anticoagulated currently.  Earlier in the year he had subdural hematoma which required evacuation.  The neurosurgeon had plan to follow him up with a repeat CT scan and he decided not to go back for follow-up since he is clinically feeling fine.  He denies chest pain or shortness of breath.  Denies leg edema.        Review of Systems   Constitutional, HEENT, cardiovascular, pulmonary, GI, , musculoskeletal, neuro, skin, endocrine and psych systems are negative, except as otherwise noted.       Objective          Vitals:  No vitals were obtained today due to virtual visit.    healthy, alert and no distress  PSYCH: Alert and oriented times 3; coherent speech, normal   rate and volume, able to articulate logical thoughts, able   to abstract reason, no tangential thoughts, no hallucinations   or delusions  His affect is normal  RESP: No cough, no audible wheezing, able to talk in full sentences  Remainder of exam unable to be completed due to telephone visits            Assessment/Plan:    Assessment & Plan     1.  Longstanding persistent atrial fibrillation.  Currently patient not anticoagulated.  He was on warfarin for a long time but stopped following subdural hematomas earlier this year requiring hospitalization treatment.  Today we discussed about restarting anticoagulation therapy.  He is not willing to give up his alcohol use.  Though he has not fallen lately he has had frequent falls in the past.  The walker has helped.  But he still remains high risk for falls.  I discussed potential benefit of  anticoagulation therapy in view of persistent atrial fibrillation.  His risk for stroke is probably around 4-5   % per year.  The risk of falls due to poor balance and alcohol use and recurrent subdural hematoma also the probability.  The patient made a decision not to go back on anticoagulation therapy.  2.  Essential hypertension with blood pressure at goal.  He is monitored frequently by home health nurse.  He is on carvedilol which he will continue.  3.  Alcohol abuse.  Patient has no desire to stop alcohol use.  4.  Idiopathic cardiomyopathy has been well compensated.  Continue with carvedilol.  5.  Hyperlipidemia continue with pravastatin.  Recommend checking lipid profile and February.  6.  Poor balance.    Advise follow-up in person in late February early March.  Patient declined in person follow-up.  He will do either phone or video visit.  Like to have labs done at that time including CBC CMP and a lipid profile.  Home health nurse can draw the blood.  Patient agreeable with that plan.            Return in about 4 months (around 2/25/2021), or video visit with lab drawn at home by home health, for visit with fasting lab.    Syed Santana MD  [facility]    Phone call duration: 13 minutes

## 2020-11-18 NOTE — PROGRESS NOTES
Care Coordinator received call from member - he wanted to inform Care Coordinator that his [facility] RN, Ramila, moved to [place] and he has a new RN - Leeanna.   He states things went very well with their first meeting.     He states that he is doing very well amidst this Pandemic - he enjoys being isolate at home.  He has no other needs but will call Care Coordinator if he does.     Merari Reyna, RN, N  [facility]

## 2020-11-22 NOTE — TELEPHONE ENCOUNTER
..[facility] now requests orders and shares plan of care/discharge summaries for some patients through [employer].  Please REPLY TO THIS MESSAGE OR ROUTE BACK TO THE AUTHOR in order to give authorization for orders when needed.  This is considered a verbal order, you will still receive a faxed copy of orders for signature.  Thank you for your assistance in improving collaboration for our patients.    ORDER  SN 1m2, 2 prn Next visit to be in month of December    MD SUMMARY/PLAN OF CARE  SITUATION Met with patient for 60 day recertification of home care services. patient has been receiving home care to provide assessment and education of chronic conditions, medication management and home safety. His VS are WNL. Denies SOB/cough. Lung sounds clear. Alert and oriented x 3. Denies numbness/tingling. Has tremors in the morning sometimes. Continues to drink vodka daily, usually in the afternoon/evening. He generally stays home, leaving only to go to the doctor. Gets groceries delivered to his home and requires use of walker. No additional falls and continues to not have any side effects from previous fall. Denies any headache, vision changes or other issues. Per patient, he met with primary doctor and they decided that it is best for him to remain off warfarin at this time. Due to his alcohol use, it is very difficult to keep INR level within the goal range. It may not be giving him the benefit he needs when he does take it. The risk of injury r/t fall may be greater than the benefit he recieves. His appetite is fair to good. He drinks 1 Ensure per day, eats a light breakfast and usually has a good dinner. Is knowledgable about nutrition and enjoys fruits/vegetables.    BACKGROUND 81 yo male who lives along in an apartment. Medical hx includes idiopathic cardiomyopathy, hypertension, AFib, ETOH with cirrhosis, repeated falls with injury. hx of subdural hematoma, HLD, GERD.    ANALYSIS Elderly male at risk for  injury and complications r/t chronic cardiac disease and alcoholism. He is stable at the time, but remains at high risk r/t onging alcohol abuse and no caregiver.    RECOMMENDATION Continue skilled nurse vistis 1x/month for assessment and educations.   Thank you for this referral.     Leeanna Gleason RN  [phone]    Agree with above orders and care plan.  Syed Santana MD

## 2021-01-01 ENCOUNTER — VIRTUAL VISIT (OUTPATIENT)
Dept: FAMILY MEDICINE | Facility: CLINIC | Age: 81
End: 2021-01-01
Payer: COMMERCIAL

## 2021-01-01 ENCOUNTER — TELEPHONE (OUTPATIENT)
Dept: FAMILY MEDICINE | Facility: CLINIC | Age: 81
End: 2021-01-01

## 2021-01-01 ENCOUNTER — PATIENT OUTREACH (OUTPATIENT)
Dept: GERIATRIC MEDICINE | Facility: CLINIC | Age: 81
End: 2021-01-01

## 2021-01-01 ENCOUNTER — DOCUMENTATION ONLY (OUTPATIENT)
Dept: OTHER | Facility: CLINIC | Age: 81
End: 2021-01-01

## 2021-01-01 ENCOUNTER — MEDICAL CORRESPONDENCE (OUTPATIENT)
Dept: HEALTH INFORMATION MANAGEMENT | Facility: CLINIC | Age: 81
End: 2021-01-01

## 2021-01-01 ENCOUNTER — TELEPHONE (OUTPATIENT)
Dept: CARE COORDINATION | Facility: CLINIC | Age: 81
End: 2021-01-01

## 2021-01-01 ENCOUNTER — DOCUMENTATION ONLY (OUTPATIENT)
Dept: CARE COORDINATION | Facility: CLINIC | Age: 81
End: 2021-01-01

## 2021-01-01 DIAGNOSIS — F10.10 ALCOHOL ABUSE: ICD-10-CM

## 2021-01-01 DIAGNOSIS — Z53.9 DIAGNOSIS NOT YET DEFINED: Primary | ICD-10-CM

## 2021-01-01 DIAGNOSIS — I10 HTN, GOAL BELOW 140/90: ICD-10-CM

## 2021-01-01 DIAGNOSIS — I42.9 IDIOPATHIC CARDIOMYOPATHY (H): Primary | ICD-10-CM

## 2021-01-01 DIAGNOSIS — E78.5 HYPERLIPIDEMIA LDL GOAL <100: ICD-10-CM

## 2021-01-01 DIAGNOSIS — I48.11 LONGSTANDING PERSISTENT ATRIAL FIBRILLATION (H): ICD-10-CM

## 2021-01-01 DIAGNOSIS — K70.9 LIVER DISEASE, CHRONIC, DUE TO ALCOHOL (H): Primary | ICD-10-CM

## 2021-01-01 DIAGNOSIS — R26.89 POOR BALANCE: ICD-10-CM

## 2021-01-01 DIAGNOSIS — I42.9 IDIOPATHIC CARDIOMYOPATHY (H): ICD-10-CM

## 2021-01-01 LAB
ALBUMIN SERPL-MCNC: 2 G/DL (ref 3.4–5)
ALP SERPL-CCNC: 189 U/L (ref 40–150)
ALT SERPL W P-5'-P-CCNC: 34 U/L (ref 0–70)
ANION GAP SERPL CALCULATED.3IONS-SCNC: 13 MMOL/L (ref 3–14)
AST SERPL W P-5'-P-CCNC: 101 U/L (ref 0–45)
BILIRUB SERPL-MCNC: 1.8 MG/DL (ref 0.2–1.3)
BUN SERPL-MCNC: 10 MG/DL (ref 7–30)
CALCIUM SERPL-MCNC: 8.3 MG/DL (ref 8.5–10.1)
CHLORIDE SERPL-SCNC: 106 MMOL/L (ref 94–109)
CHOLEST SERPL-MCNC: 125 MG/DL
CO2 SERPL-SCNC: 23 MMOL/L (ref 20–32)
CREAT SERPL-MCNC: 0.79 MG/DL (ref 0.66–1.25)
ERYTHROCYTE [DISTWIDTH] IN BLOOD BY AUTOMATED COUNT: 21.3 % (ref 10–15)
GFR SERPL CREATININE-BSD FRML MDRD: 83 ML/MIN/{1.73_M2}
GLUCOSE SERPL-MCNC: 74 MG/DL (ref 70–99)
HCT VFR BLD AUTO: 32.9 % (ref 40–53)
HDLC SERPL-MCNC: 33 MG/DL
HGB BLD-MCNC: 11.3 G/DL (ref 13.3–17.7)
LDLC SERPL CALC-MCNC: 77 MG/DL
MCH RBC QN AUTO: 32.4 PG (ref 26.5–33)
MCHC RBC AUTO-ENTMCNC: 34.3 G/DL (ref 31.5–36.5)
MCV RBC AUTO: 94 FL (ref 78–100)
NONHDLC SERPL-MCNC: 92 MG/DL
PLATELET # BLD AUTO: 146 10E9/L (ref 150–450)
POTASSIUM SERPL-SCNC: 3.8 MMOL/L (ref 3.4–5.3)
PROT SERPL-MCNC: 6.5 G/DL (ref 6.8–8.8)
RBC # BLD AUTO: 3.49 10E12/L (ref 4.4–5.9)
SODIUM SERPL-SCNC: 142 MMOL/L (ref 133–144)
TRIGL SERPL-MCNC: 75 MG/DL
WBC # BLD AUTO: 6.3 10E9/L (ref 4–11)

## 2021-01-01 PROCEDURE — G0179 MD RECERTIFICATION HHA PT: HCPCS | Performed by: INTERNAL MEDICINE

## 2021-01-01 PROCEDURE — 80061 LIPID PANEL: CPT | Performed by: INTERNAL MEDICINE

## 2021-01-01 PROCEDURE — 80053 COMPREHEN METABOLIC PANEL: CPT | Performed by: INTERNAL MEDICINE

## 2021-01-01 PROCEDURE — 99214 OFFICE O/P EST MOD 30 MIN: CPT | Mod: 95 | Performed by: INTERNAL MEDICINE

## 2021-01-01 PROCEDURE — 85027 COMPLETE CBC AUTOMATED: CPT | Performed by: INTERNAL MEDICINE

## 2021-01-01 RX ORDER — FOLIC ACID 1 MG/1
1 TABLET ORAL DAILY
Qty: 90 TABLET | Refills: 3 | Status: SHIPPED | OUTPATIENT
Start: 2021-01-01

## 2021-01-04 NOTE — TELEPHONE ENCOUNTER
0700 Bedside shift change report given to [facility], RN (oncoming nurse) by [address], RN (offgoing nurse). Report included the following information SBAR, Kardex, Intake/Output, MAR, Recent Results and Cardiac Rhythm NSR.     0815 Attempted to obtain labs ordered from last night. Unable to obtain labs and [facility]. Called PICC team and notified them of need for lab draw and new PIV.     0930 Waiver signed with pt for self-supplied insulin pump. Pt aware of need to fill out worksheet for insulin pump. 1240 Pt has supplies at bedside to change insulin pump. Pump likely needing to be changed this shift. Will attempt to contact Diabetes Treatment Team to ensure it is OK for pt to use her home supplies. 1131 Yesenia Richards, able to obtain labs but unable to place new PIV. Awaiting arrival of PICC team to place new PIV.     1330 Received call from lab that labs hemolyzed. New order placed. Awaiting arrival of PICC team to draw labs and place new PIV.     1405 PICC team at bedside to place new PIV and draw labs. 1445 Pt changed insulin pump set using her supplies from home as this RN was unable to speak with DTC. Will inquire with DTC about future insulin pump changes. 1610 DTC on unit rounding on pt. Spoke with pt directly over the phone regarding use of insulin pump and blood glucose control. 354 [facility] [address] Librado Watkins at bedside. Plan to add PRN xanax for anxiety as pt has a lot of personal issues going on outside of her hospital stay. Pt weaned to 1L NC. Pt may be appropriate for discharge tomorrow if able to be weaned off O2. End of Shift Note    Bedside shift change report given to [address], RN (oncoming nurse) by Sariah Cowan (offgoing nurse).   Report included the following information SBAR, Kardex, Intake/Output, MAR, Recent Results and Cardiac Rhythm NSR    Shift worked:  8782-8557     Shift summary and any significant changes:     Pt weaned to 1L NC, PRN xanax for anxiety, insulin Martha is calling requesting a call back from nurse. Martha states she spoke with someone earlier and would like a call back [phone]   pump set changed by pt, possible D/C tomorrow if off O2     Concerns for physician to address:  Discharge planning     Zone phone for oncoming shift:   [phone]       Activity:  Activity Level: Up with Assistance  Number times ambulated in hallways past shift: 0  Number of times OOB to chair past shift: 2    Cardiac:   Cardiac Monitoring: Yes      Cardiac Rhythm: Normal sinus rhythm    Access:   Current line(s): PIV     Genitourinary:   Urinary status: voiding    Respiratory:   O2 Device: Nasal cannula  Chronic home O2 use?: NO  Incentive spirometer at bedside: NO     GI:  Last Bowel Movement Date: 01/04/21  Current diet:  DIET DIABETIC CONSISTENT CARB Regular  DIET NUTRITIONAL SUPPLEMENTS All Meals; Glucerna Shake  Passing flatus: YES  Tolerating current diet: YES  % Diet Eaten: 50 %    Pain Management:   Patient states pain is manageable on current regimen: YES    Skin:  Mc Score: 21  Interventions: increase time out of bed    Patient Safety:  Fall Score:  Total Score: 4  Interventions: bed/chair alarm, pt to call before getting OOB and stay with me (per policy)  High Fall Risk: Yes    Length of Stay:  Expected LOS: - - -  Actual LOS: [address] [address]

## 2021-01-13 NOTE — PROGRESS NOTES
[facility] Care Coordination Contact    [facility] Home Visit Assessment     Home visit for Health Risk Assessment with Carlton Bravo completed on January 12, 2021    Type of residence:: Town home  Current living arrangement:: I live alone     Assessment completed with:: Patient    Current Care Plan  Member currently receiving the following home care services: Skilled Nursing   Member currently receiving the following community resources:    No formal services at this time    Medication Review  Medication reconciliation completed in Epic: Yes  Medication set-up & administration: RN set up monthly.  Self-administers medications.  Medication Risk Assessment Medication (1 or more, place referral to MTM): Taking 1 or more high-risk medications for adults >65 years  MTM Referral Placed: No: declines    Mental/Behavioral Health   Depression Screening:           Mental health DX:: No        Falls Assessment:   Fallen 2 or more times in the past year?: Yes   Any fall with injury in the past year?: No    ADL/IADL Dependencies:   Dependent ADLs:: Bathing  Dependent IADLs:: Cleaning, Shopping, Meal Preparation, Money Management, Transportation    [facility] sponsored benefits: Shared information re: Silver Sneakers/gym memberships, ASA, Calcium +D.    PCA Assessment completed at visit: Not Applicable     Elderly Waiver Eligibility: Yes-will continue on EW    Care Plan & Recommendations: open to EW - no services at this time but plans for bathroom remodel and/or equip.     See [facility] for detailed assessment information.    Follow-Up Plan: Member informed of future contact, plan to f/u with member with a 6 month telephone assessment.  Contact information shared with member and family, encouraged member to call with any questions or concerns at any time.    [facility] care continuum providers: Please refer to Health Care Home on the [facility] Problem List to view this patient's [facility] Care Plan  Summary.    Merari Reyna RN, N  [facility]

## 2021-01-14 NOTE — PROGRESS NOTES
[facility] Care Coordination Contact    Received after visit chart from care coordinator.  Completed following tasks: Mailed copy of care plan to client and Updated services in access   and Provider Signature - No POC Shared:  Member indicates that they do not want their POC shared with any EW providers.     Mailed POC signature page to member to sign and return.    Nisha Avila  Care Management Specialist   [facility]   [phone]

## 2021-01-14 NOTE — LETTER
January 14, 2021      DUSTIN FERRELL  [address]  [place] [address]      Dear Dustin:    At [facility], we are dedicated to improving your health and well-being. Enclosed is the Comprehensive Care Plan that we developed with you on 1/12/2021. Please review the Care Plan carefully.    As a reminder, some of the things we discussed at your visit include:    Your physical and mental health    Ways to reduce falls    Health care needs you may have    Don t forget to contact your care coordinator if you:    Have been hospitalized or plan to be hospitalized     Have had a fall     Have experienced a change in physical health    Are experiencing emotional problems     If you do not agree with your Care Plan, have questions about it, or have experienced a change in your needs, please call me at [phone]. If you are hearing impaired, please call the [place] [employer] at 410 or [phone] (tbgvgu-[id number] relay service).    Sincerely,    Merari Reyna RN, N    E-mail: [email or web address]  Phone: [phone]      [facility]) is a health plan that contracts with both Medicare and the [place] Medical Assistance (Medicaid) program to provide benefits of both programs to enrollees. Enrollment in [facility] depends on contract renewal.    [id number])     [id number] (11/18)

## 2021-01-14 NOTE — TELEPHONE ENCOUNTER
..[facility] now requests orders and shares plan of care/discharge summaries for some patients through [employer].  Please REPLY TO THIS MESSAGE OR ROUTE BACK TO THE AUTHOR in order to give authorization for orders when needed.  This is considered a verbal order, you will still receive a faxed copy of orders for signature.  Thank you for your assistance in improving collaboration for our patients.    ORDER  SN 1m2, 2prn    MD SUMMARY/PLAN OF CARE  SITUATION Met with patient for 60 day recertification of home care services. VS are WNL. Lungs are clear; denies SOB or cough. Denies headache or dizziness. Is alert and oriented. Denies any chest pains, no edema. Denies pain. Patient is ambulating with his walker and has not left his apartment due to limited to no transportation and the pandemic. He reports falling 2 times in the past month. One fall occured in his kitchen, when he was up cleaning from a big meal he had prepared. He said he had his walker but he lost his balance. The other fall he cannot recall the details. He said he called 911 both times and got help to get up. He obtained no injuries and did not go in for any assessment. He continues to drink alcohol daily. He did not state if alcohol was being used in these 2 situations. No s/sx of bleeding or clots. He has not restarted any blood thinners. Patient reports that he has changed how he does meal prep. He is simplifying meals and adding more prepared meals to his routine. He does not want to fall again in the kitchen. Writer educated him that he could use a chair at the table or his island stool to sit and do meal prep/clean up. He didn't like that idea and said he will just keep things simple. His appetite is appropriate for his activity level. He continues to drink at least 1 Ensure every day for nutrition and nutritions meals. Denies any problem with bowel or bladder. He is incontinent of urine and today there is a slight urine  odor. Aside from 2 recent falls, patient has no considerable changes in health or social status.     BACKGROUND 79 yo male who lives along in an apartment. Medical hx includes idiopathic cardiomyopathy, hypertension, AFib, ETOH with cirrhosis, repeated falls with injury. hx of subdural hematoma, HLD, GERD.    ANALYSIS Elderly male at risk for injury and complications r/t chronic cardiac disease and alcoholism. He is stable at the time, but remains at high risk r/t onging alcohol abuse and no caregiver.    RECOMMENDATION Continue skilled nurse vistis 1x/month for assessment and educations.   Thank you for this referral.    Leeanna Gleason RN  [phone]      Agree with the above orders and care plan.  Syed Santana MD

## 2021-02-22 NOTE — PROGRESS NOTES
TRANSITIONS OF CARE (LUCERO) LOG   LUCERO tasks should be completed by the CC within one (1) business day of notification of each transition. Follow up contact with member is required after return to their usual care setting.  Note:  If CC finds out about the transitions fifteen (15) days or more after the member has returned to their usual care setting, no LUCERO log is needed. However, the CC should check in with the member to discuss the transition process, any changes needed to the care plan and document it in a case note.    Member Name:  Carlton Bravo [facility] Name:  [facility] Member ID#: [id number]    Product: [facility] Care Coordinator Contact:  Merari Renya RN, PHN Agency/County/Care System: [facility]   Transition Communication Actions from Care Management Contact   Transition #1   Notification Date: 2/22/21 Transition Date:   2/20/21 Transition From: Home     Is this the member s usual care setting?               yes Transition To: Hospital, [facility]   Transition Type:  Unplanned  Reason for Admission/Comments:  2/22/21: Care Coordinator received notification that member admitted 2/20 for fall and subdural hematoma and nose fx.  Care Coordinator placed call to [facility] - spoke with EL Astudillo, at [phone].  She spoke with member this a.m. - he is declining to go to TCU but states that therapy has not yet seen member this morning.  Care Coordinator shared services with Wilda.  She will keep Care Coordinator up to date on discharge plans.      Shared CC contact info, care plan/services with receiving setting--Date completed: 2/22/21    Notified PCP of transition--Date completed:  2/22/21     via  EMR   Transition #2   Transition #3  (if applicable)   Notification Date:          Transition To:  Home  Transition Date: 2/23/21     Transition Type:    Planned  Notified PCP -- Date completed: 2/23/21              Shared CC contact info, care plan/services with receiving setting  or, if applicable, home care agency--Date completed:  2/23/21  *Complete additional tasks below, if this transition is a return to usual care setting.      Comments:      Notification Date:          Transition To:     Transition Date:              Transition Type:    Planned  Notified PCP--Date completed:          Shared CC contact info, care plan/services with receiving setting or, if applicable, home care agency--Date completed:       *Complete additional tasks below, if this transition is a return to usual care setting.      Comments:       *Complete tasks below when the member is discharging TO their usual care setting within one (1) business day of notification.  For situations where the Care Coordinator is notified of the discharge prior to the date of discharge, the Care Coordinator must follow up with the member or designated representative to confirm that discharge actually occurred and discuss required LUCERO tasks as outlined in the LUCERO Instructions.  (This includes situations where it may be a  new  usual care setting for the member. (i.e., a community member who decides upon permanent nursing home placement following hospitalization and rehab).    Date completed: 2/23/21  Communicated with member or their designated representative about the following:  care transition process; about changes to the member s health status; plan of care updates; education about transitions and how to prevent unplanned transitions/readmissions  Four Pillars for Optimal Transition:    Check  Yes  - if the member, family member and/or SNF/facility staff manages the following:    If  No  provide explanation in the comments section.          []  Yes     [x]  No     Does the member have a follow-up appointment scheduled with primary care or specialist? (Mental health hospitalizations--the appt. should be w/in 7 days)   [x]  Yes     []  No     Can the member manage their medications or is there a system in place to manage  medications (e.g. home care set-up)?         [x]  Yes     []  No     Can the member verbalize warning signs and symptoms to watch for and how to respond?         []  Yes     [x]  No     Does the member use a Personal Health Care Record?  Check  Yes  if visit summary, discharge summary, and/or healthcare summary are being used as a PHR.                                                                                                                                                                                    [x] Yes      [] No      Have you updated the member s care plan?  If  No  provide explanation in comments.   Comments:

## 2021-02-23 NOTE — PROGRESS NOTES
2/23/21: Care Coordinator had conversation with Reggie, friend/POA, regarding member.   He states he had long conversation with member yesterday - he knows member does not want TCU stay but both Reggie and Care Coordinator feel this is necessary for member.    Reggie states that if it is recommended by PT at hospital and not given an option that member will agree.   Also, discussed that member is having more difficult living on his own and looking at options of having staff come into home vs. AL.  Reggie feels member is ready for Assisted Living options.   Care Coordinator will compile list of AL's - he prefers to look towards him more - [place], [place], [facility]. Care Coordinator will reach out to NARCISO Wolfe, and have list for Reggie.     Merari Reyna RN, N  [facility]

## 2021-02-23 NOTE — PROGRESS NOTES
2/23/21: Care Coordinator received call back from EL Astudillo, she states she received Care Coordinator vm and had therapy re-evaluate member and they cleared him to discharge home with home care - RN, PT, OT.   He is also requesting to go home.  He will discharge later today.   Care Coordinator received email from Reggie also notifying Care Coordinator of discharge.  Care Coordinator replied back to Reggie letting him know Care Coordinator is aware and that Yaneth is looking into AL options and will send him list.  She is also aware that member is discharge home vs. TCU.     Merari Reyna RN, PHN  [facility]

## 2021-02-25 NOTE — PROGRESS NOTES
2/24/21: Care Coordinator placed call to member  - he states he is doing well at home and feeling well.  Discussed home care - he states that RN is coming out in next day to change dressing on elbow scrape.   He verified that he did not fracture his nose - they thought he did at first.   Care Coordinator asked about PT/OT - he states he doesn't feel it is necessary and states that he feels as though his strength is back to where it was before his falls. Care Coordinator informed member it was ordered through home care and RN will assess.    Care Coordinator and member discussed safety and falling.   Member states that he has no needs at this time.    Merari Reyna RN, N  [facility]

## 2021-02-26 NOTE — TELEPHONE ENCOUNTER
..[facility] now requests orders and shares plan of care/discharge summaries for some patients through [employer].  Please REPLY TO THIS MESSAGE OR ROUTE BACK TO THE AUTHOR in order to give authorization for orders when needed.  This is considered a verbal order, you will still receive a faxed copy of orders for signature.  Thank you for your assistance in improving collaboration for our patients.    ORDER  SN 2w1, 1w2, 3 prn   WOUND CARE to right elbow - wash with warm soapy water. Pat dry. Cover with tegaderm plus pad and change every other day and as needed if draining. Okay to use plain telfa or adaptic until tegaderm arrives. Nurse to teach patient how to perform. Okay to discontinue wound care when healed.    BALJIT  Fell at home on 2/20. He doesn't remember what he was doing or how exactly how he fell, but thinks he had his walker with him. He woke up with blood on his face from a nose fracture and blood on right elbow from a large skin tear.  He called a friend, who called 911 for him. Right elbow open area measures 0.5 x 4cm and is clean granulation tissue. Large bruise to the side of the laceration. Blood pooling under the tegaderm dressing. Writer cleaned and redressed the area. He received x rays in the hospital, only fracture is the nose. He has bruising on the bridge of his nose and bilaterally. Bruising on bilat shins. Very dry skin; instructed patient to apply lotion to strengthen skin. He refuses PT or OT, says that's not the problem, that won't help. When writer asked what do you think will help, he said he doesn't know. Will continue to discuss and educate on home safety.    Thank you,  Leeanna Gleason RN  [phone]    Above note reviewed. Agree with care plan and orders,  Syed Santana MD

## 2021-03-18 NOTE — TELEPHONE ENCOUNTER
..[facility] now requests orders and shares plan of care/discharge summaries for some patients through [employer].  Please REPLY TO THIS MESSAGE OR ROUTE BACK TO THE AUTHOR in order to give authorization for orders when needed.  This is considered a verbal order, you will still receive a faxed copy of orders for signature.  Thank you for your assistance in improving collaboration for our patients.    ORDER  SN 1M2, 2 PRN  FRANCIE 1M2    MD SUMMARY/PLAN OF CARE  SITUATION Met with patient for 60 day recertification of home care services. Patient has been seen more frequently in the past month r/t a fall on 2/20/21 that resulted in a subdural hemorrhage, nasal fracture, rigth elbow abrasion and multiple small bruises. Abrasion on right elbow has healed in the past week, with only a small scab remaining. All bruising has resolved. No face pain from nasal fracture. He denies pain overall. He has not had any additional falls since then. He uses his walker at home but continues to drink vodka in the evenings so he continues to be an increased falling risk. Patient has been staying home to avoid any exposure to covid19 and because he prefers to keep to himself. He does socialize with a couple groups of people online through email and other electronic methods. His strength has decreased somewhat related to his decreased activity level. Today he accepted assistance from home health aide visits to help him with changing his bed linens and minor personal cares such as applying lotion to dry feet/legs. Nurse to continue to encourage patient to accept further help as he gets older. His VS are WNL. Lungs are clear; no cough or congestion. Right foot has non pitting edema. Patient declines use of compression stockings but agrees to elevate legs at rest more often since the puffiness is happening earlier in the day than it used to. He is taking his medications as ordered and drinks 1-2 Ensure per day as well as a nutritious  meal. He enjoys cooking and uses a grocery delivery service.     BACKGROUND 81 yo male who  lives along in an apartment. Medical hx includes idiopathic cardiomyopathy, hypertension, AFib, ETOH with cirrhosis, repeated falls with injury. hx of subdural hemorrhage, HLD, GERD.    ANALYSIS Elderly male at risk for injury and complications r/t chronic cardiac disease and alcoholism. He remains at high risk r/t onging alcohol abuse and no caregiver and is increasingly isolated.    RECOMMENDATION Continue skilled nurse vistis 1x/month for assessment and education of chronic disease process and mental health. Add home health aide 1 visit per month for personal cares. Offered and encouraged SW visit to help find resources for additional in home care for homemaking, but patient says he does not think that is necessary.   Thank you for this referral.    Leeanna Gleason RN  [phone]    Agree with above orders and care plan.  Syed Santana MD

## 2021-05-14 NOTE — TELEPHONE ENCOUNTER
[facility] now requests orders and shares plan of care/discharge summaries for some patients through [email or web address].  Please REPLY TO THIS MESSAGE OR ROUTE BACK TO THE AUTHOR in order to give authorization for orders when needed.  This is considered a verbal order, you will still receive a faxed copy of orders for signature.  Thank you for your assistance in improving collaboration for our patients.    ORDER REQUEST  Skilled nursinx [id number] for 60days. 3 PRN visits     Home health aide: 1x [id number] for 60days.         RCT SBAR:  S- 60 day recertification of home care services.      B- 79 yo male who  lives alone in an apartment. Pt reports he never leaves his apartment and has essentials delivered to him weekly.  Medical hx includes idiopathic cardiomyopathy, hypertension , AFib, ETOH with cirrhosis, repeated falls with injury. hx of subdural hemorrhage, HLD, GERD.     A-  He denies pain overall. He has not had any additional falls in past 60days per his report. He uses his walker at home but continues to drink vodka in the evenings so he continues to be an increased falling risk. Patient has been staying home to avoid any exposure to covid19 and because he prefers to keep to himself. He does socialize with a couple groups of people online through email and other electronic methods. His strength has decreased somewhat related to his decreased activity level. Pt agreed to increase SN and HHA visits to [id number] for additional assistance. His VSS. Lungs are clear; no cough or congestion. Edema in BLE. Patient declines use of compression stockings but reports elevation during the day while sitting. He is taking his medications as ordered and drinks 1-2 Ensure per day as well as a nutritious meal. He enjoys cooking and uses a grocery delivery service. Pt denies any urinary issues other than frequency. Occasional constipation, edu on miralax, increased activity and increased hydration to help w/ this. He VU.   Elderly male at risk for injury and complications r/t chronic cardiac disease and alcoholism. He remains at high risk r/t onging alcohol abuse and no caregiver and is increasingly isolated,  risk for falls, risk for poor nutrition.      R- Continue skilled nurse vistis Q2wks for assessment and education of chronic disease process and mental health. HHA for ADL assistance.

## 2021-05-17 NOTE — TELEPHONE ENCOUNTER
Provider:  Please cosign verbal orders for continued home care. Thank you. Mami Bautista from [facility] is calling to follow up on this message sent on 5/14/2021.    Since this is continuation of care verbal orders were given to continue this care as requested below per protocol.  This message is being sent for cosign from provider.    This refill/encounter is being handled by a team outside your facility.  If action needs to be taken, please route the encounter back to your team that would normally handle it. Please do not send directly back to the sender. Thank you. Mami Ojeda R.N.

## 2021-05-17 NOTE — TELEPHONE ENCOUNTER
Please disregard this message. See message dated 5/14/2021 for further documentatiuon of this need.  Thank you. Mami Ojeda R.N.

## 2021-05-24 NOTE — PROGRESS NOTES
Care Coordinator received call from member stating that he is having issues with getting his Prevastatin filled - he has been frustrated as he has spoken with [facility] who he has always used and then they sent him to [facility] and he spoke with [facility].  Care Coordinator explained that per Epic it is being reviewed as lipid labs are due.  Care Coordinator will sent note to PCP.  Also, left  for [facility] RNRaisa at [phone] to see when she has next visit if labs are needed.     Merari Reyna, RN, PHN  [facility]

## 2021-05-25 NOTE — TELEPHONE ENCOUNTER
Pending future lab orders printed and have to [facility] [phone], with New Orders for Lab Draws on coversheet

## 2021-05-27 NOTE — PROGRESS NOTES
5/27/21: Care Coordinator spoke with [facility] Rn, Emelyn.  They have not received lab orders as of yet.   Care Coordinator provided Emelyn with labs that were ordered - she will f/u.   Emelyn or [facility] will help member schedule his virtual f/u with PCP per his wishes.  Care Coordinator spoke with member  - he is aware.     Merari Reyna RN, PHN  [facility]

## 2021-06-08 NOTE — PROGRESS NOTES
Carlton is a 81 year old who is being evaluated via a billable telephone visit.    What phone number would you like to be contacted at?   How would you like to obtain your AVS?     Assessment & Plan     1.  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  Discussed the recent lab which is consistent with worsening of liver disease.  Suspect he has cirrhosis.  2.  Longstanding persistent atrial fibrillation on beta-blocker and currently asymptomatic.  Not anticoagulated because of recurrent falls and previous subdural hematoma.  3.  Idiopathic cardiomyopathy.  Does not express any symptoms and signs of heart failure.  Currently on carvedilol.  4.  Poor balance and gait with previous multiple falls.  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  Hyperlipidemia been treated with pravastatin 80 mg a day.  Lipid profile on 6/2/2021 looking good he asked if it could be discontinued.  Informed him that he can certainly do so and have a lipid profile rechecked in couple months.  He is probably right that given his age and health problems he may not have a long-term benefit of taking pravastatin for primary prevention.  6.  Hypertension.  Blood pressure at home and checked by home health nurse have been good.  Currently on carvedilol.  7.  Alcoholism.  Continues to drink alcohol.  No desire to stop.  Will prescribe folic acid 1 mg daily.      Recommend recheck in 6 months and repeat lab.           No follow-ups on file.    Syed Santana MD  [facility]   Carlton is a 81 year old who presents for the following health issues     HPI           Review of Systems   Constitutional, HEENT, cardiovascular, pulmonary, GI, , musculoskeletal, neuro, skin, endocrine and psych systems are negative, except as otherwise noted.      Objective           Vitals:  No vitals were obtained today due to virtual visit.    Physical Exam    healthy, alert and no distress  PSYCH: Alert and oriented times 3; coherent speech, normal   rate and volume, able to articulate logical thoughts, able   to abstract reason, no tangential thoughts, no hallucinations   or delusions  His affect is normal  RESP: No cough, audible wheezing, able to talk in full sentences  Remainder of exam unable to be completed due to telephone visits    No results found for any visits on 06/08/21.   Lab on 06/02/2021 reviewed with patient            Phone call duration: 15 minutes

## 2021-07-21 NOTE — TELEPHONE ENCOUNTER
Provider:  Verbal orders given to Emelyn for continued services written below per protocol.  Please cosign these orders and close the chart. Thank you. Mami Dunaway needing verbal orders for continued services for Carlton:    1. Home health aide every other week for 9 weeks    2. Skilled nursing every other week for 9 weeks     This refill/encounter is being handled by a team outside your facility.  If action needs to be taken, please route the encounter back to your team that would normally handle it. Please do not send directly back to the sender. Thank you. Mami Ojeda R.N.

## 2021-07-27 NOTE — PROGRESS NOTES
[facility] Care Coordination Contact  CC received notification of Emergency Room visit.  ER visit occurred on 7/22/21 at [facility]  with Dx of weakness.    CC contacted member and reviewed discharge summary.  Member states he had a workup and was given fluids and is feeling much better.  He has realized his couch is too low and he has not been sitting there any longer.  He has been using his chair.    Member has a follow-up appointment with PCP: No: Offered Assistance with setting up a follow up appointment   [facility] RN is coming out tomorrow.   Member states he will schedule a phone/virutal visit with PCP next week.   Member has had a change in condition: No  New referrals placed: No  Home Visit Needed: No  Care plan reviewed and updated.  PCP notified of ED visit via EMR.      [facility] Six-Month Telephone Assessment    6 month telephone assessment completed on 7/27/21.    ER visits: Yes -  [facility]   Hospitalizations: No  TCU stays: No  Significant health status changes: N/A  Falls/Injuries: Yes:   ADL/IADL changes: No  Changes in services: No    Caregiver Assessment follow up:  N/A    Goals: See POC in chart for goal progress documentation.      Will see member in 6 months for an annual health risk assessment.   Encouraged member to call CC with any questions or concerns in the meantime.     Merari Reyna RN, PHN  [facility]

## 2021-08-06 NOTE — TELEPHONE ENCOUNTER
[facility] Call Center    Phone Message    May a detailed message be left on voicemail: yes     Reason for Call: The patient called [facility] stating he was talking to the nurse for his 2:00 pm appointment today, and then Dr. Santana tried calling in and he missed it. He has been trying to get through to them ever since. Writer called [facility] phone number, and reached Sabine at [facility] who tried connecting to care team through Teams with no response. The patient is very upset, and would like to speak with someone immediately. Please advise. Thank you.    Action Taken: Message routed to:  Other: [facility]    Travel Screening: Not Applicable

## 2021-08-06 NOTE — PROGRESS NOTES
Carlton is a 81 year old who is being evaluated via a billable telephone visit.      What phone number would you like to be contacted at? [phone]  How would you like to obtain your AVS? Mail a copy    Assessment & Plan     1.  Alcohol abuse  2.  Idiopathic cardiomyopathy.    Two attempts via phone 10 minutes apart went unanswered.  Unable to accomplish phone visit.    No follow-ups on file.    Syed Santana MD  [facility]   Carlton is a 81 year old who presents for the following health issues     HPI     ED/UC Followup:    Facility:  [facility]   Date of visit: 7/22/21  Reason for visit: Dizziness, Weakness  Current Status: Doing better     Patient was recently seen at [facility] with profound weakness and was found to have mild hypomagnesemia and dehydration.  He was given IV fluids and Nexium supplement.  He was discharged with plans for follow-up with me.  The phone visit was set up for his follow-up today.  The patient has history of alcohol abuse and cardiomyopathy.    I admit to phone calls 10 minutes apart and they went unanswered.  No further attempts were made.      Review of Systems   Not completed since unable to reach the patient.      Objective           Vitals:  No vitals were obtained today due to virtual visit.    Physical Exam     No physical exam since unable to reach the patient.    Phone call duration: 0 minutes

## 2021-08-10 NOTE — PROGRESS NOTES
TRANSITIONS OF CARE (LUCERO) LOG   LUCERO tasks should be completed by the CC within one (1) business day of notification of each transition. Follow up contact with member is required after return to their usual care setting.  Note:  If CC finds out about the transitions fifteen (15) days or more after the member has returned to their usual care setting, no LUCERO log is needed. However, the CC should check in with the member to discuss the transition process, any changes needed to the care plan and document it in a case note.    Member Name:  Carlton Bravo [facility]    Product: [facility] Care Coordinator Contact:  Merari Reyna RN, PHN Agency/County/Care System: [facility]   Transition Communication Actions from Care Management Contact   Transition #1   Notification Date: 8/10/21 Transition Date:   8/8/21 Transition From: Home     Is this the member s usual care setting?               yes Transition To: Hospital, [facility] Home   Transition Type:  Unplanned  Reason for Admission/Comments:  8/10/21: Care Coordinator received notification of admission to [facility] for GI issues and hypokalemia.  Care Coordinator spoke with friend/Reggie MCDANIELS. Care Coordinator placed call to [facility] SW dept and they stated member is being discharge tomorrow back home with resumption of [facility]. EL Alexis, is f/u member and they will give Care Coordinator contact info to her.      Shared CC contact info, care plan/services with receiving setting--Date completed: 8/10/21    Notified PCP of transition--Date completed:  8/10/21     via  EMR   Transition #2   Transition #3  (if applicable)   Notification Date: 8/11/21         Transition To:  Home  Transition Date: 8/11/21     Transition Type:    Planned  Notified PCP -- Date completed: 8/11/21              Shared CC contact info, care plan/services with receiving setting or, if applicable, home care agency--Date completed:  8/11/21  *Complete additional  tasks below, if this transition is a return to usual care setting.      Comments:      Notification Date:          Transition To:     Transition Date:              Transition Type:    Planned  Notified PCP--Date completed:          Shared CC contact info, care plan/services with receiving setting or, if applicable, home care agency--Date completed:       *Complete additional tasks below, if this transition is a return to usual care setting.      Comments:       *Complete tasks below when the member is discharging TO their usual care setting within one (1) business day of notification.  For situations where the Care Coordinator is notified of the discharge prior to the date of discharge, the Care Coordinator must follow up with the member or designated representative to confirm that discharge actually occurred and discuss required LUCERO tasks as outlined in the LUCERO Instructions.  (This includes situations where it may be a  new  usual care setting for the member. (i.e., a community member who decides upon permanent nursing home placement following hospitalization and rehab).    Date completed: 8/11/21  Communicated with member or their designated representative about the following:  care transition process; about changes to the member s health status; plan of care updates; education about transitions and how to prevent unplanned transitions/readmissions  Four Pillars for Optimal Transition:    Check  Yes  - if the member, family member and/or SNF/facility staff manages the following:    If  No  provide explanation in the comments section.          [x]  Yes     []  No     Does the member have a follow-up appointment scheduled with primary care or specialist? (Mental health hospitalizations--the appt. should be w/in 7 days)   [x]  Yes     []  No     Can the member manage their medications or is there a system in place to manage medications (e.g. home care set-up)?         [x]  Yes     []  No     Can the member verbalize  warning signs and symptoms to watch for and how to respond?         []  Yes     [x]  No     Does the member use a Personal Health Care Record?  Check  Yes  if visit summary, discharge summary, and/or healthcare summary are being used as a PHR.                                                                                                                                                                                    [x] Yes      [] No      Have you updated the member s care plan?  If  No  provide explanation in comments.   Comments:

## 2021-08-11 NOTE — PROGRESS NOTES
8/11/21: Care Coordinator had email from Reggie, son/POA, asking if member could receive covid 19 vaccine before being discharge. Member has refused in past but hoping member would now.   Care Coordinator placed call to layla mccain. Care Coordinator placed call to EL Alexis, she states that she will go to floor and talk with member and she does know they have J&J vaccine available.         Care Coordinator received call back from EL Alexis, stating that member agreed and received vaccine prior to discharge.   He was discharge at 11 a.m.     Care Coordinator received call back from member - he thanked Care Coordinator for assisting with vaccine.   He is now home and states feeling much better.   He has a call into homecare RN to schedule visit.    He states that he has no other needs.   Care Coordinator encourage member to schedule f/u with PCP.  He will talk to nursing re: this.     Care Coordinator notified Reggie of member's vaccine.     Merari Reyna RN, PHN  [facility]

## 2021-08-13 NOTE — PROGRESS NOTES
TRANSITIONS OF CARE (LUCERO) LOG   LUCERO tasks should be completed by the CC within one (1) business day of notification of each transition. Follow up contact with member is required after return to their usual care setting.  Note:  If CC finds out about the transitions fifteen (15) days or more after the member has returned to their usual care setting, no LUCERO log is needed. However, the CC should check in with the member to discuss the transition process, any changes needed to the care plan and document it in a case note.    Member Name:  [facility]    [place] [facility] [place] Contact:  Merari Reyna RN, N Agency/County/Care System: [facility]   Transition Communication Actions from Care Management Contact   Transition #1   Notification Date: 8/13/21 Transition Date:   8/12/21 Transition From: Home     Is this the member s usual care setting?               yes Transition To: [facility]   Transition Type:  Unplanned  Reason for Admission/Comments:  8/13/21: Care Coordinator received notification of admission on 8/12 d/t fall.  Care Coordinator sent message to friend/Reggie MCDANIELS.   Care Coordinator also left vm for SW at [facility] for call back.      Shared CC contact info, care plan/services with receiving setting--Date completed: 8/13/21    Notified PCP of transition--Date completed:  8/13/21     via  EMR   Transition #2   Transition #3  (if applicable)   Notification Date: 8/16/21         Transition To:  Skilled Nursing Facility, [facility]  Transition Date: 8/16/21     Transition Type:    Planned  Notified PCP -- Date completed: 8/16/21              Shared CC contact info, care plan/services with receiving setting or, if applicable, home care agency--Date completed:  8/16/21  *Complete additional tasks below, if this transition is a return to usual care setting.      Comments:      Notification Date:  10/4/21         Transition To:  [facility]  Transition Date:   10/4/21           Transition Type:    Unplanned  Notified PCP--Date completed: 10/4/21         Shared CC contact info, care plan/services with receiving setting or, if applicable, home care agency--Date completed: 10/4/21      *Complete additional tasks below, if this transition is a return to usual care setting.      Comments:  See above notes in epic.      *Complete tasks below when the member is discharging TO their usual care setting within one (1) business day of notification.  For situations where the Care Coordinator is notified of the discharge prior to the date of discharge, the Care Coordinator must follow up with the member or designated representative to confirm that discharge actually occurred and discuss required LUCERO tasks as outlined in the LUCERO Instructions.  (This includes situations where it may be a  new  usual care setting for the member. (i.e., a community member who decides upon permanent nursing home placement following hospitalization and rehab).    Date completed:   Communicated with member or their designated representative about the following:  care transition process; about changes to the member s health status; plan of care updates; education about transitions and how to prevent unplanned transitions/readmissions  Four Pillars for Optimal Transition:    Check  Yes  - if the member, family member and/or SNF/facility staff manages the following:    If  No  provide explanation in the comments section.          []  Yes     []  No     Does the member have a follow-up appointment scheduled with primary care or specialist? (Mental health hospitalizations--the appt. should be w/in 7 days)   []  Yes     []  No     Can the member manage their medications or is there a system in place to manage medications (e.g. home care set-up)?         []  Yes     []  No     Can the member verbalize warning signs and symptoms to watch for and how to  respond?         []  Yes     []  No     Does the member use a Personal Health Care Record?  Check  Yes  if visit summary, discharge summary, and/or healthcare summary are being used as a PHR.                                                                                                                                                                                    [] Yes      [] No      Have you updated the member s care plan?  If  No  provide explanation in comments.   Comments:

## 2021-08-13 NOTE — PROGRESS NOTES
8/13/21: Care Coordinator received call back from EL Najera, at [facility].  Discussed discharge plans and Care Coordinator concern with being discharge home vs TCU d/t to second hospitalization this week. She agreed and states that she had discussed TCU with member - he was hesitant but states he would be willing to try TCU at [facility] - she has referral in.  He did not want her to pursue other TCU at this time.      Care Coordinator also sent message to Reggie, friend.     Care Coordinator received call from Reggie - he is in agreement that member needs to go to TCU.  He is currently on vacation but asked Care Coordinator to continue keeping him up to date.     Merari Reyna RN, PHN  [facility]

## 2021-08-16 NOTE — PROGRESS NOTES
8/16/21: Care Coordinator received call from EL Najera, stating that member is being transferred to [facility] today.       Care Coordinator notified Juan F Paredes.     Care Coordinator placed call to [facility] at [phone].  Left vm for Claudia GALLEGOS.     Merari Reyna RN, N  [facility]

## 2021-08-18 NOTE — PROGRESS NOTES
8/18/21: Care Coordinator received call from member - he wanted to update Care Coordinator and let Care Coordinator know that he will be at TCU for at least another 3 weeks.   He states that he is doing well and understands this is where he needs to be at this time.     Merari Reyna RN, PHN  [facility]

## 2021-08-19 NOTE — PROGRESS NOTES
8/19/21: Care Coordinator spoke with EL Salas, at [facility] at [phone].  She states they have tentative discharge date of 9/3 but that is to be re-evaluated.   Care Coordinator and Maritza will f/u week of 8/30.     Merari Reyna RN, N  [facility]

## 2021-08-20 NOTE — TELEPHONE ENCOUNTER
Form faxed to [phone] and placed in scan bin to be scanned into chart.      Wendy PRADO)(ABRAM)

## 2021-08-20 NOTE — TELEPHONE ENCOUNTER
Form faxed to [phone] and placed in scan bin to be scanned into chart.      Wendy PRADO)(ABRAM)

## 2021-08-30 NOTE — PROGRESS NOTES
8/30/21: Care Coordinator received call from EL Salas, at [facility] stating that member was evaluated and determined unsafe to be discharge home.  She states that she spoke iwht member and he is in agreement to be discharge to AL.   Care Coordinator explained that Reggie, friend/POA had a specific place in mind and will have Reggie f/u with Maritza.   Care Coordinator left vm for Reggie with Maritza information.     Care Coordinator spoke with Reggie - he states that the AL has an opening right now and he will call Maritza as well as speak with member about this.     Care Coordinator received call from member stating that he had more testing done today and that it may be ok for him to go back home and that is where he would prefer.  Care Coordinator and member discussed and he agree that an AL may be beneficial as well - he states that he told EL Salas, that it would be a discussion between Maritza, guillermina Care Coordinator, member and Reggie.  Care Coordinator did leave vm for Reggie to inform him of conversation.     Merari Reyna RN, PHN  [facility]

## 2021-08-31 NOTE — PROGRESS NOTES
8/31/21: Care Coordinator received vm from Reggie and from EL Salas.  Reggie is going to visit with member tomorrow and discussing AL's more in depth.  He will let Care Coordinator know more information after the visit.      Merari Reyna RN, N  [facility]

## 2021-09-01 NOTE — PROGRESS NOTES
9/1/21: Care Coordinator spoke with syed Paredes/ENEDELIA.  He met with member at [facility] - discussed AL - member will plan to be discharge to [facility].   Reggie thought it won't be for a few weeks as they need to figure out moving, furniture, etc.     Per Reggie he spoke with EL Salas, at TCU and updated her.     Care Coordinator left message at [facility] with Care Coordinator information.     Merari Reyna RN, PHN  [facility]

## 2021-09-02 NOTE — PROGRESS NOTES
9/2/21: Care Coordinator received call from Reggie, friend/POA that Carlton left him a vm that he was being discharge today to home.   Reggie assumes this isn't true but wondering if Care Coordinator had difffernt info.   Care Coordinator lvm for EL Salas, at [facility].     Care Coordinator spoke with EL Salas - they had talked with member that therapy was complete and that they would be changing over his insurance to [place] now until he discharge [facility].   He was confused with all of the information.  Maritza did speak with member again and explained.  She also relayed this to Reggie.      Merari Reyna RN, N  [facility]

## 2021-09-07 NOTE — PROGRESS NOTES
9/3/21:  Care Coordinator spoke with EL Salas, she had spoken with member and he was upset about moving to AL.  He said that he will stop eating  - she had a long talk with him as well as Nutrition and member did eat that day etc.   Maritza will discuss with Reggie as well but wanted Care Coordinator to be aware.     Merari Reyna RN, N  [facility]

## 2021-09-07 NOTE — PROGRESS NOTES
9/7/21: Care Coordinator spoke with Reggie.  He spoke with member several times this past weekend and member is understanding that he needs to move to AL and needs to move to a place where there are people to assist him.   Reggie has been in contact with Jam at [facility].  They are scheduling nursing assessment.     Merari Reyna RN, N  [facility]

## 2021-09-10 NOTE — PROGRESS NOTES
9/10/21: Care Coordinator, member, Reggie - friend/PONEGRITA, and Heike GALLEGOS at [facility] had conference call via phone regarding transition to AL.  Member states that mixed feelings about AL and have some concerns - the care conference was to help member process some of the concerns.    Member states learning curve and moving is a big change for him.  EL Salas, asked if going to see the AL in person would be easier on member - he declines to go see the new AL in person before he moves.     Member talked about equip needs - bed rail, lift chair, etc.   Reggie clarified and states that these are items member is wanting to have at his home/condo, not meaning for AL.     Discusssed pros/cons and safety.   Member understands but doesn't necessarily like plan.   Jose Juan will continue to work with Jam at AL to have a plan of move in dates.     Merari Reyna RN, N  [facility]

## 2021-09-19 NOTE — PROGRESS NOTES
[facility] Care Coordination Contact    Received a request to submit a DTR for the terminated of EW. Documentation completed and faxed to the health plan. Care Coordinator aware.    Mariela Russell RN  Utilization   [facility]  [phone]

## 2021-09-24 NOTE — PROGRESS NOTES
9/24/21: Several emails between EL Taylor, at TCU; Reggie, PONEGRITA/Friend, and staff at [facility].   Per Rosmery Morris there are delays in admissions and now a wait list - admissions starting again in November and therefore nursing assessment planned in Oct.      Reggie would like to start looking at other options however did call a few places and understands there are long wait lists at some and therefore waiting until Nov may be right thing.  A kitchen unit is a must and therefore understands may be difficult to find.   Care Coordinator did reach out to NORMA sifuentes, to review with Reggie the previous list they reviewed and see if other options.     Merari Reyna RN, PHN  [facility]

## 2021-10-01 NOTE — PROGRESS NOTES
10/1/21: Care Coordinator received email from Reggie that he has toured and met with [facility] in [place] and they have EW opening - he shared pictures with member and is agreeable to move there.    Reggie will provide Care Coordinator with contact information to start process.     Care Coordinator received contact info for [facility]:   Dilip Walton    D [phone]  C [phone] (Text is okay)  F [phone]  [email or web address]      Care Coordinator emailed Dilip requesting to let Care Coordinator on estimated move in so that Care Coordinator can prepare for assessment.     Care Coordinator also spoke with Reggie, member would like hospital bed as well as shower chair.  Care Coordinator placed call to [facility] - started process.  EL Duncan, at [facility] will obtain rx.     Merari Reyna RN, PHN  [facility]

## 2021-10-04 NOTE — PROGRESS NOTES
10/4/21: Care Coordinator received email from Reggie to cancel bed - they got him a full size bed.   They would like bed rail when discharge.      Care Coordinator placed call to [facility] - spoke with Te - bed canceled.   Shower bench w/ back and bed rail are on order but will need to be updated once know discharge date.     Merari Reyna RN, N  [facility]

## 2021-10-04 NOTE — PROGRESS NOTES
10/4/21: Care Coordinator received email from EL Duncan, at [facility] stating that member was sent to hospital by nursing staff for a change in condition.  Reggie was included on email.  There is no update in Epic - Care Coordinator sent email to Perla for update.     Merari Reyna RN, PHN  [facility]

## 2021-10-06 NOTE — PROGRESS NOTES
10/5/21: Care Coordinator placed [facility] to [facility] - left voice message for SW dept as no admission in Epic.     Merari Reyna RN, N  [facility]

## 2021-10-06 NOTE — PROGRESS NOTES
10/6/21: Care Coordinator spoke with KACI Muller at [facility] - member is in ICU. Renzo states that they are unsure of dx but was to have an mrcp then ercp however did not complete.  Member needed to be intubated last night and is septic and fluid overload.     Care Coordinator updated Reggie, friend/POA.    Care Coordinator has voice message into .    Care Coordinator received call back from , Diann Kenny.   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    She has Reggie contact info but they do not have POA/HCD paperwork.   Care Coordinator will have Reggie send to her.     Merari Reyna RN, N  [facility]

## 2021-10-08 NOTE — PROGRESS NOTES
10/8/21: Care Coordinator spoke with Reggie, he spoke with  yesterday and member is not doing well and has been worsening everyday and cannot identify source of infection.   Reggie and his wife are meeting with Nae this a.m. to discuss plan.   He will keep Care Coordinator up to date.     Reggie also requested to remove bed hold at [facility] - Care Coordinator sent email to EL Duncan, at [facility].     Merari Reyna RN, N  [facility]

## 2021-10-12 NOTE — PROGRESS NOTES
10/11/21: Care Coordinator spoke with Reggie - life support was removed on Saturday - member was moved to comfort care floor.  Reggie was just to see member.      Merair Reyna RN, N  [facility]

## 2021-10-12 NOTE — PROGRESS NOTES
10/12/21: Care Coordinator received message from Reggie that member passed away this a.m. at 4:05 a.m.       Care Coordinator to complete [facility] Death Notification and will send [phone] to [facility].     Disenrollment checklist completed.     Merari Reyna RN, N  [facility]

## 2021-10-12 NOTE — PROGRESS NOTES
[facility] Care Coordination Contact    Notified by syed Paredes/ENEDELIA that member passed away on 10/12/21 at Hospital.    PCP notified.     Death Notification form completed and faxed to [facility].    Chart reviewed and this CC's encounter closed. Chart handed off to CMS to process disenrollment tasks.    Merari Reyna RN, PHN  [facility]

## 2021-10-18 ENCOUNTER — TELEPHONE (OUTPATIENT)
Dept: FAMILY MEDICINE | Facility: CLINIC | Age: 81
End: 2021-10-18

## 2022-02-17 PROBLEM — Z76.89 HEALTH CARE HOME: Status: RESOLVED | Noted: 2019-05-01 | Resolved: 2021-01-01

## 2023-08-14 NOTE — TELEPHONE ENCOUNTER
Addressed in ACC note.  Wilda Metcalf RN on 8/1/2019 at 7:27 AM    
INR result is 2.8.  No changes to meds or diet - consistent alcohol use.    Need dosing instructions for TONIGHT.  Please call patient directly for dosing instructions if Nurse is not available when you call nurse. Can also leave a message on nurses number, but still also call patient w it.  
Sandra CLEMONS called.    There was a scheduling error, the RN was not able to go to the patient's home today to check INR.  Will go to his home tomorrow.    Need dosing instructions for today.    Instructed to have patient take 2 mg today and recheck tomorrow.     Nisha Blanco RN, [facility]    
Opt out

## (undated) DEVICE — EYE PACK CUSTOM CATARACT AS12127-01

## (undated) DEVICE — GLOVE PROTEXIS W/NEU-THERA 8.0  2D73TE80

## (undated) DEVICE — SOL WATER IRRIG 1000ML BOTTLE 07139-09

## (undated) DEVICE — PREP CHLORAPREP 26ML TINTED ORANGE  260815

## (undated) RX ORDER — ACETAMINOPHEN 325 MG/1
TABLET ORAL
Status: DISPENSED
Start: 2019-02-14

## (undated) RX ORDER — ACETAMINOPHEN 325 MG/1
TABLET ORAL
Status: DISPENSED
Start: 2019-02-28

## (undated) RX ORDER — IBUPROFEN 400 MG/1
TABLET, FILM COATED ORAL
Status: DISPENSED
Start: 2019-02-14

## (undated) RX ORDER — FENTANYL CITRATE 50 UG/ML
INJECTION, SOLUTION INTRAMUSCULAR; INTRAVENOUS
Status: DISPENSED
Start: 2019-02-14